# Patient Record
Sex: FEMALE | Race: WHITE | Employment: OTHER | URBAN - METROPOLITAN AREA
[De-identification: names, ages, dates, MRNs, and addresses within clinical notes are randomized per-mention and may not be internally consistent; named-entity substitution may affect disease eponyms.]

---

## 2017-02-01 ENCOUNTER — ANESTHESIA EVENT (OUTPATIENT)
Dept: ENDOSCOPY | Age: 67
End: 2017-02-01
Payer: MEDICARE

## 2017-02-01 ENCOUNTER — ANESTHESIA (OUTPATIENT)
Dept: ENDOSCOPY | Age: 67
End: 2017-02-01
Payer: MEDICARE

## 2017-02-01 ENCOUNTER — HOSPITAL ENCOUNTER (OUTPATIENT)
Age: 67
Setting detail: OUTPATIENT SURGERY
Discharge: HOME | End: 2017-02-01
Attending: INTERNAL MEDICINE | Admitting: INTERNAL MEDICINE
Payer: MEDICARE

## 2017-02-01 VITALS
TEMPERATURE: 97.6 F | HEART RATE: 51 BPM | OXYGEN SATURATION: 100 % | DIASTOLIC BLOOD PRESSURE: 76 MMHG | RESPIRATION RATE: 14 BRPM | SYSTOLIC BLOOD PRESSURE: 148 MMHG

## 2017-02-01 PROCEDURE — 77030027957 HC TBNG IRR ENDOGTR BUSS -B: Performed by: INTERNAL MEDICINE

## 2017-02-01 PROCEDURE — 74011250636 HC RX REV CODE- 250/636

## 2017-02-01 PROCEDURE — 76060000031 HC ANESTHESIA FIRST 0.5 HR: Performed by: INTERNAL MEDICINE

## 2017-02-01 PROCEDURE — 74011000250 HC RX REV CODE- 250

## 2017-02-01 PROCEDURE — 76040000019: Performed by: INTERNAL MEDICINE

## 2017-02-01 RX ORDER — ATROPINE SULFATE 0.1 MG/ML
0.5 INJECTION INTRAVENOUS
Status: DISCONTINUED | OUTPATIENT
Start: 2017-02-01 | End: 2017-02-01 | Stop reason: HOSPADM

## 2017-02-01 RX ORDER — LIDOCAINE HYDROCHLORIDE 20 MG/ML
INJECTION, SOLUTION EPIDURAL; INFILTRATION; INTRACAUDAL; PERINEURAL AS NEEDED
Status: DISCONTINUED | OUTPATIENT
Start: 2017-02-01 | End: 2017-02-01 | Stop reason: HOSPADM

## 2017-02-01 RX ORDER — ZOLEDRONIC ACID 5 MG/100ML
5 INJECTION, SOLUTION INTRAVENOUS ONCE
COMMUNITY

## 2017-02-01 RX ORDER — EPINEPHRINE 0.1 MG/ML
1 INJECTION INTRACARDIAC; INTRAVENOUS
Status: DISCONTINUED | OUTPATIENT
Start: 2017-02-01 | End: 2017-02-01 | Stop reason: HOSPADM

## 2017-02-01 RX ORDER — MIDAZOLAM HYDROCHLORIDE 1 MG/ML
.25-5 INJECTION, SOLUTION INTRAMUSCULAR; INTRAVENOUS
Status: DISCONTINUED | OUTPATIENT
Start: 2017-02-01 | End: 2017-02-01 | Stop reason: HOSPADM

## 2017-02-01 RX ORDER — SODIUM CHLORIDE 0.9 % (FLUSH) 0.9 %
5-10 SYRINGE (ML) INJECTION EVERY 8 HOURS
Status: DISCONTINUED | OUTPATIENT
Start: 2017-02-01 | End: 2017-02-01 | Stop reason: HOSPADM

## 2017-02-01 RX ORDER — VALACYCLOVIR HYDROCHLORIDE 500 MG/1
TABLET, FILM COATED ORAL 2 TIMES DAILY
COMMUNITY

## 2017-02-01 RX ORDER — NALOXONE HYDROCHLORIDE 0.4 MG/ML
0.4 INJECTION, SOLUTION INTRAMUSCULAR; INTRAVENOUS; SUBCUTANEOUS
Status: DISCONTINUED | OUTPATIENT
Start: 2017-02-01 | End: 2017-02-01 | Stop reason: HOSPADM

## 2017-02-01 RX ORDER — SODIUM CHLORIDE 9 MG/ML
50 INJECTION, SOLUTION INTRAVENOUS CONTINUOUS
Status: DISCONTINUED | OUTPATIENT
Start: 2017-02-01 | End: 2017-02-01 | Stop reason: HOSPADM

## 2017-02-01 RX ORDER — PROPOFOL 10 MG/ML
INJECTION, EMULSION INTRAVENOUS AS NEEDED
Status: DISCONTINUED | OUTPATIENT
Start: 2017-02-01 | End: 2017-02-01 | Stop reason: HOSPADM

## 2017-02-01 RX ORDER — FENTANYL CITRATE 50 UG/ML
100 INJECTION, SOLUTION INTRAMUSCULAR; INTRAVENOUS
Status: DISCONTINUED | OUTPATIENT
Start: 2017-02-01 | End: 2017-02-01 | Stop reason: HOSPADM

## 2017-02-01 RX ORDER — SODIUM CHLORIDE 0.9 % (FLUSH) 0.9 %
5-10 SYRINGE (ML) INJECTION AS NEEDED
Status: DISCONTINUED | OUTPATIENT
Start: 2017-02-01 | End: 2017-02-01 | Stop reason: HOSPADM

## 2017-02-01 RX ORDER — DEXTROMETHORPHAN/PSEUDOEPHED 2.5-7.5/.8
1.2 DROPS ORAL
Status: DISCONTINUED | OUTPATIENT
Start: 2017-02-01 | End: 2017-02-01 | Stop reason: HOSPADM

## 2017-02-01 RX ORDER — FLUMAZENIL 0.1 MG/ML
0.2 INJECTION INTRAVENOUS
Status: DISCONTINUED | OUTPATIENT
Start: 2017-02-01 | End: 2017-02-01 | Stop reason: HOSPADM

## 2017-02-01 RX ADMIN — PROPOFOL 20 MG: 10 INJECTION, EMULSION INTRAVENOUS at 09:25

## 2017-02-01 RX ADMIN — PROPOFOL 50 MG: 10 INJECTION, EMULSION INTRAVENOUS at 09:30

## 2017-02-01 RX ADMIN — PROPOFOL 50 MG: 10 INJECTION, EMULSION INTRAVENOUS at 09:41

## 2017-02-01 RX ADMIN — PROPOFOL 50 MG: 10 INJECTION, EMULSION INTRAVENOUS at 09:33

## 2017-02-01 RX ADMIN — PROPOFOL 100 MG: 10 INJECTION, EMULSION INTRAVENOUS at 09:26

## 2017-02-01 RX ADMIN — PROPOFOL 50 MG: 10 INJECTION, EMULSION INTRAVENOUS at 09:38

## 2017-02-01 RX ADMIN — LIDOCAINE HYDROCHLORIDE 20 MG: 20 INJECTION, SOLUTION EPIDURAL; INFILTRATION; INTRACAUDAL; PERINEURAL at 09:25

## 2017-02-01 NOTE — ANESTHESIA PREPROCEDURE EVALUATION
Anesthetic History   No history of anesthetic complications            Review of Systems / Medical History  Patient summary reviewed, nursing notes reviewed and pertinent labs reviewed    Pulmonary  Within defined limits                 Neuro/Psych   Within defined limits           Cardiovascular  Within defined limits                     GI/Hepatic/Renal  Within defined limits         Liver disease     Endo/Other  Within defined limits           Other Findings              Physical Exam    Airway  Mallampati: II  TM Distance: > 6 cm  Neck ROM: normal range of motion   Mouth opening: Normal     Cardiovascular  Regular rate and rhythm,  S1 and S2 normal,  no murmur, click, rub, or gallop             Dental  No notable dental hx       Pulmonary  Breath sounds clear to auscultation               Abdominal  GI exam deferred       Other Findings            Anesthetic Plan    ASA: 3  Anesthesia type: MAC          Induction: Intravenous  Anesthetic plan and risks discussed with: Patient

## 2017-02-01 NOTE — PROCEDURES
[address]  [address] [facility] [address] [facility]               : Juliet Valencia MD    Referring Provider: Dorota Kyle MD    Sedation:  MAC anesthesia Propofol      Prior to the procedure its objectives, risks, consequences and alternatives were discussed with the patient who then elected to proceed. The patient had the opportunity to ask questions and those questions were answered. A physical exam was performed. The heart, lungs, and mental status were examined prior to the procedure and found to be satisfactory for conscious sedation and for the procedure. Conscious sedation was initiated by the physician. Continuous pulse oximetry and blood pressure monitoring were used throughout the procedure. After appropriate pharyngeal anesthesia, the endoscope was passed into the esophagus without difficulty. The proximal esophagus is normal as is the distal esophagus. There are no distal varices, just scarring. The scope was passed into the stomach without difficulty. The fundus, body, antrum, pylorus, bulb and postbulbar area are unremarkable. On slow withdrawal of the scope, no abnormality was noted. Retroflexion revealed normal cardia and fundus. She tolerated the procedure without complications and will be discharged later today. Specimen None    Complications: None. EBL:  None.     Juliet Valencia MD  2/1/2017  9:52 AM

## 2017-02-01 NOTE — ROUTINE PROCESS
Franky Bruner  [birth year]  [id number]    Situation:  Verbal report received from: Max Chauhan RN  Procedure: Procedure(s):  ESOPHAGOGASTRODUODENOSCOPY (EGD)  COLONOSCOPY    Background:    Preoperative diagnosis: SCREENING RECALL, HX ESOPHAEAL VARICES  Postoperative diagnosis: 1. Normal EGD  2.  Normal Colonoscopy    :  Dr. Jennifer Bonner  Assistant(s): Endoscopy Technician-1: Noel Wallace  Endoscopy RN-1: Keven Mayfield RN    Specimens: * No specimens in log *  H. Pylori  no    Assessment:  Intra-procedure medications         Anesthesia gave intra-procedure sedation and medications, see anesthesia flow sheet yes    Intravenous fluids: NS@ KVO     Vital signs stable     Abdominal assessment: round and soft   Recommendation:  Discharge patient per MD order  Family or Friend   Permission to share finding with family or friend yes

## 2017-02-01 NOTE — ANESTHESIA POSTPROCEDURE EVALUATION
Post-Anesthesia Evaluation and Assessment    Patient: Lita Wray MRN: [id number]  SSN: [id number]    YOB: 1950  Age: 74 y.o. Sex: female       Cardiovascular Function/Vital Signs  Visit Vitals    /55    Pulse 68    Resp 15    SpO2 99%       Patient is status post MAC anesthesia for Procedure(s):  ESOPHAGOGASTRODUODENOSCOPY (EGD)  COLONOSCOPY. Nausea/Vomiting: None    Postoperative hydration reviewed and adequate. Pain:  Pain Scale 1: Numeric (0 - 10) (02/01/17 0913)  Pain Intensity 1: 0 (02/01/17 0913)   Managed    Neurological Status: At baseline    Mental Status and Level of Consciousness: Arousable    Pulmonary Status:   O2 Device: Nasal cannula (02/01/17 0944)   Adequate oxygenation and airway patent    Complications related to anesthesia: None    Post-anesthesia assessment completed.  No concerns    Signed By: Kalyani Pelaez MD     February 1, 2017

## 2017-02-01 NOTE — H&P
[address]  [address], [address] [facility] Regis is a  74 y.o.  female who presents with a history of esophageal varices secondary to autoimmune hepatitis. She is also having a screening colonoscopy. .        Past Medical History   Diagnosis Date    [facility] CA ([facility] [address]     Cancer ([facility] [address]      breast cancer    Coagulation defects 2001     TTP    Esophageal varices (HCC)     Liver disease      autoimmune liver disease    Portal hypertension (HCC)      Past Surgical History   Procedure Laterality Date    Hx orthopaedic       knee replacement    Pr breast surgery procedure unlisted       lumpectomy    Hx other surgical       partial thyroidectomy 1999    Hx gyn       tubaligation    Hx gi       banding varicies     Allergies   Allergen Reactions    Aspirin Anaphylaxis    Mushroom Hives         Visit Vitals    Pulse (!) 57    Resp 18    SpO2 97%           PHYSICAL EXAM:  General: WD, WN. Alert, cooperative, no acute distress    HEENT: NC, Atraumatic. PERRLA, EOMI. Anicteric sclerae. Mallampati score 2  Lungs:  CTA Bilaterally. No Wheezing/Rhonchi/Rales. Heart:  Regular  rhythm,  No murmur (), No Rubs, No Gallops  Abdomen: Soft, Non distended, Non tender.  +Bowel sounds, no HSM  Extremities: No c/c/e  Neurologic:  CN 2-12 gi, Alert and oriented X 3. No acute neurological distress   Psych:   Good insight. Not anxious nor agitated. Plan:   Endoscopic procedure with MAC.     Narayan Power MD  2/1/2017  9:24 AM

## 2017-02-01 NOTE — PROCEDURES
[address]  [facility] [address], [address] [facility]              :  Kerri Duran MD    Referring Provider: Cande Roy MD    Sedation:  MAC anesthesia Propofol        Prior to the procedure its objectives, risks, consequences and alternatives were discussed with the patient who then elected to proceed. The patient had the opportunity to ask questions and those questions were answered. A physical exam was performed. The heart, lungs, and mental status were examined prior to the procedure and found to be satisfactory for conscious sedation and for the procedure. Conscious sedation was initiated by the physician. Continuous pulse oximetry and blood pressure monitoring were used throughout the procedure. After appropriate analgesia and rectal exam, the colonoscope was passed into the anus and passed to the cecum without difficulty. The prep was good. On slow withdrawal of the scope, the cecum, ascending colon, hepatic flexure, transverse colon, splenic flexure, descending colon, sigmoid and rectum were normal. Retroflexion exam of the rectum was normal. She tolerated the procedure without complication and I recommend a repeat colonoscopy in 10 years. Specimen none    Complications: None. EBL:  None.     Kerri Duran MD  2/1/2017  9:53 AM

## 2017-02-01 NOTE — ANESTHESIA POSTPROCEDURE EVALUATION
Post-Anesthesia Evaluation and Assessment    Patient: Iman Mora MRN: [id number]  SSN: [id number]    YOB: 1950  Age: 74 y.o. Sex: female       Cardiovascular Function/Vital Signs  Visit Vitals    /76    Pulse (!) 51    Temp 36.4 °C (97.6 °F)    Resp 14    SpO2 100%       Patient is status post MAC anesthesia for Procedure(s):  ESOPHAGOGASTRODUODENOSCOPY (EGD)  COLONOSCOPY. Nausea/Vomiting: None    Postoperative hydration reviewed and adequate. Pain:  Pain Scale 1: Numeric (0 - 10) (02/01/17 1015)  Pain Intensity 1: 0 (02/01/17 1015)   Managed    Neurological Status: At baseline    Mental Status and Level of Consciousness: Arousable    Pulmonary Status:   O2 Device: Room air (02/01/17 1015)   Adequate oxygenation and airway patent    Complications related to anesthesia: None    Post-anesthesia assessment completed.  No concerns    Signed By: Hammad Reece MD     February 1, 2017

## 2017-02-01 NOTE — IP AVS SNAPSHOT
[address] [facility] [address] [facility] 
[phone] Patient: Carlos Ayala MRN: [id number] Rondel Knife You are allergic to the following Allergen Reactions Aspirin Anaphylaxis Mushroom Hives Recent Documentation OB Status Smoking Status Postmenopausal Never Smoker Emergency Contacts Name Discharge Info Relation Home Work Mobile Ramy Vela  Spouse [3] [phone] About your hospitalization You were admitted on:  February 1, 2017 You last received care in the:  [facility] ENDOSCOPY You were discharged on:  February 1, 2017 Unit phone number:  [phone] Why you were hospitalized Your primary diagnosis was:  Not on File Providers Seen During Your Hospitalizations Provider Role Specialty Primary office phone Malgorzata Montalvo MD Attending Provider Gastroenterology [phone] Your Primary Care Physician (PCP) Primary Care Physician Office Phone Office Fax Shorty Reederle [phone] Follow-up Information None Current Discharge Medication List  
  
CONTINUE these medications which have NOT CHANGED Dose & Instructions Dispensing Information Comments Morning Noon Evening Bedtime CALCIUM 600 + D(3) PO Your next dose is: Today, Tomorrow Other:  _________ Take  by mouth. Refills:  0  
     
   
   
   
  
 FISH OIL 1,000 mg Cap Generic drug:  omega-3 fatty acids-vitamin e Your next dose is: Today, Tomorrow Other:  _________ Dose:  1 Cap Take 1 Cap by mouth. Refills:  0 MULTIGEN tablet Generic drug:  Iron [id number]-C-Y85-[id number]-Suc-Stoma Your next dose is: Today, Tomorrow Other:  _________ Dose:  1 Tab Take 1 Tab by mouth daily. Refills:  0 multivitamin tablet Commonly known as:  ONE A DAY Your next dose is: Today, Tomorrow Other:  _________ Dose:  1 Tab Take 1 tablet by mouth daily. Refills:  0  
     
   
   
   
  
 nadolol 20 mg tablet Commonly known as:  CORGARD Your next dose is: Today, Tomorrow Other:  _________ Take  by mouth daily. Refills:  0  
     
   
   
   
  
 predniSONE 1 mg tablet Commonly known as:  Lindsay Roers Your next dose is: Today, Tomorrow Other:  _________ Dose:  5 mg Take 5 mg by mouth daily. Refills:  0 RECLAST 5 mg/100 mL Soln Generic drug:  zoledronic acid Your next dose is: Today, Tomorrow Other:  _________ Dose:  5 mg  
5 mg by IntraVENous route once. Refills:  0  
     
   
   
   
  
 valACYclovir 500 mg tablet Commonly known as:  VALTREX Your next dose is: Today, Tomorrow Other:  _________ Take  by mouth two (2) times a day. Refills:  0 ZyrTEC 10 mg tablet Generic drug:  cetirizine Your next dose is: Today, Tomorrow Other:  _________ Dose:  5 mg Take 5 mg by mouth daily. Refills:  0 Discharge Instructions [address] [place] [address] [id number] 1950 COLON / EGD DISCHARGE INSTRUCTIONS Discomfort: 
Sore throat- warm salt water gargle Redness at IV site- apply warm compress to area; if redness or soreness persist- contact your physician There may be a slight amount of blood passed from the rectum Gaseous discomfort- walking, belching will help relieve any discomfort You may not operate a vehicle for 12 hours You may not engage in an occupation involving machinery or appliances for rest of today You may not drink alcoholic beverages for at least 12 hours Avoid making any critical decisions for at least 24 hour DIET: 
 High fiber diet.  however -  remember your colon is empty and a heavy meal will produce gas. Avoid these foods:  vegetables, fried / greasy foods, carbonated drinks for today ACTIVITY: 
You may  resume your normal daily activities it is recommended that you spend the remainder of the day resting -  avoid any strenuous activity. CALL M.D. ANY SIGN OF: Increasing pain, nausea, vomiting Abdominal distension (swelling) New increased bleeding (oral or rectal) Fever (chills) Bloody discharge from nose or mouth Shortness of breath or chest pain call 911 then call Dr Judith Martinez Follow-up Instructions: 
 Call Dr. Mary Beth Smith for any questions or problems. Telephone # [phone] Should have a repeat colonoscopy in 10 years Impression:  1. Normal EGD 2. Normal Colonoscopy Geraldineorlando Benton [id number] 1950 DISCHARGE SUMMARY from Nurse The following personal items collected during your admission are returned to you:  
Dental Appliance: Dental Appliances: None Vision: Visual Aid: Glasses Hearing Aid:   
Jewelry:   
Clothing:   
Other Valuables:   
Valuables sent to safe:   
 
PATIENT INSTRUCTIONS: 
 
 
 
 
 
Discharge Orders None Introducing [facility]! Marlene Guerra introduces Frazr patient portal. Now you can access parts of your medical record, email your doctor's office, and request medication refills online. 1. In your internet browser, go to [email or web address] 2. Click on the First Time User? Click Here link in the Sign In box. You will see the New Member Sign Up page. 3. Enter your Frazr Access Code exactly as it appears below. You will not need to use this code after youve completed the sign-up process. If you do not sign up before the expiration date, you must request a new code. · Frazr Access Code: [email or web address] Expires: 5/2/2017  7:55 AM 
 
4. Enter the last four digits of your Social Security Number (xxxx) and Date of Birth (mm/dd/yyyy) as indicated and click Submit. You will be taken to the next sign-up page. 5. Create a Reveal ID. This will be your Reveal login ID and cannot be changed, so think of one that is secure and easy to remember. 6. Create a Reveal password. You can change your password at any time. 7. Enter your Password Reset Question and Answer. This can be used at a later time if you forget your password. 8. Enter your e-mail address. You will receive e-mail notification when new information is available in [address] 9. Click Sign Up. You can now view and download portions of your medical record. 10. Click the Download Summary menu link to download a portable copy of your medical information. If you have questions, please visit the Frequently Asked Questions section of the Reveal website. Remember, Reveal is NOT to be used for urgent needs. For medical emergencies, dial 911. Now available from your iPhone and Android! General Information Please provide this summary of care documentation to your next provider. Patient Signature:  ____________________________________________________________ Date:  ____________________________________________________________  
  
[facility] Provider Signature:  ____________________________________________________________ Date:  ____________________________________________________________

## 2017-02-01 NOTE — DISCHARGE INSTRUCTIONS
[address]  [address]  [id number]  1950    COLON / EGD DISCHARGE INSTRUCTIONS  Discomfort:  Sore throat- warm salt water gargle  Redness at IV site- apply warm compress to area; if redness or soreness persist- contact your physician  There may be a slight amount of blood passed from the rectum  Gaseous discomfort- walking, belching will help relieve any discomfort  You may not operate a vehicle for 12 hours  You may not engage in an occupation involving machinery or appliances for rest of today  You may not drink alcoholic beverages for at least 12 hours  Avoid making any critical decisions for at least 24 hour  DIET:   High fiber diet. - however -  remember your colon is empty and a heavy meal will produce gas. Avoid these foods:  vegetables, fried / greasy foods, carbonated drinks for today     ACTIVITY:  You may  resume your normal daily activities it is recommended that you spend the remainder of the day resting -  avoid any strenuous activity. CALL M.D. ANY SIGN OF:   Increasing pain, nausea, vomiting  Abdominal distension (swelling)  New increased bleeding (oral or rectal)  Fever (chills)  Bloody discharge from nose or mouth  Shortness of breath or chest pain call 911 then call Dr Oly Carrillo    Follow-up Instructions:   Call Dr. Greyson Hurtado for any questions or problems. Telephone # [phone]             Should have a repeat colonoscopy in 10 years      Impression:  1. Normal EGD  2.  Normal Colonoscopy        [facility]  [id number]  1950        DISCHARGE SUMMARY from Nurse    The following personal items collected during your admission are returned to you:   Dental Appliance: Dental Appliances: None  Vision: Visual Aid: Glasses  Hearing Aid:    Jewelry:    Clothing:    Other Valuables:    Valuables sent to safe:      PATIENT INSTRUCTIONS:

## 2018-10-25 ENCOUNTER — HOSPITAL ENCOUNTER (OUTPATIENT)
Age: 68
Setting detail: OUTPATIENT SURGERY
Discharge: HOME | End: 2018-10-25
Attending: INTERNAL MEDICINE | Admitting: INTERNAL MEDICINE
Payer: MEDICARE

## 2018-10-25 ENCOUNTER — ANESTHESIA (OUTPATIENT)
Dept: ENDOSCOPY | Age: 68
End: 2018-10-25
Payer: MEDICARE

## 2018-10-25 ENCOUNTER — ANESTHESIA EVENT (OUTPATIENT)
Dept: ENDOSCOPY | Age: 68
End: 2018-10-25
Payer: MEDICARE

## 2018-10-25 VITALS
OXYGEN SATURATION: 93 % | DIASTOLIC BLOOD PRESSURE: 72 MMHG | WEIGHT: 157 LBS | BODY MASS INDEX: 26.16 KG/M2 | HEIGHT: 65 IN | HEART RATE: 56 BPM | RESPIRATION RATE: 18 BRPM | SYSTOLIC BLOOD PRESSURE: 133 MMHG | TEMPERATURE: 97.8 F

## 2018-10-25 PROCEDURE — 74011250636 HC RX REV CODE- 250/636: Performed by: INTERNAL MEDICINE

## 2018-10-25 PROCEDURE — 76040000019: Performed by: INTERNAL MEDICINE

## 2018-10-25 PROCEDURE — 76060000031 HC ANESTHESIA FIRST 0.5 HR: Performed by: INTERNAL MEDICINE

## 2018-10-25 PROCEDURE — 74011250636 HC RX REV CODE- 250/636

## 2018-10-25 RX ORDER — ATROPINE SULFATE 0.1 MG/ML
0.5 INJECTION INTRAVENOUS
Status: DISCONTINUED | OUTPATIENT
Start: 2018-10-25 | End: 2018-10-25 | Stop reason: HOSPADM

## 2018-10-25 RX ORDER — MIDAZOLAM HYDROCHLORIDE 1 MG/ML
.25-5 INJECTION, SOLUTION INTRAMUSCULAR; INTRAVENOUS
Status: DISCONTINUED | OUTPATIENT
Start: 2018-10-25 | End: 2018-10-25 | Stop reason: HOSPADM

## 2018-10-25 RX ORDER — FENTANYL CITRATE 50 UG/ML
100 INJECTION, SOLUTION INTRAMUSCULAR; INTRAVENOUS
Status: DISCONTINUED | OUTPATIENT
Start: 2018-10-25 | End: 2018-10-25 | Stop reason: HOSPADM

## 2018-10-25 RX ORDER — SODIUM CHLORIDE 0.9 % (FLUSH) 0.9 %
5-10 SYRINGE (ML) INJECTION AS NEEDED
Status: DISCONTINUED | OUTPATIENT
Start: 2018-10-25 | End: 2018-10-25 | Stop reason: HOSPADM

## 2018-10-25 RX ORDER — EPINEPHRINE 0.1 MG/ML
1 INJECTION INTRACARDIAC; INTRAVENOUS
Status: DISCONTINUED | OUTPATIENT
Start: 2018-10-25 | End: 2018-10-25 | Stop reason: HOSPADM

## 2018-10-25 RX ORDER — NALOXONE HYDROCHLORIDE 0.4 MG/ML
0.4 INJECTION, SOLUTION INTRAMUSCULAR; INTRAVENOUS; SUBCUTANEOUS
Status: DISCONTINUED | OUTPATIENT
Start: 2018-10-25 | End: 2018-10-25 | Stop reason: HOSPADM

## 2018-10-25 RX ORDER — FLUMAZENIL 0.1 MG/ML
0.2 INJECTION INTRAVENOUS
Status: DISCONTINUED | OUTPATIENT
Start: 2018-10-25 | End: 2018-10-25 | Stop reason: HOSPADM

## 2018-10-25 RX ORDER — PROPOFOL 10 MG/ML
INJECTION, EMULSION INTRAVENOUS AS NEEDED
Status: DISCONTINUED | OUTPATIENT
Start: 2018-10-25 | End: 2018-10-25 | Stop reason: HOSPADM

## 2018-10-25 RX ORDER — DEXTROMETHORPHAN/PSEUDOEPHED 2.5-7.5/.8
1.2 DROPS ORAL
Status: DISCONTINUED | OUTPATIENT
Start: 2018-10-25 | End: 2018-10-25 | Stop reason: HOSPADM

## 2018-10-25 RX ORDER — SODIUM CHLORIDE 9 MG/ML
50 INJECTION, SOLUTION INTRAVENOUS CONTINUOUS
Status: DISCONTINUED | OUTPATIENT
Start: 2018-10-25 | End: 2018-10-25 | Stop reason: HOSPADM

## 2018-10-25 RX ORDER — SODIUM CHLORIDE 0.9 % (FLUSH) 0.9 %
5-10 SYRINGE (ML) INJECTION EVERY 8 HOURS
Status: DISCONTINUED | OUTPATIENT
Start: 2018-10-25 | End: 2018-10-25 | Stop reason: HOSPADM

## 2018-10-25 RX ORDER — LIDOCAINE HYDROCHLORIDE 20 MG/ML
INJECTION, SOLUTION INFILTRATION; PERINEURAL AS NEEDED
Status: DISCONTINUED | OUTPATIENT
Start: 2018-10-25 | End: 2018-10-25 | Stop reason: HOSPADM

## 2018-10-25 RX ADMIN — PROPOFOL 80 MG: 10 INJECTION, EMULSION INTRAVENOUS at 14:44

## 2018-10-25 RX ADMIN — LIDOCAINE HYDROCHLORIDE 40 MG: 20 INJECTION, SOLUTION INFILTRATION; PERINEURAL at 14:44

## 2018-10-25 RX ADMIN — PROPOFOL 40 MG: 10 INJECTION, EMULSION INTRAVENOUS at 14:45

## 2018-10-25 RX ADMIN — SODIUM CHLORIDE: 900 INJECTION, SOLUTION INTRAVENOUS at 14:37

## 2018-10-25 NOTE — ANESTHESIA PREPROCEDURE EVALUATION
Anesthetic History No history of anesthetic complications Review of Systems / Medical History Patient summary reviewed, nursing notes reviewed and pertinent labs reviewed Pulmonary Within defined limits Neuro/Psych Within defined limits Cardiovascular Within defined limits GI/Hepatic/Renal 
Within defined limits Liver disease Comments: Portal hypertension Autoimmune hepatitis Endo/Other Within defined limits Other Findings Physical Exam 
 
Airway Mallampati: II 
TM Distance: > 6 cm Neck ROM: normal range of motion Mouth opening: Normal 
 
 Cardiovascular Regular rate and rhythm,  S1 and S2 normal,  no murmur, click, rub, or gallop Dental 
No notable dental hx Pulmonary Breath sounds clear to auscultation Abdominal 
GI exam deferred Other Findings Anesthetic Plan ASA: 3 Anesthesia type: MAC Induction: Intravenous Anesthetic plan and risks discussed with: Patient

## 2018-10-25 NOTE — ANESTHESIA POSTPROCEDURE EVALUATION
Post-Anesthesia Evaluation and Assessment Patient: Romario  MRN: [id number]  SSN: [id number] YOB: 1950  Age: 77 y.o. Sex: female I have evaluated the patient and they are stable and ready for discharge from the PACU. Cardiovascular Function/Vital Signs Visit Vitals BP 98/54 Pulse (!) 58 Temp 36.6 °C (97.8 °F) Resp 15 Ht 5' 5\" (1.651 m) Wt 71.2 kg (157 lb) SpO2 98% BMI 26.13 kg/m² Patient is status post MAC anesthesia for Procedure(s): ESOPHAGOGASTRODUODENOSCOPY (EGD). Nausea/Vomiting: None Postoperative hydration reviewed and adequate. Pain: 
Pain Scale 1: Numeric (0 - 10) (10/25/18 1459) Pain Intensity 1: 0 (10/25/18 1459) Managed Neurological Status: At baseline Mental Status, Level of Consciousness: Alert and  oriented to person, place, and time Pulmonary Status:  
O2 Device: Room air (10/25/18 1459) Adequate oxygenation and airway patent Complications related to anesthesia: None Post-anesthesia assessment completed. No concerns Signed By: Bryn Proctor MD   
 October 25, 2018 Procedure(s): ESOPHAGOGASTRODUODENOSCOPY (EGD). <BSHSIANPOST> Visit Vitals BP 98/54 Pulse (!) 58 Temp 36.6 °C (97.8 °F) Resp 15 Ht 5' 5\" (1.651 m) Wt 71.2 kg (157 lb) SpO2 98% BMI 26.13 kg/m²

## 2018-10-25 NOTE — PROCEDURES
[address]  [place], [address]               : Trent Martinez MD    Referring Provider: Iona Ferrari MD    Sedation:  MAC anesthesia Propofol      Prior to the procedure its objectives, risks, consequences and alternatives were discussed with the patient who then elected to proceed. The patient had the opportunity to ask questions and those questions were answered. A physical exam was performed. The heart, lungs, and mental status were examined prior to the procedure and found to be satisfactory for conscious sedation and for the procedure. Conscious sedation was initiated by the physician. Continuous pulse oximetry and blood pressure monitoring were used throughout the procedure. After appropriate pharyngeal anesthesia, the endoscope was passed into the esophagus without difficulty. The proximal esophagus has some small varices, but there are distal varices at this tme, therefore no banding necessary. The scope was passed into the stomach without difficulty. The fundus, body, antrum, pylorus, bulb and postbulbar area are unremarkable. On slow withdrawal of the scope, no abnormality was noted. Retroflexion revealed normal cardia and fundus. She tolerated the procedure without complications and will be discharged later today. Specimen None    Complications: None. EBL:  None.     Trent Martinez MD  10/25/2018  2:51 PM

## 2018-10-25 NOTE — DISCHARGE INSTRUCTIONS
[address]  976 [facility]           April Bautista  [id number]  1950    EGD DISCHARGE INSTRUCTIONS    Discomfort:  Sore throat- throat lozenges or warm salt water gargle  redness at IV site- apply warm compress to area; if redness or soreness persist- contact your physician  Gaseous discomfort- walking, belching will help relieve any discomfort  You may not operate a vehicle for 12 hours  You may not engage in an occupation involving machinery or appliances for rest of today  You may not drink alcoholic beverages for at least 12 hours  Avoid making any critical decisions for at least 24 hour  DIET  You may have anything by mouth- do not eat or drink for two hours. You may eat and drink after you leave. You may resume your regular diet - however -  remember your colon is empty and a heavy meal will produce gas. Avoid these foods:  vegetables, fried / greasy foods, carbonated drinks        ACTIVITY  You may resume your normal daily activities   Spend the remainder of the day resting -  avoid any strenuous activity. CALL M.D. ANY SIGN OF   Increasing pain, nausea, vomiting  Abdominal distension (swelling)  New increased bleeding (oral or rectal)  Fever (chills)  Pain in chest area  Bloody discharge from nose or mouth  Shortness of breath    Follow-up Instructions:   Call Ginger Sahu for any questions or problems. Telephone # [phone]   Continue same medications. Impression:  1. Esophageal Varices, proximal only    Jae Barrett MD  10/25/2018  2:54 PM  [employer] Activation    Thank you for requesting access to [employer]. Please follow the instructions below to securely access and download your online medical record. [employer] allows you to send messages to your doctor, view your test results, renew your prescriptions, schedule appointments, and more. How Do I Sign Up? 1. In your internet browser, go to [email or web address]  2.  Click on the First Time User? Click Here link in the Sign In box. You will be redirect to the New Member Sign Up page. 3. Enter your [facility] Access Code exactly as it appears below. You will not need to use this code after youve completed the sign-up process. If you do not sign up before the expiration date, you must request a new code. [facility] Access Code: [email or web address]  Expires: 2019  3:01 PM (This is the date your [facility] access code will )    4. Enter the last four digits of your Social Security Number (xxxx) and Date of Birth (mm/dd/yyyy) as indicated and click Submit. You will be taken to the next sign-up page. 5. Create a [facility] ID. This will be your [facility] login ID and cannot be changed, so think of one that is secure and easy to remember. 6. Create a [facility] password. You can change your password at any time. 7. Enter your Password Reset Question and Answer. This can be used at a later time if you forget your password. 8. Enter your e-mail address. You will receive e-mail notification when new information is available in [address] 9. Click Sign Up. You can now view and download portions of your medical record. 10. Click the Download Summary menu link to download a portable copy of your medical information. Additional Information    If you have questions, please visit the Frequently Asked Questions section of the [facility] website at [email or web address] Remember, [facility] is NOT to be used for urgent needs. For medical emergencies, dial 911. Esophageal Varices: Care Instructions  Your Care Instructions    Esophageal varices (say \"ee-sof-uh-MANDA-ul TOJJ-ha-voau\") are veins in your esophagus that are bigger than normal. Your esophagus is a tube. It carries food from your throat to your stomach. These veins get big because of extra pressure. This pressure makes the walls of the veins weak. Then they can rupture and cause very serious bleeding.   This problem is usually found in people who have serious liver disease. Treatments include medicines and procedures to help lower the pressure in the veins. Talk with your doctor about the best treatment for you. If you have bleeding from this problem, there is a risk that it will happen again. In this case, it's important to go back and follow up with your doctor. Follow-up care is a key part of your treatment and safety. Be sure to make and go to all appointments, and call your doctor if you are having problems. It's also a good idea to know your test results and keep a list of the medicines you take. How can you care for yourself at home? · Be safe with medicines. Take your medicines exactly as prescribed. Call your doctor if you think you are having a problem with your medicine. You will get more details on the specific medicines your doctor prescribes. · Talk to your doctor before you take any other medicines. These include over-the-counter medicines, vitamins, and herbal products. · Avoid aspirin, ibuprofen (Advil, Motrin), and naproxen (Aleve). These can cause sores in your stomach or esophagus. · Do not drink alcohol. It increases your risk of bleeding. It can also make liver damage worse. Tell your doctor if you need help to quit. Counseling, support groups, and sometimes medicines can help you stay sober. When should you call for help? Call 911 anytime you think you may need emergency care. For example, call if:    · You have trouble breathing.     · You vomit blood or what looks like coffee grounds.    Call your doctor now or seek immediate medical care if:    · You feel very sleepy or confused.     · You have new or worse belly pain.     · You have a fever.     · There is a new or increasing yellow tint to your skin or the whites of your eyes.     · You have any abnormal bleeding, such as:  ? Nosebleeds.   ? Vaginal bleeding that is different (heavier, more frequent, at a different time of the month) than what you are used to.  ? Bloody or black stools, or rectal bleeding. ? Bloody or pink urine.    Watch closely for changes in your health, and be sure to contact your doctor if:    · You have any problems.     · Your belly is getting bigger.     · You are gaining weight. Where can you learn more? Go to http://marivel-cherelle.info/. Enter M639 in the search box to learn more about \"Esophageal Varices: Care Instructions. \"  Current as of: March 28, 2018  Content Version: 11.8  © 8461-1332 [employer]. Care instructions adapted under license by [employer] (which disclaims liability or warranty for this information). If you have questions about a medical condition or this instruction, always ask your healthcare professional. [address] any warranty or liability for your use of this information.

## 2018-10-25 NOTE — H&P
[address] 
611 [facility], 9 [facility] Johanne Erickson is a  77 y.o.  female who presents with history of auto immune hepatitis. EGD being done to assess status of varices. .   
 
 
Past Medical History:  
Diagnosis Date  Breast CA ([facility] [address]  Cancer ([facility] [address] breast cancer  Coagulation defects 2001 TTP  Esophageal varices ([facility] [address]  Liver disease   
 autoimmune liver disease  Portal hypertension ([facility] [address] Past Surgical History:  
Procedure Laterality Date  BREAST SURGERY PROCEDURE UNLISTED    
 lumpectomy  HX GI    
 banding varicies  HX GYN    
 tubaligation  HX ORTHOPAEDIC    
 knee replacement  HX OTHER SURGICAL    
 partial thyroidectomy 1999 Allergies Allergen Reactions  Aspirin Anaphylaxis  Mushroom Hives Current Facility-Administered Medications Medication Dose Route Frequency Provider Last Rate Last Dose  
 0.9% sodium chloride infusion  50 mL/hr IntraVENous CONTINUOUS Patricia Luna MD      
 sodium chloride (NS) flush 5-10 mL  5-10 mL IntraVENous Q8H Patricia Luna MD      
 sodium chloride (NS) flush 5-10 mL  5-10 mL IntraVENous PRN Patricia Luna MD      
 midazolam (VERSED) injection 0.25-5 mg  0.25-5 mg IntraVENous Multiple Patricia Luna MD      
 fentaNYL citrate (PF) injection 100 mcg  100 mcg IntraVENous Frieda Luna MD      
 naloxone [facility]) injection 0.4 mg  0.4 mg IntraVENous Frieda Luna MD      
 flumazenil (ROMAZICON) 0.1 mg/mL injection 0.2 mg  0.2 mg IntraVENous Frieda Luna MD      
 [facility]) 78KJ/9.7QX oral drops 80 mg  1.2 mL Oral Frieda Luna MD      
 atropine injection 0.5 mg  0.5 mg IntraVENous ONCE PRN Patricia Luna MD      
 EPINEPHrine (ADRENALIN) 0.1 mg/mL syringe 1 mg  1 mg Endoscopically ONCE PRN Patricia Luna MD      
 
 
Visit Vitals /69 Pulse (!) 53 Temp 98.2 °F (36.8 °C) Resp 18 Ht 5' 5\" (1.651 m) Wt 71.2 kg (157 lb) SpO2 100% BMI 26.13 kg/m² PHYSICAL EXAM: 
General: WD, WN. Alert, cooperative, no acute distress   
HEENT: NC, Atraumatic. PERRLA, EOMI. Anicteric sclerae. Mallampati score 2 Lungs:  CTA Bilaterally. No Wheezing/Rhonchi/Rales. Heart:  Regular  rhythm,  No murmur (), No Rubs, No Gallops Abdomen: Soft, Non distended, Non tender.  +Bowel sounds, no HSM Extremities: No c/c/e Neurologic:  CN 2-12 gi, Alert and oriented X 3. No acute neurological distress Psych:   Good insight. Not anxious nor agitated. Plan:  
Endoscopic procedure with MAC.  
 
Nolberto Rich MD 
10/25/2018  2:45 PM

## 2018-10-25 NOTE — ROUTINE PROCESS
[facility] 1950 
[id number] Situation: 
Verbal report received from: Cindy Lisa Procedure: Procedure(s): ESOPHAGOGASTRODUODENOSCOPY (EGD) Background: 
 
Preoperative diagnosis: ESOPHAGEAL BLEEDING Postoperative diagnosis: 1. Esophageal Varices :  Dr. Trice Sales Assistant(s): Endoscopy Technician-1: Jerica Amin Endoscopy RN-1: Rosary Kayser, RN Specimens: * No specimens in log * H. Pylori  no Assessment: 
Intra-procedure medications Anesthesia gave intra-procedure sedation and medications, see anesthesia flow sheet yes Intravenous fluids: NS@ [facility] Vital signs stable Abdominal assessment: round and soft Recommendation: 
Discharge patient per MD order. Family or Friend Permission to share finding with family or friend yes

## 2018-10-25 NOTE — PROGRESS NOTES

## 2019-10-24 ENCOUNTER — ANESTHESIA (OUTPATIENT)
Dept: ENDOSCOPY | Age: 69
End: 2019-10-24
Payer: MEDICARE

## 2019-10-24 ENCOUNTER — ANESTHESIA EVENT (OUTPATIENT)
Dept: ENDOSCOPY | Age: 69
End: 2019-10-24
Payer: MEDICARE

## 2019-10-24 ENCOUNTER — HOSPITAL ENCOUNTER (OUTPATIENT)
Age: 69
Setting detail: OUTPATIENT SURGERY
Discharge: HOME | End: 2019-10-24
Attending: INTERNAL MEDICINE | Admitting: INTERNAL MEDICINE
Payer: MEDICARE

## 2019-10-24 VITALS
DIASTOLIC BLOOD PRESSURE: 71 MMHG | TEMPERATURE: 97.6 F | BODY MASS INDEX: 27.12 KG/M2 | RESPIRATION RATE: 18 BRPM | SYSTOLIC BLOOD PRESSURE: 113 MMHG | HEART RATE: 46 BPM | OXYGEN SATURATION: 100 % | WEIGHT: 163 LBS

## 2019-10-24 PROCEDURE — 76060000031 HC ANESTHESIA FIRST 0.5 HR: Performed by: INTERNAL MEDICINE

## 2019-10-24 PROCEDURE — 76040000019: Performed by: INTERNAL MEDICINE

## 2019-10-24 PROCEDURE — 74011000250 HC RX REV CODE- 250: Performed by: NURSE ANESTHETIST, CERTIFIED REGISTERED

## 2019-10-24 PROCEDURE — 74011250636 HC RX REV CODE- 250/636: Performed by: NURSE ANESTHETIST, CERTIFIED REGISTERED

## 2019-10-24 RX ORDER — PROPOFOL 10 MG/ML
INJECTION, EMULSION INTRAVENOUS AS NEEDED
Status: DISCONTINUED | OUTPATIENT
Start: 2019-10-24 | End: 2019-10-24 | Stop reason: HOSPADM

## 2019-10-24 RX ORDER — NALOXONE HYDROCHLORIDE 0.4 MG/ML
0.4 INJECTION, SOLUTION INTRAMUSCULAR; INTRAVENOUS; SUBCUTANEOUS
Status: DISCONTINUED | OUTPATIENT
Start: 2019-10-24 | End: 2019-10-24 | Stop reason: HOSPADM

## 2019-10-24 RX ORDER — SODIUM CHLORIDE 0.9 % (FLUSH) 0.9 %
5-40 SYRINGE (ML) INJECTION AS NEEDED
Status: DISCONTINUED | OUTPATIENT
Start: 2019-10-24 | End: 2019-10-24 | Stop reason: HOSPADM

## 2019-10-24 RX ORDER — ATROPINE SULFATE 0.1 MG/ML
0.5 INJECTION INTRAVENOUS
Status: DISCONTINUED | OUTPATIENT
Start: 2019-10-24 | End: 2019-10-24 | Stop reason: HOSPADM

## 2019-10-24 RX ORDER — DEXTROMETHORPHAN/PSEUDOEPHED 2.5-7.5/.8
1.2 DROPS ORAL
Status: DISCONTINUED | OUTPATIENT
Start: 2019-10-24 | End: 2019-10-24 | Stop reason: HOSPADM

## 2019-10-24 RX ORDER — LIDOCAINE HYDROCHLORIDE 20 MG/ML
INJECTION, SOLUTION EPIDURAL; INFILTRATION; INTRACAUDAL; PERINEURAL AS NEEDED
Status: DISCONTINUED | OUTPATIENT
Start: 2019-10-24 | End: 2019-10-24 | Stop reason: HOSPADM

## 2019-10-24 RX ORDER — EPINEPHRINE 0.1 MG/ML
1 INJECTION INTRACARDIAC; INTRAVENOUS
Status: DISCONTINUED | OUTPATIENT
Start: 2019-10-24 | End: 2019-10-24 | Stop reason: HOSPADM

## 2019-10-24 RX ORDER — SODIUM CHLORIDE 9 MG/ML
50 INJECTION, SOLUTION INTRAVENOUS CONTINUOUS
Status: DISCONTINUED | OUTPATIENT
Start: 2019-10-24 | End: 2019-10-24 | Stop reason: HOSPADM

## 2019-10-24 RX ORDER — FLUMAZENIL 0.1 MG/ML
0.2 INJECTION INTRAVENOUS
Status: DISCONTINUED | OUTPATIENT
Start: 2019-10-24 | End: 2019-10-24 | Stop reason: HOSPADM

## 2019-10-24 RX ORDER — SODIUM CHLORIDE 0.9 % (FLUSH) 0.9 %
5-40 SYRINGE (ML) INJECTION EVERY 8 HOURS
Status: DISCONTINUED | OUTPATIENT
Start: 2019-10-24 | End: 2019-10-24 | Stop reason: HOSPADM

## 2019-10-24 RX ADMIN — LIDOCAINE HYDROCHLORIDE 100 MG: 20 INJECTION, SOLUTION EPIDURAL; INFILTRATION; INTRACAUDAL; PERINEURAL at 13:11

## 2019-10-24 RX ADMIN — PROPOFOL 150 MG: 10 INJECTION, EMULSION INTRAVENOUS at 13:11

## 2019-10-24 NOTE — ROUTINE PROCESS
Sharonhomer Ermelinda 1950 
[id number] Situation: 
Verbal report received from: Prisca Vincent Procedure: Procedure(s): ESOPHAGOGASTRODUODENOSCOPY (EGD) Background: 
 
Preoperative diagnosis: HX OF VARICES Postoperative diagnosis: 1. - Esophageal Varices :  Dr. Annalise Chaidez Assistant(s): Endoscopy Technician-1: Marisela Estrella Endoscopy RN-1: Josh Le RN Specimens: * No specimens in log * H. Pylori  no Assessment: 
Intra-procedure medications Anesthesia gave intra-procedure sedation and medications, see anesthesia flow sheet yes Intravenous fluids: NS@ [facility] Vital signs stable Abdominal assessment: round and soft Recommendation: 
Discharge patient per MD order. Return to floor Family or Friend Permission to share finding with family or friend yes

## 2019-10-24 NOTE — ANESTHESIA POSTPROCEDURE EVALUATION
Post-Anesthesia Evaluation and Assessment    Patient: Wesley Reyes MRN: [id number]  SSN: [id number]    YOB: 1950  Age: 68 y.o. Sex: female      I have evaluated the patient and they are stable and ready for discharge from the PACU. Cardiovascular Function/Vital Signs  Visit Vitals  BP 93/61   Pulse (!) 58   Temp 36.6 °C (97.9 °F)   Resp 16   Wt 73.9 kg (163 lb)   SpO2 94%   Breastfeeding? No   BMI 27.12 kg/m²       Patient is status post MAC anesthesia for Procedure(s):  ESOPHAGOGASTRODUODENOSCOPY (EGD). Nausea/Vomiting: None    Postoperative hydration reviewed and adequate. Pain:  Pain Scale 1: Numeric (0 - 10) (10/24/19 1327)  Pain Intensity 1: 0 (10/24/19 1327)   Managed    Neurological Status: At baseline    Mental Status, Level of Consciousness: Alert and  oriented to person, place, and time    Pulmonary Status:   O2 Device: Room air (10/24/19 1327)   Adequate oxygenation and airway patent    Complications related to anesthesia: None    Post-anesthesia assessment completed. No concerns    Signed By: Lana Jacinto MD     October 24, 2019              Procedure(s):  ESOPHAGOGASTRODUODENOSCOPY (EGD). MAC    <BSHSIANPOST>    Vitals Value Taken Time   BP 93/61 10/24/2019  1:29 PM   Temp     Pulse 61 10/24/2019  1:29 PM   Resp 16 10/24/2019  1:29 PM   SpO2 92 % 10/24/2019  1:29 PM   Vitals shown include unvalidated device data.

## 2019-10-24 NOTE — H&P
[address] 62  174 [facility], [address] [place] [address]                 Judge Vance is a  68 y.o.  female who presents with history of auto immune liver disease, with varices for endoscopy for possible banding .         Past Medical History:   Diagnosis Date    Breast [facility] ([facility] [address]     Cancer ([facility] [address]     breast cancer    Coagulation defects 2001    TTP    Esophageal varices (H[facility])     Liver disease     autoimmune liver disease    Portal hypertension ([facility] [address]      Past Surgical History:   Procedure Laterality Date    BREAST SURGERY PROCEDURE UNLISTED      lumpectomy    COLONOSCOPY N/A 2/1/2017    COLONOSCOPY performed by Amanda Martini MD at [address] [place] [address] ENDOSCOPY    HX GI      banding varicies    HX GYN      tubaligation    HX ORTHOPAEDIC      knee replacement    HX OTHER SURGICAL      partial thyroidectomy 1999     Allergies   Allergen Reactions    Aspirin Anaphylaxis    Mushroom Hives     Current Facility-Administered Medications   Medication Dose Route Frequency Provider Last Rate Last Dose    0.9% sodium chloride infusion  50 mL/hr IntraVENous CONTINUOUS Angeles Crespo MD        sodium chloride (NS) flush 5-40 mL  5-40 mL IntraVENous Q8H Angeles Crespo MD        sodium chloride (NS) flush 5-40 mL  5-40 mL IntraVENous PRN Angeles Crespo MD        naloxone [facility]) injection 0.4 mg  0.4 mg IntraVENous Multiple Angeles Crespo MD        flumazenil (ROMAZICON) 0.1 mg/mL injection 0.2 mg  0.2 mg IntraVENous Frieda Crespo MD        simethicone (MYLICON) 31QA/7.2GT oral drops 80 mg  1.2 mL Oral Frieda Crespo MD        atropine injection 0.5 mg  0.5 mg IntraVENous ONCE PRN Angeles Crespo MD        EPINEPHrine (ADRENALIN) 0.1 mg/mL syringe 1 mg  1 mg Endoscopically ONCE PRN Angeles Crespo MD         Facility-Administered Medications Ordered in Other Encounters   Medication Dose Route Frequency Provider Last Rate Last Dose    lidocaine (PF) (XYLOCAINE) 20 mg/mL (2 %) injection   IntraVENous PRN Luz Primmer, CRNA   100 mg at 10/24/19 1311    propofol (DIPRIVAN) 10 mg/mL injection   IntraVENous PRN Luz Primmer, CRNA   150 mg at 10/24/19 1311       Visit Vitals  /68   Pulse (!) 48   Temp 97.9 °F (36.6 °C)   Resp 20   Wt 73.9 kg (163 lb)   SpO2 99%   Breastfeeding? No   BMI 27.12 kg/m²           PHYSICAL EXAM:  General: WD, WN. Alert, cooperative, no acute distress    HEENT: NC, Atraumatic. PERRLA, EOMI. Anicteric sclerae. Mallampati score 2  Lungs:  CTA Bilaterally. No Wheezing/Rhonchi/Rales. Heart:  Regular  rhythm,  No murmur (), No Rubs, No Gallops  Abdomen: Soft, Non distended, Non tender.  +Bowel sounds, no HSM  Extremities: No c/c/e  Neurologic:  CN 2-12 gi, Alert and oriented X 3. No acute neurological distress   Psych:   Good insight. Not anxious nor agitated. Plan:   Endoscopic procedure with MAC.     Kerri Duran MD  10/24/2019  1:12 PM

## 2019-10-24 NOTE — PROCEDURES
[address] 62  [address]               : Viki Vanegas MD    Surgical Assistant: None    Implants: None    Referring Provider: Rachael Marquez MD    Sedation:  MAC anesthesia Propofol      Prior to the procedure its objectives, risks, consequences and alternatives were discussed with the patient who then elected to proceed. The patient had the opportunity to ask questions and those questions were answered. A physical exam was performed. The heart, lungs, and mental status were examined prior to the procedure and found to be satisfactory for conscious sedation and for the procedure. Conscious sedation was initiated by the physician. Continuous pulse oximetry and blood pressure monitoring were used throughout the procedure. After appropriate pharyngeal anesthesia, the endoscope was passed into the esophagus without difficulty. There are proximal varices, but distal varices are scared down from banding. The scope was passed into the stomach without difficulty. The fundus, body, antrum, pylorus, bulb and postbulbar area are unremarkable. On slow withdrawal of the scope, no abnormality was noted. Retroflexion revealed normal cardia and fundus. She tolerated the procedure without complications and will be discharged later today. Specimen None    Complications: None. EBL:  None.     Viki Vanegas MD  10/24/2019  1:20 PM

## 2019-10-24 NOTE — DISCHARGE INSTRUCTIONS
[address]  976 [facility]           Linda Woodruff  [id number]  1950    EGD DISCHARGE INSTRUCTIONS    Discomfort:  Sore throat- throat lozenges or warm salt water gargle  redness at IV site- apply warm compress to area; if redness or soreness persist- contact your physician  Gaseous discomfort- walking, belching will help relieve any discomfort  You may not operate a vehicle for 12 hours  You may not engage in an occupation involving machinery or appliances for rest of today  You may not drink alcoholic beverages for at least 12 hours  Avoid making any critical decisions for at least 24 hour  DIET  You may have anything by mouth- do not eat or drink for two hours. You may eat and drink after you leave. You may resume your regular diet - however -  remember your colon is empty and a heavy meal will produce gas. Avoid these foods:  vegetables, fried / greasy foods, carbonated drinks        ACTIVITY  You may resume your normal daily activities   Spend the remainder of the day resting -  avoid any strenuous activity. CALL M.D. ANY SIGN OF   Increasing pain, nausea, vomiting  Abdominal distension (swelling)  New increased bleeding (oral or rectal)  Fever (chills)  Pain in chest area  Bloody discharge from nose or mouth  Shortness of breath    Follow-up Instructions:   Call Daniel Tomlin for any questions or problems. Telephone # [phone]   Continue same medications.     Impression:  1. - Esophageal Varices    Bashir Lewis MD  10/24/2019  1:24 PM

## 2019-10-24 NOTE — PROGRESS NOTES

## 2020-09-28 ENCOUNTER — HOSPITAL ENCOUNTER (EMERGENCY)
Age: 70
Discharge: HOME | End: 2020-09-28
Attending: EMERGENCY MEDICINE
Payer: MEDICARE

## 2020-09-28 VITALS
DIASTOLIC BLOOD PRESSURE: 57 MMHG | RESPIRATION RATE: 17 BRPM | HEART RATE: 52 BPM | BODY MASS INDEX: 27.16 KG/M2 | OXYGEN SATURATION: 97 % | WEIGHT: 163 LBS | HEIGHT: 65 IN | TEMPERATURE: 97.7 F | SYSTOLIC BLOOD PRESSURE: 139 MMHG

## 2020-09-28 DIAGNOSIS — S01.511A LIP LACERATION, INITIAL ENCOUNTER: Primary | ICD-10-CM

## 2020-09-28 PROCEDURE — 74011000250 HC RX REV CODE- 250: Performed by: EMERGENCY MEDICINE

## 2020-09-28 PROCEDURE — 74011250636 HC RX REV CODE- 250/636: Performed by: EMERGENCY MEDICINE

## 2020-09-28 PROCEDURE — 99283 EMERGENCY DEPT VISIT LOW MDM: CPT

## 2020-09-28 PROCEDURE — 75810000293 HC SIMP/SUPERF WND  RPR

## 2020-09-28 PROCEDURE — 90471 IMMUNIZATION ADMIN: CPT

## 2020-09-28 PROCEDURE — 90715 TDAP VACCINE 7 YRS/> IM: CPT | Performed by: EMERGENCY MEDICINE

## 2020-09-28 RX ORDER — LIDOCAINE HYDROCHLORIDE AND EPINEPHRINE 10; 10 MG/ML; UG/ML
INJECTION, SOLUTION INFILTRATION; PERINEURAL
Status: DISPENSED
Start: 2020-09-28 | End: 2020-09-29

## 2020-09-28 RX ORDER — LIDOCAINE HYDROCHLORIDE AND EPINEPHRINE 10; 10 MG/ML; UG/ML
1.5 INJECTION, SOLUTION INFILTRATION; PERINEURAL ONCE
Status: COMPLETED | OUTPATIENT
Start: 2020-09-28 | End: 2020-09-28

## 2020-09-28 RX ADMIN — TETANUS TOXOID, REDUCED DIPHTHERIA TOXOID AND ACELLULAR PERTUSSIS VACCINE, ADSORBED 0.5 ML: 5; 2.5; 8; 8; 2.5 SUSPENSION INTRAMUSCULAR at 22:42

## 2020-09-28 RX ADMIN — LIDOCAINE HYDROCHLORIDE,EPINEPHRINE BITARTRATE 15 MG: 10; .01 INJECTION, SOLUTION INFILTRATION; PERINEURAL at 22:41

## 2020-09-29 NOTE — ED TRIAGE NOTES
Patient arrives with c/o upper lip laceration after falling in the driveway around 6:43MC. Patient states she was evaluated at [facility] and was told to come to ED for further evaluation.

## 2020-09-29 NOTE — ED PROVIDER NOTES
68-year-old female presents from home after she tripped and fell striking her right upper lip on the driveway. She denies any loss of consciousness or other injuries. [facility] and told to come to the emergency department for further evaluation and management. The history is provided by the patient. Laceration    The incident occurred less than 1 hour ago. The laceration is located on the face. The laceration is 1 cm in size. The injury mechanism is a blunt object. Foreign body present: no. The patient is experiencing no pain. Pertinent negatives include no weakness. It is unknown when the patient last had a tetanus shot. 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    Esophageal varices (HCC)     Liver disease     autoimmune liver disease    Portal hypertension (HCC)        Past Surgical History:   Procedure Laterality Date    BREAST SURGERY PROCEDURE UNLISTED      lumpectomy    COLONOSCOPY N/A 2/1/2017    COLONOSCOPY performed by Kelly Tejeda MD at [facility] ENDOSCOPY    HX GI      banding varicies    HX GYN      tubaligation    HX ORTHOPAEDIC      knee replacement    HX OTHER SURGICAL      partial thyroidectomy 1999         Family History:   Problem Relation Age of Onset    Cancer Mother        Social History     Socioeconomic History    Marital status:      Spouse name: Not on file    Number of children: Not on file    Years of education: Not on file    Highest education level: Not on file   Occupational History    Not on file   Social Needs    Financial resource strain: Not on file    Food insecurity     Worry: Not on file     Inability: Not on file    Transportation needs     Medical: Not on file     Non-medical: Not on file   Tobacco Use    Smoking status: Never Smoker    Smokeless tobacco: Never Used   Substance and Sexual Activity    Alcohol use:  No  Drug use: Never    Sexual activity: Not on file   Lifestyle    Physical activity     Days per week: Not on file     Minutes per session: Not on file    Stress: Not on file   Relationships    Social connections     Talks on phone: Not on file     Gets together: Not on file     Attends Faith service: Not on file     Active member of club or organization: Not on file     Attends meetings of clubs or organizations: Not on file     Relationship status: Not on file    Intimate partner violence     Fear of current or ex partner: Not on file     Emotionally abused: Not on file     Physically abused: Not on file     Forced sexual activity: Not on file   Other Topics Concern    Not on file   Social History Narrative    Not on file         ALLERGIES: Aspirin and Mushroom    Review of Systems   Constitutional: Negative for fatigue and fever. HENT: Negative for sneezing and sore throat. Respiratory: Negative for cough and shortness of breath. Cardiovascular: Negative for chest pain and leg swelling. Gastrointestinal: Negative for abdominal pain, diarrhea, nausea and vomiting. Genitourinary: Negative for difficulty urinating and dysuria. Musculoskeletal: Negative for arthralgias and myalgias. Skin: Positive for wound. Negative for color change and rash. Neurological: Negative for weakness and headaches. Psychiatric/Behavioral: Negative for agitation and behavioral problems. Vitals:    09/28/20 2116   BP: (!) 139/57   Pulse: (!) 52   Resp: 17   Temp: 97.7 °F (36.5 °C)   SpO2: 97%   Weight: 73.9 kg (163 lb)   Height: 5' 5\" (1.651 m)            Physical Exam  Vitals signs and nursing note reviewed. Constitutional:       General: She is not in acute distress. Appearance: Normal appearance. HENT:      Head: Normocephalic. Nose: Nose normal.      Mouth/Throat:      Mouth: Mucous membranes are moist.      Pharynx: Oropharynx is clear.      Eyes:      Extraocular Movements: Extraocular movements intact. Pupils: Pupils are equal, round, and reactive to light. Neck:      Musculoskeletal: Normal range of motion and neck supple. No muscular tenderness. Cardiovascular:      Rate and Rhythm: Normal rate and regular rhythm. Pulses: Normal pulses. Heart sounds: Normal heart sounds. Pulmonary:      Effort: Pulmonary effort is normal.      Breath sounds: Normal breath sounds. Abdominal:      Palpations: Abdomen is soft. Tenderness: There is no abdominal tenderness. Musculoskeletal: Normal range of motion. Right lower leg: No edema. Left lower leg: No edema. Skin:     General: Skin is warm and dry. Capillary Refill: Capillary refill takes less than 2 seconds. Neurological:      General: No focal deficit present. Mental Status: She is alert and oriented to person, place, and time. Psychiatric:         Mood and Affect: Mood normal.         Behavior: Behavior normal.          MDM  Number of Diagnoses or Management Options  Diagnosis management comments: 68-year-old female presents as above after slip and fall with right upper lip laceration. This was repaired in the emergency department without complication. No other injuries were apparent on exam or work-up. Will discharge instructions to care for her sutures and return if needed. Wound Closure by Adhesive    Date/Time: 9/28/2020 11:00 PM  Performed by: Susan De La Cruz MD  Authorized by: Susan De La Cruz MD     Consent:     Consent obtained:  Verbal    Consent given by:  Patient    Risks discussed:  Infection, poor cosmetic result and poor wound healing    Alternatives discussed:  No treatment  Anesthesia (see MAR for exact dosages):      Anesthesia method:  Local infiltration    Local anesthetic:  Lidocaine 1% WITH epi  Laceration details:     Location:  Lip    Lip location:  Upper exterior lip    Length (cm):  1  Repair type:     Repair type:  Simple  Pre-procedure details: Preparation:  Patient was prepped and draped in usual sterile fashion  Exploration:     Hemostasis achieved with:  Direct pressure    Wound exploration: wound explored through full range of motion      Wound extent: no areolar tissue violation noted, no fascia violation noted, no foreign bodies/material noted, no muscle damage noted, no nerve damage noted, no tendon damage noted, no underlying fracture noted and no vascular damage noted      Contaminated: no    Treatment:     Area cleansed with:  Hibiclens    Amount of cleaning:  Standard  Mucous membrane repair:     Suture size:  5-0    Suture material:  Chromic gut    Suture technique:  Simple interrupted    Number of sutures:  2  Approximation:     Approximation:  Close    Vermilion border: well-aligned    Post-procedure details:     Dressing:  Open (no dressing)  Comments:      Wound repaired as above with good alignment of wet dry border. There was no violation of vermilion border as the wound extends up to, but did not cross the border.

## 2020-09-29 NOTE — DISCHARGE INSTRUCTIONS
Please keep your sutured wound clean and dry. You may shower after 24 hours. Be sure to dry the wound thoroughly when it is wet. Please avoid submerging the wound such as in bathtubs, hot tubs, swimming pools, the beach, until the sutures have been removed. After the sutures have been removed you may use sunscreen for the next 3 to 6 months to minimize scarring. Please return to the emergency department immediately for signs of infection such as fever, increasing redness, increasing pain, purulent discharge, or other concerns. Please avoid chewing on your sutures. The sutures which have been placed are dissolvable and should dissolve and fall out in the next 4 to 7 days.   Should they persist beyond 1 week please follow-up with your primary care doctor or return to the emergency room for wound check and potential suture removal.

## 2020-12-23 ENCOUNTER — TRANSCRIBE ORDER (OUTPATIENT)
Dept: SCHEDULING | Age: 70
End: 2020-12-23

## 2020-12-23 DIAGNOSIS — T84.84XA PAINFUL TOTAL KNEE REPLACEMENT, LEFT (HCC): Primary | ICD-10-CM

## 2020-12-23 DIAGNOSIS — Z96.652 PAINFUL TOTAL KNEE REPLACEMENT, LEFT (HCC): Primary | ICD-10-CM

## 2021-01-05 ENCOUNTER — HOSPITAL ENCOUNTER (OUTPATIENT)
Dept: NUCLEAR MEDICINE | Age: 71
Discharge: HOME | End: 2021-01-05
Attending: ORTHOPAEDIC SURGERY
Payer: MEDICARE

## 2021-01-05 DIAGNOSIS — T84.84XA PAINFUL TOTAL KNEE REPLACEMENT, LEFT (HCC): ICD-10-CM

## 2021-01-05 DIAGNOSIS — Z96.652 PAINFUL TOTAL KNEE REPLACEMENT, LEFT (HCC): ICD-10-CM

## 2021-01-05 PROCEDURE — 78300 BONE IMAGING LIMITED AREA: CPT

## 2022-03-08 ENCOUNTER — OFFICE VISIT (OUTPATIENT)
Dept: ORTHOPEDIC SURGERY | Age: 72
End: 2022-03-08
Payer: MEDICARE

## 2022-03-08 DIAGNOSIS — Z96.652 STATUS POST TOTAL LEFT KNEE REPLACEMENT: Primary | ICD-10-CM

## 2022-03-08 DIAGNOSIS — T84.498A: ICD-10-CM

## 2022-03-08 PROCEDURE — 1090F PRES/ABSN URINE INCON ASSESS: CPT | Performed by: PHYSICIAN ASSISTANT

## 2022-03-08 PROCEDURE — 1101F PT FALLS ASSESS-DOCD LE1/YR: CPT | Performed by: PHYSICIAN ASSISTANT

## 2022-03-08 PROCEDURE — G8432 DEP SCR NOT DOC, RNG: HCPCS | Performed by: PHYSICIAN ASSISTANT

## 2022-03-08 PROCEDURE — G8419 CALC BMI OUT NRM PARAM NOF/U: HCPCS | Performed by: PHYSICIAN ASSISTANT

## 2022-03-08 PROCEDURE — 3017F COLORECTAL CA SCREEN DOC REV: CPT | Performed by: PHYSICIAN ASSISTANT

## 2022-03-08 PROCEDURE — G8400 PT W/DXA NO RESULTS DOC: HCPCS | Performed by: PHYSICIAN ASSISTANT

## 2022-03-08 PROCEDURE — 99212 OFFICE O/P EST SF 10 MIN: CPT | Performed by: PHYSICIAN ASSISTANT

## 2022-03-08 PROCEDURE — G8536 NO DOC ELDER MAL SCRN: HCPCS | Performed by: PHYSICIAN ASSISTANT

## 2022-03-08 PROCEDURE — G8427 DOCREV CUR MEDS BY ELIG CLIN: HCPCS | Performed by: PHYSICIAN ASSISTANT

## 2022-03-09 VITALS — HEIGHT: 65 IN | WEIGHT: 153 LBS | BODY MASS INDEX: 25.49 KG/M2

## 2022-03-09 NOTE — PROGRESS NOTES
Darshan Rodriguez (: 1950) is a 66 y.o. female, established patient, here for evaluation of the following chief complaint(s):  Knee Pain       SUBJECTIVE/OBJECTIVE:  Darshan Rodriguez (: 1950) is a 66 y.o. female. Patient returns for annual follow-up of left total knee replacement. Patient had left total knee replacement performed in years past.  Patient has a bone scan which indicates there is some medial tibial plateau loosening. Patient states that she is not experiencing any knee pain whatsoever. Patient states she is able to do any activities which she desires and that her knee is not a limiting factor. Patient does not take any pain medication on a routine basis for knee pain. She denies any swelling or pain of the right knee. Overall, patient states she is very pleased with the results of her surgery and the progress which she has made to date. Allergies   Allergen Reactions    Aspirin Anaphylaxis    Mushroom Hives       Current Outpatient Medications   Medication Sig    valACYclovir (VALTREX) 500 mg tablet Take  by mouth two (2) times a day.  omega-3 fatty acids-vitamin e (FISH OIL) 1,000 mg cap Take 1 Cap by mouth.  zoledronic acid (RECLAST) 5 mg/100 mL soln 5 mg by IntraVENous route once.  multivitamin (ONE A DAY) tablet Take 1 tablet by mouth daily.  nadolol (CORGARD) 20 mg tablet Take  by mouth daily.  predniSONE (DELTASONE) 1 mg tablet Take 5 mg by mouth daily.  Iron CpaZac-A-Q85-Ca-Suc-Stoma (MULTIGEN) tablet Take 1 Tab by mouth daily.  cetirizine (ZYRTEC) 10 mg tablet Take 5 mg by mouth daily.  CALCIUM CARBONATE/VITAMIN D3 (CALCIUM 600 + D,3, PO) Take  by mouth. No current facility-administered medications for this visit.        Social History     Socioeconomic History    Marital status:      Spouse name: Not on file    Number of children: Not on file    Years of education: Not on file    Highest education level: Not on file Occupational History    Not on file   Tobacco Use    Smoking status: Never Smoker    Smokeless tobacco: Never Used   Substance and Sexual Activity    Alcohol use: No    Drug use: Never    Sexual activity: Not on file   Other Topics Concern    Not on file   Social History Narrative    Not on file     Social Determinants of Health     Financial Resource Strain:     Difficulty of Paying Living Expenses: Not on file   Food Insecurity:     Worried About Running Out of Food in the Last Year: Not on file    Mau of Food in the Last Year: Not on file   Transportation Needs:     Lack of Transportation (Medical): Not on file    Lack of Transportation (Non-Medical):  Not on file   Physical Activity:     Days of Exercise per Week: Not on file    Minutes of Exercise per Session: Not on file   Stress:     Feeling of Stress : Not on file   Social Connections:     Frequency of Communication with Friends and Family: Not on file    Frequency of Social Gatherings with Friends and Family: Not on file    Attends Orthodoxy Services: Not on file    Active Member of [address] [place] [address] or Organizations: Not on file    Attends Club or Organization Meetings: Not on file    Marital Status: Not on file   Intimate Partner Violence:     Fear of Current or Ex-Partner: Not on file    Emotionally Abused: Not on file    Physically Abused: Not on file    Sexually Abused: Not on file   Housing Stability:     Unable to Pay for Housing in the Last Year: Not on file    Number of [place] in the Last Year: Not on file    Unstable Housing in the Last Year: Not on file       Past Surgical History:   Procedure Laterality Date    COLONOSCOPY N/A 2/1/2017    COLONOSCOPY performed by Jaja Christianson MD at [address] HX GI      banding varicies    HX GYN      tubaligation    HX ORTHOPAEDIC      knee replacement    HX OTHER SURGICAL      partial thyroidectomy 1999    [place] BREAST SURGERY PROCEDURE UNLISTED      lumpectomy Family History   Problem Relation Age of Onset    Cancer Mother         OB History    No obstetric history on file. REVIEW OF SYSTEMS:    Patient denies any recent fever, chills, nausea, vomiting, chest pain, abdominal pain, blurred vision, dizziness or shortness of breath. Vitals:  Ht 5' 5\" (1.651 m)   Wt 153 lb (69.4 kg)   BMI 25.46 kg/m²    Body mass index is 25.46 kg/m². PHYSICAL EXAM:    The patient is alert and oriented x 3 and in no acute distress. The patient ambulates with normal gait without gait aids. Range of motion of the operative knee demonstrates full extension with flexion to 120 degrees. There is no excessive warmth, erythema or tenderness about the knee. There is 2+ varus/valgus instability that 0, 30, 60 and 90 degrees. There is 1+ anterior posterior instability and 0, 60 and 90 degrees. There is no gross instability. There is no calf tenderness. Distal pulses, motor and sensation is intact. IMAGING:    Results from Appointment encounter on 03/08/22    XR KNEE LT 3 V    Narrative  AP standing, lateral and sunrise digital view radiographs of the left knee obtained in the office today and reviewed with the patient demonstrate anatomic alignment of components. There is a radiolucent line at the implant bone interface of the medial tibial plateau as well as the posterior flange of the femoral component. No evidence of gross loosening or implant failure. No acute bony abnormality. Orders Placed This Encounter    XR KNEE LT 3 V     Standing Status:   Future     Number of Occurrences:   1     Standing Expiration Date:   3/9/2023     Order Specific Question:   Reason for Exam     Answer:   room 2C          ASSESSMENT/PLAN:      1. Status post total left knee replacement  -     XR KNEE LT 3 V; Future  2.  Loosening of musculoskeletal implant [facility])        Below is the assessment and plan developed based on review of pertinent history, physical exam, labs, studies, and medications. Discussed radiographic findings and answered all patient questions to her satisfaction. Patient was encouraged to remain active and to be participate in low impact activities as tolerated. Will plan on follow up in 1 years time for annual follow up or on an as-needed basis. Patient is doing well at this time is not exhibiting any symptoms of component loosening. Patient was asked to contact the office with any questions or concerns. The patient understands and agrees to the treatment plan as outlined above. Return in about 1 year (around 3/8/2023). Dr. Flower Lopes was available for immediate consultation as needed. An electronic signature was used to authenticate this note.   -- Audrey Lagos PA-C

## 2022-11-24 ENCOUNTER — HOSPITAL ENCOUNTER (EMERGENCY)
Age: 72
Discharge: HOME | End: 2022-11-24
Attending: EMERGENCY MEDICINE
Payer: MEDICARE

## 2022-11-24 VITALS
DIASTOLIC BLOOD PRESSURE: 85 MMHG | BODY MASS INDEX: 23.66 KG/M2 | RESPIRATION RATE: 18 BRPM | SYSTOLIC BLOOD PRESSURE: 108 MMHG | OXYGEN SATURATION: 96 % | HEIGHT: 65 IN | HEART RATE: 77 BPM | TEMPERATURE: 98.2 F | WEIGHT: 142 LBS

## 2022-11-24 DIAGNOSIS — S61.012A LACERATION OF LEFT THUMB WITHOUT FOREIGN BODY WITHOUT DAMAGE TO NAIL, INITIAL ENCOUNTER: Primary | ICD-10-CM

## 2022-11-24 PROCEDURE — 99282 EMERGENCY DEPT VISIT SF MDM: CPT

## 2022-11-24 PROCEDURE — 74011000250 HC RX REV CODE- 250: Performed by: EMERGENCY MEDICINE

## 2022-11-24 PROCEDURE — 75810000293 HC SIMP/SUPERF WND  RPR

## 2022-11-24 RX ORDER — LIDOCAINE HYDROCHLORIDE 10 MG/ML
5 INJECTION, SOLUTION EPIDURAL; INFILTRATION; INTRACAUDAL; PERINEURAL ONCE
Status: COMPLETED | OUTPATIENT
Start: 2022-11-24 | End: 2022-11-24

## 2022-11-24 RX ORDER — BUMETANIDE 1 MG/1
1 TABLET ORAL DAILY
COMMUNITY
Start: 2022-11-10

## 2022-11-24 RX ORDER — AMOXICILLIN 500 MG/1
500 CAPSULE ORAL 2 TIMES DAILY
COMMUNITY
Start: 2022-09-22

## 2022-11-24 RX ADMIN — LIDOCAINE HYDROCHLORIDE 5 ML: 10 INJECTION, SOLUTION EPIDURAL; INFILTRATION; INTRACAUDAL; PERINEURAL at 19:43

## 2022-11-25 NOTE — ED TRIAGE NOTES
The pt was cutting potatoes around 1130 hrs. The pt cut into her left thumb and was still bleeding after changing dressing tonight.  Brigitte HAIRSTON

## 2022-11-25 NOTE — ED NOTES
The patient was discharged home by Gilmar Peterson MD in stable condition. The patient is alert and oriented, in no respiratory distress and discharge vital signs obtained. The patient's diagnosis, condition and treatment were explained. The patient expressed understanding. No prescriptions given. No work/school note given. A discharge plan has been developed. A  was not involved in the process. Aftercare instructions were given. Pt ambulatory out of the ED. Pt discharged from the ED with family.

## 2022-11-25 NOTE — DISCHARGE INSTRUCTIONS
Return to the emergency department with any new or worsening symptoms. Please keep your thumb is clean and dry as possible. Follow-up with your doctor to have your sutures removed in 1 week.

## 2022-11-25 NOTE — ED PROVIDER NOTES
HPI   23-year-old female with a past medical history of autoimmune liver disease and TTP who presents to the emergency department due to a left thumb laceration. Patient was making some potatoes today. She cut her thumb with a knife and cutting the potatoes. She wrapped it up in gauze and when she took the gauze off this evening her thumb was still bleeding which is what prompted her to come to the emergency department. The knife was intact. Last tetanus shot was 3 years ago. She endorses minimal pain. No blood thinners.   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    Esophageal varices (HCC)     Liver disease     autoimmune liver disease    Portal hypertension ([facility] [address]        Past Surgical History:   Procedure Laterality Date    COLONOSCOPY N/A 2/1/2017    COLONOSCOPY performed by Joyce Mazariegos MD at [facility] ENDOSCOPY    HX GI      banding varicies    HX GYN      tubaligation    HX ORTHOPAEDIC      knee replacement    HX OTHER SURGICAL      partial thyroidectomy 1999    [place] BREAST SURGERY PROCEDURE UNLISTED      lumpectomy         Family History:   Problem Relation Age of Onset    Cancer Mother        Social History     Socioeconomic History    Marital status:      Spouse name: Not on file    Number of children: Not on file    Years of education: Not on file    Highest education level: Not on file   Occupational History    Not on file   Tobacco Use    Smoking status: Never    Smokeless tobacco: Never   Substance and Sexual Activity    Alcohol use: No    Drug use: Never    Sexual activity: Not on file   Other Topics Concern    Not on file   Social History Narrative    Not on file     Social Determinants of Health     Financial Resource Strain: Not on file   Food Insecurity: Not on file   Transportation Needs: Not on file   Physical Activity: Not on file   Stress: Not on file   Social Connections: Not on file   Intimate Partner Violence: Not on file   Housing Stability: Not on file         ALLERGIES: Aspirin and Mushroom    Review of Systems  All systems reviewed are negative unless otherwise documented in the HPI  Vitals:    11/24/22 1928   BP: 108/85   Pulse: 77   Resp: 18   Temp: 98.2 °F (36.8 °C)   SpO2: 96%   Weight: 64.4 kg (142 lb)   Height: 5' 5\" (1.651 m)            Physical Exam  Constitutional:       Comments: Seen comfortably no acute distress   Eyes:      General: No scleral icterus. Extraocular Movements: Extraocular movements intact. Neck:      Comments: Trachea midline  Cardiovascular:      Comments: Regular rate and rhythm without murmurs. Normal capillary refill in the distal aspect of the left thumb  Pulmonary:      Effort: Pulmonary effort is normal. No respiratory distress. Musculoskeletal:         General: No deformity. Normal range of motion. Cervical back: Normal range of motion. Skin:     General: Skin is warm and dry. Comments: 1.5 cm laceration on the ulnar aspect of the left thumb. There is a slow ooze of blood. No nailbed involvement. No foreign bodies. Neurological:      Comments: Awake and alert. GCS 15        MDM  23-year-old woman who presents with the above chief complaint. Vitals are stable. She has about a 1.5 cm laceration on the ulnar aspect of the left thumb without nailbed involvement. Laceration repaired. Hemostasis achieved. Patient discharged in stable condition. Wound Repair    Date/Time: 11/24/2022 8:50 PM  Performed by: attendingLocation details: left thumb  Wound length:2.5 cm or less  Anesthesia: local infiltration    Anesthesia:  Local Anesthetic: lidocaine 1% without epinephrine  Anesthetic total: 3 mL  Foreign bodies: no foreign bodies  Irrigation solution: saline  Irrigation method: jet lavage  Debridement: moderate  Skin closure: 5-0 nylon  Number of sutures: 5  Technique: simple  Approximation: close  Dressing: Band-Aid.   Patient tolerance: patient tolerated the procedure well with no immediate complications  My total time at bedside, performing this procedure was 1-15 minutes.

## 2023-01-31 ENCOUNTER — DOCUMENTATION ONLY (OUTPATIENT)
Dept: HEMATOLOGY | Age: 73
End: 2023-01-31

## 2023-01-31 ENCOUNTER — OFFICE VISIT (OUTPATIENT)
Dept: HEMATOLOGY | Age: 73
End: 2023-01-31
Payer: MEDICARE

## 2023-01-31 VITALS
BODY MASS INDEX: 24.61 KG/M2 | WEIGHT: 147.7 LBS | SYSTOLIC BLOOD PRESSURE: 103 MMHG | TEMPERATURE: 97.7 F | HEART RATE: 62 BPM | RESPIRATION RATE: 18 BRPM | DIASTOLIC BLOOD PRESSURE: 63 MMHG | HEIGHT: 65 IN | OXYGEN SATURATION: 93 %

## 2023-01-31 DIAGNOSIS — K74.60 CIRRHOSIS OF LIVER WITHOUT ASCITES, UNSPECIFIED HEPATIC CIRRHOSIS TYPE (HCC): Primary | ICD-10-CM

## 2023-01-31 DIAGNOSIS — K75.4 AUTOIMMUNE HEPATITIS (HCC): ICD-10-CM

## 2023-01-31 DIAGNOSIS — Z11.59 ENCOUNTER FOR SCREENING FOR OTHER VIRAL DISEASES: ICD-10-CM

## 2023-01-31 PROBLEM — B00.9 HERPES: Status: ACTIVE | Noted: 2023-01-31

## 2023-01-31 PROBLEM — K76.6 PORTAL HYPERTENSION (HCC): Status: ACTIVE | Noted: 2023-01-31

## 2023-01-31 PROBLEM — D59.10 AIHA (AUTOIMMUNE HEMOLYTIC ANEMIA) (HCC): Status: ACTIVE | Noted: 2023-01-31

## 2023-01-31 PROBLEM — Z87.19 HISTORY OF ESOPHAGEAL VARICES WITH BLEEDING: Status: ACTIVE | Noted: 2023-01-31

## 2023-01-31 PROBLEM — D69.3 CHRONIC ITP (IDIOPATHIC THROMBOCYTOPENIA) (HCC): Status: ACTIVE | Noted: 2023-01-31

## 2023-01-31 PROCEDURE — 1123F ACP DISCUSS/DSCN MKR DOCD: CPT | Performed by: NURSE PRACTITIONER

## 2023-01-31 PROCEDURE — G8536 NO DOC ELDER MAL SCRN: HCPCS | Performed by: NURSE PRACTITIONER

## 2023-01-31 PROCEDURE — 99204 OFFICE O/P NEW MOD 45 MIN: CPT | Performed by: NURSE PRACTITIONER

## 2023-01-31 PROCEDURE — G8427 DOCREV CUR MEDS BY ELIG CLIN: HCPCS | Performed by: NURSE PRACTITIONER

## 2023-01-31 PROCEDURE — 1101F PT FALLS ASSESS-DOCD LE1/YR: CPT | Performed by: NURSE PRACTITIONER

## 2023-01-31 PROCEDURE — G8420 CALC BMI NORM PARAMETERS: HCPCS | Performed by: NURSE PRACTITIONER

## 2023-01-31 PROCEDURE — G8510 SCR DEP NEG, NO PLAN REQD: HCPCS | Performed by: NURSE PRACTITIONER

## 2023-01-31 PROCEDURE — G8400 PT W/DXA NO RESULTS DOC: HCPCS | Performed by: NURSE PRACTITIONER

## 2023-01-31 PROCEDURE — 3017F COLORECTAL CA SCREEN DOC REV: CPT | Performed by: NURSE PRACTITIONER

## 2023-01-31 PROCEDURE — G0463 HOSPITAL OUTPT CLINIC VISIT: HCPCS | Performed by: INTERNAL MEDICINE

## 2023-01-31 PROCEDURE — 1090F PRES/ABSN URINE INCON ASSESS: CPT | Performed by: NURSE PRACTITIONER

## 2023-01-31 RX ORDER — CETIRIZINE HYDROCHLORIDE 10 MG/1
5 TABLET ORAL
COMMUNITY

## 2023-01-31 RX ORDER — LANOLIN ALCOHOL/MO/W.PET/CERES
CREAM (GRAM) TOPICAL
COMMUNITY

## 2023-01-31 RX ORDER — GLUCOSAMINE SULFATE 1500 MG
1000 POWDER IN PACKET (EA) ORAL DAILY
COMMUNITY

## 2023-01-31 NOTE — PROGRESS NOTES
[address] [facility], MD, FACP, [facility], [place]      EDI Pierce, [facility]-BC   Diana Vila, [facility]   Laura Bonner, FNCATALION-SONNY Lewis FNP-C   Lazarus Corado, AGPCNP-BC      [address]   at [address], [facility] Melanie Sanchez  22.   [phone]   FAX: [address]   at [facility]   [address], [address]   [place], [address] [place] [address] Street - [address] [phone]   FAX: [phone]           Patient Care Team:  Edel Jackson MD as PCP - General (Family Medicine)      Problem List  Date Reviewed: 3/8/2022            Codes Class Noted    Autoimmune hepatitis [facility]) ICD-10-CM: K75.4  ICD-9-CM: 571.42  2/1/2023        Chronic ITP (idiopathic thrombocytopenia) (HCC) ICD-10-CM: D69.3  ICD-9-CM: 287.31  1/31/2023        Cirrhosis of liver without ascites ([facility] ICD-10-CM: K74.60  ICD-9-CM: 571.5  1/31/2023        Herpes ICD-10-CM: B00.9  ICD-9-CM: 054.9  1/31/2023        Portal hypertension ([facility] ICD-10-CM: K76.6  ICD-9-CM: 572.3  1/31/2023        History of esophageal varices with bleeding ICD-10-CM: Z87.19  ICD-9-CM: V12.79  1/31/2023         Lost liver doctor  Cardiology has mitral valve leak and want to do a TEEcho  Dr. Thais Avila an EGD before, last EGD was 10/2019 Dr. Deedee Weber, no varices last banding in 2014. Last U/S 5/2020 Cirrhosis and no liver mass. Saw Dr. Ramos De Los Santos in early 80's, transferred to Dr. Betsy Andrews who retired in 2020, has not been seen since 5/2020. PCP started her on fluid pills because of abdominal swelling and SOB, needs U/S  Abdominal swelling  +1 pitting edema  increase to step 1 diuretics        AIH. She has been in biochemical remission for 20+ years. The trivial dose of prednisone is for her ITP. 2. Cirrhosis. Child A and stable for 20+ years.   1989 diagnosed    3. [facility]. Surveillance. She is well-past due for US; will schedule. 4. Varices. She has not required band ligation for 7 years (2014). She is on nadolol. I think she can defer repeat exam for 2 years. The clinicians listed above have   asked me to see Marjorie Munoz in consultation regarding   autoimmune hepatitis. autoimmune hepatitis overlap syndrome. All medical records sent by the referring physicians were reviewed   including imaging studies   and pathology. No medical records were available for review when the patient was here for the appointment. The patient is a 74 y.o.  female   who was found to have abnormalities in   liver transaminases   and   alkaline phosphatase   in   ***/***. Serologic evaluation for markers of chronic liver disease   has either not been performed or the results are not available. was negative. was negative for   was positive for   HCV,   HBV,   GABY,   ASMA,   AMA,   ANCA,   a low   ceruloplasmin,   alpha-1-antitrypsin,   an elevation in   ferritin,    FE saturation. The most recent imaging of the liver was   Ultrasound   CT   MRI   performed in ***/***. Results   suggest   are not available at this time. The patient believes this demonstrated   that the liver is normal.    chronic liver disease. fatty liver disease. cirrhosis. No liver mass lesions noted. A liver biopsy   has not been performed. was performed in ***/***. The results are not available. The patient is not aware of the results. The patient reports this demonstrated   This demonstrated   mild steatosis without HARRELL  HARRELL with   active hepatitis with   minimal inflammation and   bile duct injury consistent with PBC with   portal changes consistent with PSC with   excessive iron deposition   no fibrosis. portal fibrosis. pericellular fibrosis. bridging fibrosis. cirrhosis.         The patient   has not been treated with any medications to date. has been treated with   ursodeoxycholic acid. prednisone. Imuran. Cellcept. tacrolimus. cyclosporin. The treatment   was successful with normalization of serum liver enzymes. has lowered but not normalized the serum liver enzymes. was not successful. The liver enzymes have remained elevated. In the office today the patient has the following symptoms:  The patient feels well and has no complaints. fatigue,   fevers,   chills,   shortness of breath,   chest pain,   pain in the right side over the liver,   diffuse abdominal pain,   nausea,   vomiting,   constipation,   diarrhea,   dry eyes,   dry mouth,   arthralgias,   myalgias,   yellowing of the eyes or skin,   itching,   dark urine,   problems concentrating,   swelling of the abdomen,   swelling of the lower extremities,   hematemesis,   hematochezia. The patient is not experiencing the following symptoms which are commonly seen with this liver disorder:   fatigue,   fevers,   chills,   shortness of breath,   chest pain,   pain in the right side over the liver,   diffuse abdominal pain,   nausea,   vomiting,   constipation,   diarrhea,   dry eyes,   dry mouth,   arthralgias,   myalgias,   yellowing of the eyes or skin,   itching,   dark urine,   problems concentrating,   swelling of the abdomen,   swelling of the lower extremities,   hematemesis,   hematochezia. The patient   completes all daily activities without any functional limitations. has   Mild   Moderate   Severe   limitations in functional activities which can be attributed to   the liver disease   and   to other medical problems that are not related to the liver disease. ASSESSMENT AND PLAN:  Autoimmune Hepatitis   The diagnosis was based upon  laboratory studies  serology  imaging  liver biopsy  other coexistant autoimmune disorders    The histologic severity has not been defined.       A liver biopsy performed in ***/*** shows mild   moderate   severe   inflammation   with interface hepatitis   with piecemeal necrosis   and   no fibrosis  stage 1 portal fibrosis. stage 2 septal fibrosis. Stage 3 bridging fibrosis. Cirrhosis. Fibroscan performed in ***/*** demonstrated a liver stiffness of *** consistent with   US shear wave Elastography performed in ***/*** demonstrated a liver stiffness of *** suggesting   stage *** fibrosis  Cirrhosis. The patient   is not currently receiving treatment for the liver disease. is receiving treatment with   urosdeoxycholic acid  prednisone  azathioprine  Cellcept  cyclosporine  tacrolimus    Liver transaminases are   normal.    elevated. AST is   normal.    elevated. ALT is   normal.    elevated. ALP is   normal.    elevated. Liver function is   normal.    depressed. Total bilirubin is   normal.    elevated. Serum albumin is   normal.    depressed. INR is   normal.    prolonged. The platelet count is   normal.    depressed. Based upon laboratory studies   Fibroscan,   Elastography,   and imaging    the patient   does not appear to have   appears to have   may have   significant liver injury. advanced liver disease. cirrhosis. The patient   is responding to   and is tolerating the treatment without significant side effects. the treatment but having side effects which include ***  is not responding to the treatment. There is currently no indication to initiate immune suppressive treatment for AIH at this time. Will monitor the liver enzymes every 6 months and Fibroscan annually. If the liver enzymes increase and remain intermittently or chronically elevated or if the liver stiffness increases stepwise then immune suppression should be initiated. The patient will not require a repeat biopsy prior to initiating immune suppression unless this has been move than 3 years or if the clinical and/or laboratory situation has changed.     Will perform laboratory testing to monitor liver function and degree of liver injury. This will include  BMP,   hepatic panel,   CBC with platelet count,   INR. Will measure blood level of immune suppression medications. Will perform   serologic   and   virologic   studies   to assess for other causes of chronic liver disease. Will perform imaging of the liver with   ultrasound. triple phase CT scan. dynamic MRI. The need to perform a liver biopsy to confirm the diagnosis, assess severity, and determine treatment options were discussed. The risks of performing the liver biopsy including pain, puncture of the lung, gallbladder, intestine or kidney and bleeding were discussed. The patient has decided to have a liver biopsy. This will be scheduled. Will defer liver biopsy for now. There is no reason to perform liver biopsy at this time. Liver chemistries will be monitored. The patient had a liver biopsy performed in ***. We will request that the liver biopsy slides sent to me for my personal review. Will perform imaging of the liver with   ultrasound. triple phase CT scan. dynamic MRI. MRI and MRCP because of persistent elevation in ALP and possible bile duct disease. The need to perform a liver biopsy to help determine the cause and severity of the liver test abnormalities was discussed. The risks of performing the liver biopsy including pain, puncture of the lung, gallbladder, intestine or kidney and bleeding were discussed. The patient has decided to have a liver biopsy. This will be scheduled. Will defer liver biopsy for now. I have recommended that we proceed with liver biopsy but the patient has decided to defer this for now. There is no reason to perform liver biopsy at this time. Liver chemistries will be monitored.       If the liver enzymes remain persistently elevated over the next 1-2 years a liver biopsy should be performed to ensure there is no ongoing chronic liver disease. The patient had a liver biopsy performed in ***. Will request that the liver biopsy slides sent be to me for my personal review. Screening for Hepatocellular Carcinoma  HCC screening   is not necessary if the patient has no evidence of cirrhosis. has not been not been performed   since ***/***.  was performed in ***/*** and   does not suggest [address] demonstrates   an elevation in AFP. a lesion on ultrasound. AFP was ordered today and ultrasound will be scheduled. Will repeat ultrasound in 6 months. Will perform   triple phase CT scan      dynamic MRI   to further characterize the lesion and help determine if this is HCC. Treatment of other medical problems in patients with chronic liver disease  There are no contraindications for the patient to take most medications that are necessary for treatment of other medical issues. The patient has cirrhrosis and should avoid taking NSAIDs which are associated with a higher rate of developing MARICRUZ. The patient had HE and should avoid taking Benzodiazapines which could exacerbate HE. The patient can take   any medications utilized for treatment of DM  statins to treat hypercholesterolemia    The patient has alcohol induced liver disease but has been abstinent from alcohol for greater than 6 months. Normal doses of acetaminophen, as recommended on the label of the bottle, are not hepatotoxic except in the setting of daily alcohol use, even in patients with cirrhosis and can be utilized for pain. The patient consumes alcohol on a daily basis or has recently stopped consuming alcohol. Regular alcohol use increases the risk of toxicity from acetaminophen. This analgesic should be avoided until the patient has been abstinent from alcohol for 6 months.         Counseling for alcohol in patients with chronic liver disease  The patient was counseled regarding alcohol consumption and the effect of alcohol on chronic liver disease. The patient has cirrhosis and was advised to be abstinent from all alcohol including non-alcoholic beer which does contain some alcohol. The patient does not consume any significant amount of alcohol. The patient has not consumed alcohol since ***. The patient has continued to consume alcohol   daily. on rare social occasions. The patient was reminded that alcohol can cause fatty liver. Patients who have undergone obesity surgery are at much greater risk to develop alcohol induced liver injury. The patient does not have a chronic liver disease and does not have to be abstinent from alcohol. The patient consumes too much alcohol and is at risk to develop alcohol induced liver liver injury. It was recommended that all alcohol consumption be stopped and the patient be abstinent from alcohol for at least *** months. If the patient cannot stop consuming alcohol then there is an aclohol use disorder and the patient should consider entering alcohol counseling and/or attending AA. The patient has an alcohol abuse disorder and it was suggested that they enter alcohol counseling and/or attend AA. If the patient cannot stop drinking alcohol they cannot be considered a candidate for liver transplant. The patient will need to attend alcohol counceling prior to being accepted as a liver transplant candidate. Vaccinations   Vaccination for viral hepatitis A and B is recommended since the patient has no serologic evidence of previous exposure or vaccination with immunity. Vaccination for viral hepatitis A and B is not needed. The patient has serologic evidence of prior exposure or vaccination with immunity. Vaccination for viral hepatitis A and B has been initiated. Vaccination for viral hepatitis A and B has been completed. Serologic studies will be performed to assess response to vaccine.   The need for vaccination against viral hepatitis A and B will be assessed with serologic and instituted as appropriate. Since the patient does not have a chronic liver disease which can lead to liver injury screening for HAV and HBV is not needed. The patient has   not   received   2 doses   and the booster dose   of COVID-19 vaccine. The patient should receive a booster dose of COVID-19 vaccine in ***/***. The patient had COVID-19 infection and recovered. The patient does not want to take COVID-19 vaccine. The patient was encouraged to take the COVID-19 vaccine. Routine vaccinations against other bacterial and viral agents can be performed as indicated. Annual flu vaccination should be administered if indicated. ALLERGIES  Allergies   Allergen Reactions    Aspirin Anaphylaxis    Mushroom Hives       MEDICATIONS  Current Outpatient Medications   Medication Sig    ferrous sulfate (Iron) 325 mg (65 mg iron) tablet Take  by mouth. Taking one tab twice a week    cetirizine (Aller-Maynor) 10 mg tablet Take 5 mg by mouth. Taking half of one tab daily    cholecalciferol (VITAMIN D3) 25 mcg (1,000 unit) cap Take 1,000 Units by mouth daily. bumetanide (BUMEX) 1 mg tablet Take 1 mg by mouth daily. valACYclovir (VALTREX) 500 mg tablet Take 500 mg by mouth every other day. multivitamin (ONE A DAY) tablet Take 1 tablet by mouth daily. nadolol (CORGARD) 20 mg tablet Take  by mouth daily. predniSONE (DELTASONE) 1 mg tablet Take 5 mg by mouth daily. zoledronic acid (RECLAST) 5 mg/100 mL pgbk 5 mg by IntraVENous route once. PRN     No current facility-administered medications for this visit. SYSTEM REVIEW NOT RELATED TO LIVER DISEASE OR REVIEWED ABOVE:  Constitution systems: Negative for fever, chills, weight gain, weight loss. Eyes: Negative for visual changes. ENT: Negative for sore throat, painful swallowing. Respiratory: Negative for cough, hemoptysis, SOB. Cardiology: Negative for chest pain, palpitations.   GI:  Negative for constipation or diarrhea. : Negative for urinary frequency, dysuria, hematuria, nocturia. Skin: Negative for rash. Hematology: Negative for easy bruising, blood clots. Musculo-skelatal: Negative for back pain, muscle pain, weakness. Neurologic: Negative for headaches, dizziness, vertigo, memory problems not related to HE. Psychology: Negative for anxiety, depression. FAMILY HISTORY:  The father   of COPD. The mother  of cancer of the spine. There is no family history of liver disease. There is no family history of immune disorders. [facility] mother    SOCIAL HISTORY:  The patient is . The patient has 1 child. and 3 grandchildren. The patient has never used tobacco products. The patient has never consumed significant amounts of alcohol. The patient retired in  she was a  for 40 years. PHYSICAL EXAMINATION:  Visit Vitals  /63 (BP 1 Location: Left upper arm, BP Patient Position: Sitting, BP Cuff Size: Adult)   Pulse 62   Temp 97.7 °F (36.5 °C) (Temporal)   Resp 18   Ht 5' 5\" (1.651 m)   Wt 147 lb 11.2 oz (67 kg)   SpO2 93%   BMI 24.58 kg/m²     General: No acute distress. Eyes: Sclera anicteric. ENT: No oral lesions. Thyroid normal.  Nodes: No adenopathy. Skin: No spider angiomata. No jaundice. No palmar erythema. Respiratory: Lungs clear to auscultation. Cardiovascular: Regular heart rate. No murmurs. No JVD. Abdomen: Soft non-tender. Liver size normal to percussion/palpation. Spleen not palpable. No obvious ascites. Extremities: No edema. No muscle wasting. No gross arthritic changes. Neurologic: Alert and oriented. Cranial nerves grossly intact. No asterixis. LABORATORY STUDIES:    2022 From   AST/ALT/ALP/T Bili/ALB:  50/34/137/1.1/3.7  WBC/HB/PLT/INR:  5.7/14.4/78//na  NA/BUN/CREAT:145/9/0.63      SEROLOGIES:  Not available or performed. Testing will be performed as indicated.     LIVER HISTOLOGY:  Not available or performed    ENDOSCOPIC PROCEDURES:  Not available or performed    RADIOLOGY:  5/2020. Ultrasound of liver. Echogenic consistent with cirrhosis. No liver mass lesions. No dilated bile ducts. OTHER TESTING:  Not available or performed    FOLLOW-UP:  All of the issues listed above in the Assessment and Plan were discussed with the patient. All questions were answered. The patient expressed a clear understanding of the above. [address] [id number] in ***   weeks   months   for Fibroscan   for elastography   2 weeks after liver biopsy. which should be 1-2 weeks after the next imaging study. and to initiate HCV treatment. to assess for the effects of diet changes and weight loss. to assess the effects and tolerability of ***.  for screening and enrollment into a clinical trial for treatment of ***. The patient was given a follow-up appointment for *** months in case she decides not to enter or is excluded from entering the clinical trial.  for routine monitoring. to review all data and determine the treatment plan.       CHOLO Grossman-C  Liver [facility], [address] [facility] Melanie Feldman  22.  [phone]  [address]

## 2023-01-31 NOTE — PROGRESS NOTES
Identified pt with two pt identifiers(name and ). Reviewed record in preparation for visit and have obtained necessary documentation. Chief Complaint   Patient presents with    New Patient     Establish care    Elevated Liver Enzymes      Vitals:    23 1124   BP: 103/63   Pulse: 62   Resp: 18   Temp: 97.7 °F (36.5 °C)   TempSrc: Temporal   SpO2: 93%   Weight: 147 lb 11.2 oz (67 kg)   Height: 5' 5\" (1.651 m)   PainSc:   2   PainLoc: Abdomen       Health Maintenance Review: Patient reminded of \"due or due soon\" health maintenance. I have asked the patient to contact his/her primary care provider (PCP) for follow-up on his/her health maintenance. Coordination of Care Questionnaire:  :   1) Have you been to an emergency room, urgent care, or hospitalized since your last visit? If yes, where when, and reason for visit? no       2. Have seen or consulted any other health care provider since your last visit? If yes, where when, and reason for visit? NO      Patient is accompanied by self I have received verbal consent from Elizabeth Wagoner to discuss any/all medical information while they are present in the room.

## 2023-02-01 PROBLEM — K75.4 AUTOIMMUNE HEPATITIS (HCC): Status: ACTIVE | Noted: 2023-02-01

## 2023-02-01 LAB
AFP L3 MFR SERPL: NORMAL % (ref 0–9.9)
AFP SERPL-MCNC: 2.7 NG/ML (ref 0–9.2)
ALBUMIN SERPL-MCNC: 3.7 G/DL (ref 3.7–4.7)
ALP SERPL-CCNC: 125 IU/L (ref 44–121)
ALT SERPL-CCNC: 34 IU/L (ref 0–32)
AST SERPL-CCNC: 61 IU/L (ref 0–40)
BASOPHILS # BLD AUTO: 0 X10E3/UL (ref 0–0.2)
BASOPHILS NFR BLD AUTO: 0 %
BILIRUB DIRECT SERPL-MCNC: 0.5 MG/DL (ref 0–0.4)
BILIRUB SERPL-MCNC: 1.5 MG/DL (ref 0–1.2)
BUN SERPL-MCNC: 10 MG/DL (ref 8–27)
BUN/CREAT SERPL: 17 (ref 12–28)
CALCIUM SERPL-MCNC: 9.3 MG/DL (ref 8.7–10.3)
CHLORIDE SERPL-SCNC: 100 MMOL/L (ref 96–106)
CO2 SERPL-SCNC: 27 MMOL/L (ref 20–29)
CREAT SERPL-MCNC: 0.58 MG/DL (ref 0.57–1)
EGFRCR SERPLBLD CKD-EPI 2021: 95 ML/MIN/1.73
EOSINOPHIL # BLD AUTO: 0 X10E3/UL (ref 0–0.4)
EOSINOPHIL NFR BLD AUTO: 0 %
ERYTHROCYTE [DISTWIDTH] IN BLOOD BY AUTOMATED COUNT: 13.4 % (ref 11.7–15.4)
GLUCOSE SERPL-MCNC: 79 MG/DL (ref 70–99)
HCT VFR BLD AUTO: 40.7 % (ref 34–46.6)
HGB BLD-MCNC: 14.5 G/DL (ref 11.1–15.9)
IMM GRANULOCYTES # BLD AUTO: 0 X10E3/UL (ref 0–0.1)
IMM GRANULOCYTES NFR BLD AUTO: 0 %
INR PPP: 1.1 (ref 0.9–1.2)
LYMPHOCYTES # BLD AUTO: 1.2 X10E3/UL (ref 0.7–3.1)
LYMPHOCYTES NFR BLD AUTO: 20 %
MCH RBC QN AUTO: 32.7 PG (ref 26.6–33)
MCHC RBC AUTO-ENTMCNC: 35.6 G/DL (ref 31.5–35.7)
MCV RBC AUTO: 92 FL (ref 79–97)
MONOCYTES # BLD AUTO: 0.5 X10E3/UL (ref 0.1–0.9)
MONOCYTES NFR BLD AUTO: 8 %
MORPHOLOGY BLD-IMP: NORMAL
NEUTROPHILS # BLD AUTO: 4.1 X10E3/UL (ref 1.4–7)
NEUTROPHILS NFR BLD AUTO: 72 %
PLATELET # BLD AUTO: NORMAL X10E3/UL
POTASSIUM SERPL-SCNC: 3.2 MMOL/L (ref 3.5–5.2)
PROT SERPL-MCNC: 7.5 G/DL (ref 6–8.5)
PROTHROMBIN TIME: 11.9 SEC (ref 9.1–12)
RBC # BLD AUTO: 4.43 X10E6/UL (ref 3.77–5.28)
SODIUM SERPL-SCNC: 141 MMOL/L (ref 134–144)
WBC # BLD AUTO: 5.8 X10E3/UL (ref 3.4–10.8)

## 2023-02-02 ENCOUNTER — ANESTHESIA (OUTPATIENT)
Dept: ENDOSCOPY | Age: 73
End: 2023-02-02
Payer: MEDICARE

## 2023-02-02 ENCOUNTER — HOSPITAL ENCOUNTER (OUTPATIENT)
Age: 73
Setting detail: OUTPATIENT SURGERY
Discharge: HOME | End: 2023-02-02
Attending: INTERNAL MEDICINE | Admitting: INTERNAL MEDICINE
Payer: MEDICARE

## 2023-02-02 ENCOUNTER — ANESTHESIA EVENT (OUTPATIENT)
Dept: ENDOSCOPY | Age: 73
End: 2023-02-02
Payer: MEDICARE

## 2023-02-02 VITALS
HEART RATE: 61 BPM | DIASTOLIC BLOOD PRESSURE: 98 MMHG | TEMPERATURE: 98.9 F | RESPIRATION RATE: 17 BRPM | OXYGEN SATURATION: 96 % | SYSTOLIC BLOOD PRESSURE: 130 MMHG

## 2023-02-02 DIAGNOSIS — I85.10 SECONDARY ESOPHAGEAL VARICES WITHOUT BLEEDING (HCC): Primary | ICD-10-CM

## 2023-02-02 PROCEDURE — 76040000019: Performed by: INTERNAL MEDICINE

## 2023-02-02 PROCEDURE — 2709999900 HC NON-CHARGEABLE SUPPLY: Performed by: INTERNAL MEDICINE

## 2023-02-02 PROCEDURE — 74011250636 HC RX REV CODE- 250/636: Performed by: NURSE ANESTHETIST, CERTIFIED REGISTERED

## 2023-02-02 PROCEDURE — 74011000250 HC RX REV CODE- 250: Performed by: NURSE ANESTHETIST, CERTIFIED REGISTERED

## 2023-02-02 PROCEDURE — 76060000031 HC ANESTHESIA FIRST 0.5 HR: Performed by: INTERNAL MEDICINE

## 2023-02-02 PROCEDURE — 43235 EGD DIAGNOSTIC BRUSH WASH: CPT | Performed by: INTERNAL MEDICINE

## 2023-02-02 RX ORDER — LIDOCAINE HYDROCHLORIDE 20 MG/ML
INJECTION, SOLUTION EPIDURAL; INFILTRATION; INTRACAUDAL; PERINEURAL AS NEEDED
Status: DISCONTINUED | OUTPATIENT
Start: 2023-02-02 | End: 2023-02-02 | Stop reason: HOSPADM

## 2023-02-02 RX ORDER — ATROPINE SULFATE 0.1 MG/ML
0.5 INJECTION INTRAVENOUS
Status: DISCONTINUED | OUTPATIENT
Start: 2023-02-02 | End: 2023-02-02 | Stop reason: HOSPADM

## 2023-02-02 RX ORDER — NALOXONE HYDROCHLORIDE 0.4 MG/ML
0.4 INJECTION, SOLUTION INTRAMUSCULAR; INTRAVENOUS; SUBCUTANEOUS
Status: DISCONTINUED | OUTPATIENT
Start: 2023-02-02 | End: 2023-02-02 | Stop reason: HOSPADM

## 2023-02-02 RX ORDER — DEXTROMETHORPHAN/PSEUDOEPHED 2.5-7.5/.8
1.2 DROPS ORAL
Status: DISCONTINUED | OUTPATIENT
Start: 2023-02-02 | End: 2023-02-02 | Stop reason: HOSPADM

## 2023-02-02 RX ORDER — FENTANYL CITRATE 50 UG/ML
50-200 INJECTION, SOLUTION INTRAMUSCULAR; INTRAVENOUS
Status: DISCONTINUED | OUTPATIENT
Start: 2023-02-02 | End: 2023-02-02 | Stop reason: HOSPADM

## 2023-02-02 RX ORDER — FLUMAZENIL 0.1 MG/ML
0.2 INJECTION INTRAVENOUS
Status: DISCONTINUED | OUTPATIENT
Start: 2023-02-02 | End: 2023-02-02 | Stop reason: HOSPADM

## 2023-02-02 RX ORDER — MIDAZOLAM HYDROCHLORIDE 1 MG/ML
5-10 INJECTION, SOLUTION INTRAMUSCULAR; INTRAVENOUS
Status: DISCONTINUED | OUTPATIENT
Start: 2023-02-02 | End: 2023-02-02 | Stop reason: HOSPADM

## 2023-02-02 RX ORDER — SODIUM CHLORIDE 9 MG/ML
50 INJECTION, SOLUTION INTRAVENOUS CONTINUOUS
Status: DISCONTINUED | OUTPATIENT
Start: 2023-02-02 | End: 2023-02-02 | Stop reason: HOSPADM

## 2023-02-02 RX ORDER — EPINEPHRINE 0.1 MG/ML
1 INJECTION INTRACARDIAC; INTRAVENOUS
Status: DISCONTINUED | OUTPATIENT
Start: 2023-02-02 | End: 2023-02-02 | Stop reason: HOSPADM

## 2023-02-02 RX ORDER — PROPOFOL 10 MG/ML
INJECTION, EMULSION INTRAVENOUS AS NEEDED
Status: DISCONTINUED | OUTPATIENT
Start: 2023-02-02 | End: 2023-02-02 | Stop reason: HOSPADM

## 2023-02-02 RX ORDER — SODIUM CHLORIDE 0.9 % (FLUSH) 0.9 %
5-40 SYRINGE (ML) INJECTION EVERY 8 HOURS
Status: DISCONTINUED | OUTPATIENT
Start: 2023-02-02 | End: 2023-02-02 | Stop reason: HOSPADM

## 2023-02-02 RX ORDER — SODIUM CHLORIDE 9 MG/ML
INJECTION, SOLUTION INTRAVENOUS
Status: DISCONTINUED | OUTPATIENT
Start: 2023-02-02 | End: 2023-02-02 | Stop reason: HOSPADM

## 2023-02-02 RX ORDER — SODIUM CHLORIDE 0.9 % (FLUSH) 0.9 %
5-40 SYRINGE (ML) INJECTION AS NEEDED
Status: DISCONTINUED | OUTPATIENT
Start: 2023-02-02 | End: 2023-02-02 | Stop reason: HOSPADM

## 2023-02-02 RX ADMIN — PROPOFOL 30 MG: 10 INJECTION, EMULSION INTRAVENOUS at 15:58

## 2023-02-02 RX ADMIN — LIDOCAINE HYDROCHLORIDE 40 MG: 20 INJECTION, SOLUTION EPIDURAL; INFILTRATION; INTRACAUDAL; PERINEURAL at 15:52

## 2023-02-02 RX ADMIN — SODIUM CHLORIDE: 900 INJECTION, SOLUTION INTRAVENOUS at 15:47

## 2023-02-02 RX ADMIN — PROPOFOL 30 MG: 10 INJECTION, EMULSION INTRAVENOUS at 15:53

## 2023-02-02 RX ADMIN — PROPOFOL 70 MG: 10 INJECTION, EMULSION INTRAVENOUS at 15:52

## 2023-02-02 NOTE — H&P
[address] [facility], MD, FACP, [place], [place]      EDI Silva, [facility]-BC   Steve Miranda, [facility]   Nga Fink, FNP-SONNY López FNP-C   Lisa Cordon, [facility]      [address]   at [address], [address] [place] Melanie Feldman  22.   [phone]   FAX: [address]   at [address], [address] [place] [address], [address] [place] [address] - [address]   [phone]   FAX: [phone]         PRE-PROCEDURE NOTE - EGD    H and P from last office visit reviewed. Allergies reviewed. Out-patient medicaton list reviewed. Patient Active Problem List   Diagnosis Code    Chronic ITP (idiopathic thrombocytopenia) (HCC) D69.3    Cirrhosis of liver without ascites (HCC) K74.60    Herpes B00.9    Portal hypertension (HCC) K76.6    History of esophageal varices with bleeding Z87.19    Autoimmune hepatitis (HCC) K75.4       Allergies   Allergen Reactions    Aspirin Anaphylaxis    Mushroom Hives       No current facility-administered medications on file prior to encounter. Current Outpatient Medications on File Prior to Encounter   Medication Sig Dispense Refill    ferrous sulfate 325 mg (65 mg iron) tablet Take  by mouth. Taking one tab twice a week      cetirizine (ZYRTEC) 10 mg tablet Take 5 mg by mouth. Taking half of one tab daily      cholecalciferol (VITAMIN D3) 25 mcg (1,000 unit) cap Take 1,000 Units by mouth daily. valACYclovir (VALTREX) 500 mg tablet Take 500 mg by mouth every other day. multivitamin (ONE A DAY) tablet Take 1 tablet by mouth daily. nadolol (CORGARD) 20 mg tablet Take  by mouth daily. predniSONE (DELTASONE) 1 mg tablet Take 5 mg by mouth daily. bumetanide (BUMEX) 1 mg tablet Take 1 mg by mouth daily.       zoledronic acid (RECLAST) 5 mg/100 mL pgbk 5 mg by IntraVENous route once. PRN (Patient not taking: Reported on 2/2/2023)         For EGD to assess for esophageal and gastric varices. Plan to perform banding if indicated based upon variceal size and appearance. The risks of the procedure were discussed with the patient. These included reaction to anesthesia, pain, perforation and bleeding. All questions were answered. The patient wishes to proceed with the procedure. PHYSICAL EXAMINATION:  Visit Vitals  /60 Comment: left arm   Pulse 60   Temp 98 °F (36.7 °C)   Resp 20   SpO2 97%       General: No acute distress. Eyes: Sclera anicteric. ENT: No oral lesions. Thyroid normal.  Nodes: No adenopathy. Skin: No spider angiomata. No jaundice. No palmar erythema. Respiratory: Lungs clear to auscultation. Cardiovascular: Regular heart rate. No murmurs. No JVD. Abdomen: Soft non-tender, liver size normal to percussion/palpation. Spleen not palpable. No obvious ascites. Extremities: No edema. No muscle wasting. No gross arthritic changes. Neurologic: Alert and oriented. Cranial nerves grossly intact. No asterixis. MOST RECENT LABORATORY STUDIES:  Lab Results   Component Value Date/Time    WBC 5.8 01/31/2023 01:35 PM    HGB 14.5 01/31/2023 01:35 PM    HCT 40.7 01/31/2023 01:35 PM    PLATELET CANCELED 70/58/8251 01:35 PM    MCV 92 01/31/2023 01:35 PM     Lab Results   Component Value Date/Time    INR 1.1 01/31/2023 01:35 PM    Prothrombin time 11.9 01/31/2023 01:35 PM       ASSESSMENT AND PLAN:  EGD to assess for esophageal and/or gastric varices.   Sedation per anesthesiology      MD Desi Barrett [address] [place], [place]   Melanie Jasmine  22. [phone]  FAX:  [address]

## 2023-02-02 NOTE — ANESTHESIA POSTPROCEDURE EVALUATION
Procedure(s):  ESOPHAGOGASTRODUODENOSCOPY (EGD). MAC    Anesthesia Post Evaluation        Patient participation: complete - patient participated  Level of consciousness: awake  Pain management: adequate  Airway patency: patent  Anesthetic complications: no  Cardiovascular status: hemodynamically stable  Respiratory status: acceptable  Hydration status: acceptable  Comments: The patient is ready for PACU discharge. Trinity Correa DO                   Post anesthesia nausea and vomiting:  controlled      INITIAL Post-op Vital signs:   Vitals Value Taken Time   /70 02/02/23 1606   Temp     Pulse 64 02/02/23 1609   Resp 18 02/02/23 1609   SpO2 96 % 02/02/23 1609   Vitals shown include unvalidated device data.

## 2023-02-02 NOTE — PROCEDURES
[address] [facility], MD, FACP, Effie Angel Song [place]f, PA-C    Angelique Eaton, AGPCNP-BC   Marni Vidal, [facility]-   Alia Ortiz, FNP-C  Asmita Gomez, FNP-C   Sandra Vega, AGPCNP-BC      [address]   at [address], [address], [place]  [place].   [phone]   FAX: [phone]   at [address], [address] [place] [address] - [address]   [phone]   FAX: [phone]         UPPER ENDOSCOPY PROCEDURE NOTE    Janie Fay  1950    INDICATION: Cirrhosis. Screening for esophageal varices with variceal ligation if  indicated. : Marge Gatica MD    SURGICAL ASSISTANT:  None    PROSTHETIC DEVISES, TISSUE GRAFTS, ORGAN TRANSPLANTS:  Not applicable     ANESTHESIA/SEDATION: MAC Sedation per anesthesiology      PROCEDURE DESCRIPTION:  [employer] consent was obtained from the patient for the procedure. All risks and benefits of the procedure explained. The procedure was performed in the endoscopy suite. The patient was laying on a stretcher and moved to the left lateral decubitus position prior to administration of sedation. Sedation was administered by anesthesiology. See their note for details. The endoscope was inserted into the mouth and advanced under direct vision to the second portion of the duodenum. Careful inspection of upper gastrointestinal tract was made as the endoscope was inserted and withdrawn. Retroflexion of the endoscope to view of the cardia of the stomach was performed. The findings and interventions are described below. FINDINGS:  Esophagus:    Small esophageal varices. No banding performed. Stomach:   Normal  No gastric varices identified.     Duodenum:   Normal bulb and second portion    SPECIMENS COLLECTED: None    INTERVENTIONS:  None    COMPLICATIONS: None. The patient tolerated the procedure well. EBL: Negligible. RECOMMENDATIONS:  Observe until discharge parameters are achieved. May resume previous diet. Repeat endoscopy to reassess varices and need for banding in 6 months. Follow-up Liver [place] [facility] office as scheduled.       Sintia Phipps MD  [address]  [address]  [place] J[place]  22. [phone]  FAX:  [address] [place] [address]

## 2023-02-02 NOTE — PROGRESS NOTES

## 2023-02-02 NOTE — DISCHARGE INSTRUCTIONS
[address] [facility], MD, FACP, [place] Rac[place]learlene Robertson, [place]      EDI Ortiz S Angelito, [facility] Center[facility]-BC   Nivia Johnson, [facility]-   Arnie Simpson, LILIBETH Waters Se FNROMINA Dunaway, PCNP-BC      [address]   at [address], [address], [place]  [address].   [phone]   FAX: [phone]   at [address], [address] [place] [address], [address] [place] [address] Street [address]   [phone]   FAX: [phone]         ENDOSCOPY DISCHARGE INSTRUCTIONS      Crystal Mckenzie  1950  Date: 2/2/2023    DISCOMFORT:  Use lozenges or warm salt water gargle for sore thoat  Apply warm compress to IV site if red. If redness or soreness persists call the office. You may experience gas and bloating. Walking and belching will help relieve this. You may experience chest discomfort or pressure especially if banding of esophageal varices was performed. DIET:  You may resume your normal diet. ACTIVITY:  Spend the remainder of the day resting. Avoid any strenuous activity. You may not operate a vehicle for 12 hours. You may not engage in an occupation involving machinery or appliances for rest of today. Avoid making any critical or financial decisions for at least 24 hour. Call the [address] [place] [address] [facility] if you have any of the following:  Increasing chest or abdominal pain, nausea, vomiting, vomiting blood, abdominal distension or swelling, fever or chills, bloody discharge from nose or mouth or shortness of breath. Follow-up Instructions:  Call Dr. Puja Oneill for any questions or problems at the phone number listed above. If a biopsy was performed, you will be contacted by the office staff or Dr Puja Oneill within 1 week.    If you have not heard from us by then you may call the office at the phone number listed above to inquire about the results. ENDOSCOPY FINDINGS:  Your endoscopy demonstrated small esophageal varices. Will repeat EGD to look for development of varices in 6 months  Keep follow-up appointment with Campbell Hogue on 4/27/2023. DISCHARGE SUMMARY from the Nurse:   The following personal items collected during your admission are returned to you:   Dental Appliance: Dental Appliances: None  Vision: Visual Aid: None  Hearing Aid:    Jewelry:    Clothing:    Other Valuables:    Valuables sent to safe:

## 2023-02-06 ENCOUNTER — HOSPITAL ENCOUNTER (OUTPATIENT)
Dept: ULTRASOUND IMAGING | Age: 73
Discharge: HOME | End: 2023-02-06
Attending: NURSE PRACTITIONER
Payer: MEDICARE

## 2023-02-06 DIAGNOSIS — K74.60 CIRRHOSIS OF LIVER WITHOUT ASCITES, UNSPECIFIED HEPATIC CIRRHOSIS TYPE (HCC): ICD-10-CM

## 2023-02-06 PROCEDURE — 76700 US EXAM ABDOM COMPLETE: CPT

## 2023-04-27 ENCOUNTER — OFFICE VISIT (OUTPATIENT)
Dept: HEMATOLOGY | Age: 73
End: 2023-04-27
Payer: MEDICARE

## 2023-04-27 VITALS
BODY MASS INDEX: 23.99 KG/M2 | SYSTOLIC BLOOD PRESSURE: 106 MMHG | WEIGHT: 144 LBS | HEART RATE: 72 BPM | DIASTOLIC BLOOD PRESSURE: 58 MMHG | TEMPERATURE: 97 F | OXYGEN SATURATION: 94 % | HEIGHT: 65 IN

## 2023-04-27 DIAGNOSIS — K74.60 CIRRHOSIS OF LIVER WITH ASCITES, UNSPECIFIED HEPATIC CIRRHOSIS TYPE (HCC): Primary | ICD-10-CM

## 2023-04-27 DIAGNOSIS — R18.8 CIRRHOSIS OF LIVER WITH ASCITES, UNSPECIFIED HEPATIC CIRRHOSIS TYPE (HCC): Primary | ICD-10-CM

## 2023-04-27 PROCEDURE — G8432 DEP SCR NOT DOC, RNG: HCPCS | Performed by: NURSE PRACTITIONER

## 2023-04-27 PROCEDURE — 3017F COLORECTAL CA SCREEN DOC REV: CPT | Performed by: NURSE PRACTITIONER

## 2023-04-27 PROCEDURE — 1123F ACP DISCUSS/DSCN MKR DOCD: CPT | Performed by: NURSE PRACTITIONER

## 2023-04-27 PROCEDURE — 99214 OFFICE O/P EST MOD 30 MIN: CPT | Performed by: NURSE PRACTITIONER

## 2023-04-27 PROCEDURE — G8536 NO DOC ELDER MAL SCRN: HCPCS | Performed by: NURSE PRACTITIONER

## 2023-04-27 PROCEDURE — 1090F PRES/ABSN URINE INCON ASSESS: CPT | Performed by: NURSE PRACTITIONER

## 2023-04-27 PROCEDURE — G8400 PT W/DXA NO RESULTS DOC: HCPCS | Performed by: NURSE PRACTITIONER

## 2023-04-27 PROCEDURE — G0463 HOSPITAL OUTPT CLINIC VISIT: HCPCS | Performed by: NURSE PRACTITIONER

## 2023-04-27 PROCEDURE — G8420 CALC BMI NORM PARAMETERS: HCPCS | Performed by: NURSE PRACTITIONER

## 2023-04-27 PROCEDURE — 1101F PT FALLS ASSESS-DOCD LE1/YR: CPT | Performed by: NURSE PRACTITIONER

## 2023-04-27 PROCEDURE — 91200 LIVER ELASTOGRAPHY: CPT | Performed by: NURSE PRACTITIONER

## 2023-04-27 PROCEDURE — G8427 DOCREV CUR MEDS BY ELIG CLIN: HCPCS | Performed by: NURSE PRACTITIONER

## 2023-04-27 RX ORDER — FUROSEMIDE 40 MG/1
40 TABLET ORAL DAILY
Qty: 90 TABLET | Refills: 3 | Status: SHIPPED | OUTPATIENT
Start: 2023-04-27

## 2023-04-27 RX ORDER — SPIRONOLACTONE 100 MG/1
100 TABLET, FILM COATED ORAL DAILY
Qty: 90 TABLET | Refills: 3 | Status: SHIPPED | OUTPATIENT
Start: 2023-04-27

## 2023-04-27 NOTE — PROGRESS NOTES
[facility], MD, FACP, Effie Robertson, [place]      EDI Ly, [facility]-BC   Ravi Gutierrez, [facility]-AG   Cody Valenzuela FNP-C  Jonn Medeiros FNP-C   Zoltan Chase, [facility]-BC      [address]   at [address], [facility] Melanie Sanchez  22.   [phone]   FAX: [phone]  Liver [facility]   at [address], [address] [place] [address], [address] [place] [address] Street [address]   [phone]   FAX: [phone]       Patient Care Team:  Philip Leigh MD as PCP - General (Family Medicine)  Aleksandra Dominguez MD (Cardiovascular Disease Physician)      Problem List  Date Reviewed: 2/21/2023            Codes Class Noted    Autoimmune hepatitis [facility]) ICD-10-CM: K75.4  ICD-9-CM: 571.42  2/1/2023        Chronic ITP (idiopathic thrombocytopenia) ([facility] ICD-10-CM: D69.3  ICD-9-CM: 287.31  1/31/2023        Cirrhosis of liver without ascites ([facility] ICD-10-CM: K74.60  ICD-9-CM: 571.5  1/31/2023        Herpes ICD-10-CM: B00.9  ICD-9-CM: 054.9  1/31/2023        Portal hypertension ([facility].) ICD-10-CM: K76.6  ICD-9-CM: 572.3  1/31/2023        History of esophageal varices with bleeding ICD-10-CM: Z87.19  ICD-9-CM: V12.79  1/31/2023             Alda Gitelman is being seen at [address] for 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 The active problem list, all pertinent past medical history, medications, liver histology, endoscopic studies, radiologic findings   and laboratory findings related to the liver disorder were reviewed and discussed with the patient. The patient is a 74 y.o.  female who was found to have abnormalities in liver transaminases and alkaline phosphatase in 1989.      She was seen by Dr Noam Cotto at [facility] in the early 90's, transferred to Dr. Karishma Venegas who retired in 2020, has not been seen by Hepatology since 5/2020. AIGUCCI has been in biochemical remission for 20+ years. She has cirrhosis, Child A and stable for 20+ years. The low dose of prednisone is for ITP. Serologic evaluation for markers of chronic liver disease are not available. The most recent imaging of the liver was Ultrasound performed in 5/2022. Results suggest cirrhosis. No liver mass lesions noted. A liver biopsy was performed many years ago. The results are not available. In the office today the patient has the following symptoms:  fatigue,   swelling of the abdomen,   swelling of the lower extremities,     The patient is not experiencing the following symptoms which are commonly seen with this liver disorder:   pain in the right side over the liver,   problems concentrating,   hematemesis,   hematochezia. The patient completes all daily activities without any functional limitations. ASSESSMENT AND PLAN:  Cirrhosis  The diagnosis of cirrhosis is based upon imaging, laboratory studies, complications of cirrhosis. Cirrhosis is secondary to Autoimmune hepatitis. The patient has normal liver function. The patient has never developed any complications of cirrhosis to date. The CTP is 6. Child class A. The MELD score is 9. Autoimmune Hepatitis   The diagnosis was based upon laboratory studies, serology, imaging, liver biopsy  All of these were documented at [facility] many years ago and are no longer available. The patient is not currently being treated with prednisone 1 mg every day. Have performed  laboratory testing to monitor liver function and degree of liver injury. This will include BMP, hepatic panel, CBC with platelet count, INR. Liver transaminases are elevated. ALP is elevated. Total bilirubin is elevated. Serum albumin is normal.  INR is normal.  The platelet count is depressed.        The liver enzymes are only mildly elevated and there are no plans at this time to recycle steroids or increase immune suppression given the risk of infection at her age. FibroScan performed at [address] today had an [place] was 43.8. IQR/med 10%. . The results suggested a fibrosis level of F4. The CAP score suggests there is hepatic steatosis. Since the fibroscan showed some fat in the liver, this may be the etiology for the elevation in liver enzymes. Will perform serologic and virologic studies to assess for other causes of chronic liver disease. Will perform imaging of the liver with ultrasound. Ascites   Ascites developed for the first time in 2/2023. PCP started her on bumex 1 mg every day because of abdominal swelling and SOB  Ascites has not resolved with current dose of diuretics. Will change to lasix 40 mg and add spironolactone 100 mg every day. The patient was counseled regarding the need to maintain sodium restriction and the types of foods containing high amounts of sodium to be avoided. It is unusual to develop ascites with a normal albumin. Will get ECHO to check for RV dysfunction and TR. SHANNA done in 2/14/2023 and Cardiology says Mitral valve was leaking. Done at [facility], by Dr. Wylie Lesch. Will request results. Lower extremity edema  Edema has resolved with current dose of diuretics. Will change to lasix 40 mg and add spironolactone 100 mg Qday. Screening for Esophageal varices   The patient has had esophageal varices without prior bleeding. The most recent EGD was performed by [facility] in 2/2023. Small esophageal varices were present, no banding. Will schedule for EGD to assess for varices and need for additional banding in 6 months per [facility]. The patient is on a beta-blocker to reduce the risk of variceal hemorrhage. Hepatic encephalopathy   Overt HE has not developed to date.     There is no reason for treatment with lactulose of xifaxan    Thrombocytopenia This is secondary to cirrhosis and possibly ITP. There is no evidence of overt bleeding. She is currently on prednisone 5mg every day  The platelet count is under 50K. The use of a platelet growth factor to raise the platelet count and avoid platelet transfusions would be indicated if the patient requires a medical or surgical procedure. Screening for Hepatocellular Carcinoma  [address] screening was performed in 1/2023 and does not suggest [address] AFP was ordered today and ultrasound will be scheduled. AFP was normal.  The repeat US demonstrated no liver mass lesion. The next [address] will be performed in 8/2023    Treatment of other medical problems in patients with chronic liver disease  There are no contraindications for the patient to take most medications that are necessary for treatment of other medical issues. The patient has cirrhrosis and should avoid taking NSAIDs which are associated with a higher rate of developing MARICRUZ. The patient can take any medications utilized for treatment of DM, statins to treat hypercholesterolemia    Normal doses of acetaminophen, as recommended on the label of the bottle, are not hepatotoxic except in the setting of daily alcohol use, even in patients with cirrhosis and can be utilized for pain. Counseling for alcohol in patients with chronic liver disease  The patient was counseled regarding alcohol consumption and the effect of alcohol on chronic liver disease. The patient does not consume any significant amount of alcohol. Osteoporosis  The risk of osteoporosis is increased in patients with cirrhosis. The patient has been treated with reclast.      Treatment of other medical problems in patients with chronic liver disease  There are no contraindications for the patient to take most medications that are necessary for treatment of other medical issues.     The patient has cirrhrosis and should avoid taking NSAIDs which are associated with a higher rate of developing MARICRUZ. The patient can take any medications utilized for treatment of DM, statins to treat hypercholesterolemia  Normal doses of acetaminophen, as recommended on the label of the bottle, are not hepatotoxic except in the setting of daily alcohol use, even in patients with cirrhosis and can be utilized for pain. Counseling for alcohol in patients with chronic liver disease  The patient does not consume any significant amount of alcohol. Vaccinations   Vaccination for viral hepatitis B is not needed. The patient has serologic evidence of prior exposure or vaccination with immunity. The patient has received 2 doses of COVID-19 vaccine. Routine vaccinations against other bacterial and viral agents can be performed as indicated. Annual flu vaccination should be administered if indicated. ALLERGIES  Allergies   Allergen Reactions    Aspirin Anaphylaxis    Mushroom Hives       MEDICATIONS  Current Outpatient Medications   Medication Sig    spironolactone (ALDACTONE) 100 mg tablet Take 1 Tablet by mouth daily. Indications: accumulation of fluid caused by cirrhosis of the liver    furosemide (LASIX) 40 mg tablet Take 1 Tablet by mouth daily. Indications: accumulation of fluid caused by cirrhosis of the liver    ferrous sulfate 325 mg (65 mg iron) tablet Take  by mouth. Taking one tab twice a week    cetirizine (ZYRTEC) 10 mg tablet Take 5 mg by mouth. Taking half of one tab daily    cholecalciferol (VITAMIN D3) 25 mcg (1,000 unit) cap Take 1,000 Units by mouth daily. valACYclovir (VALTREX) 500 mg tablet Take 500 mg by mouth every other day. multivitamin (ONE A DAY) tablet Take 1 tablet by mouth daily. nadolol (CORGARD) 20 mg tablet Take  by mouth daily. predniSONE (DELTASONE) 1 mg tablet Take 5 mg by mouth daily. No current facility-administered medications for this visit.        SYSTEM REVIEW NOT RELATED TO LIVER DISEASE OR REVIEWED ABOVE:  Constitution systems: Negative for fever, chills, weight gain, weight loss. Eyes: Negative for visual changes. ENT: Negative for sore throat, painful swallowing. Respiratory: Negative for cough, hemoptysis, SOB. Cardiology: Negative for chest pain, palpitations. GI:  Negative for constipation or diarrhea. : Negative for urinary frequency, dysuria, hematuria, nocturia. Skin: Negative for rash. Hematology: Negative for easy bruising, blood clots. Musculo-skelatal: Negative for back pain, muscle pain, weakness. Neurologic: Negative for headaches, dizziness, vertigo, memory problems not related to HE. Psychology: Negative for anxiety, depression. FAMILY HISTORY:  The father   of COPD. The mother  of cancer of the spine. There is no family history of liver disease. There is no family history of immune disorders. [facility] mother    SOCIAL HISTORY:  The patient is . The patient has 1 child. and 3 grandchildren. The patient has never used tobacco products. The patient has never consumed significant amounts of alcohol. The patient retired in  she was a  for 40 years. PHYSICAL EXAMINATION:  Visit Vitals  BP (!) 106/58 (BP 1 Location: Left upper arm, BP Patient Position: Sitting, BP Cuff Size: Adult)   Pulse 72   Temp 97 °F (36.1 °C) (Temporal)   Ht 5' 5\" (1.651 m)   Wt 144 lb (65.3 kg)   SpO2 94%   BMI 23.96 kg/m²       General: No acute distress. Eyes: Sclera anicteric. ENT: No oral lesions. Thyroid normal.  Nodes: No adenopathy. Skin: No spider angiomata. No jaundice. No palmar erythema. Respiratory: Lungs clear to auscultation. Cardiovascular: Regular heart rate. Murmur noted. No JVD. Abdomen: Distended. Liver size normal to percussion/palpation. Spleen not palpable. May have ascites. Extremities: No edema. No muscle wasting. No gross arthritic changes. Neurologic: Alert and oriented. Cranial nerves grossly intact.   No asterixis. LABORATORY STUDIES:  From 12/2022 :  AST/ALT/ALP/T Bili/ALB:  50/34/137/1.1/3.7  WBC/HB/PLT/INR:  5.7/14.4/78//na  NA/BUN/CREAT:145/9/0.63    Liver [place] of [address] Units 4/27/2023 1/31/2023   WBC 3.6 - 11.0 K/uL 5.0 5.8   ANC 1.8 - 8.0 K/UL 3.5 4.1   HGB 11.5 - 16.0 g/dL 13.8 14.5    - 400 K/uL 43 (LL) CANCELED   INR 0.9 - 1.1   1.2 (H) 1.1   AST 15 - 37 U/L 60 (H) 61 (H)   ALT 12 - 78 U/L 42 34 (H)   Alk Phos 45 - 117 U/L 159 (H) 125 (H)   Bili, Total 0.2 - 1.0 MG/DL 1.1 (H) 1.5 (H)   Bili, Direct 0.0 - 0.2 MG/DL 0.4 (H) 0.50 (H)   Albumin 3.5 - 5.0 g/dL 3.1 (L) 3.7   BUN 6 - 20 MG/DL 11 10   Creat 0.55 - 1.02 MG/DL 0.63 0.58   Na 136 - 145 mmol/L 139 141   K 3.5 - 5.1 mmol/L 3.4 (L) 3.2 (L)   Cl 97 - 108 mmol/L 104 100   CO2 21 - 32 mmol/L 31 27   Glucose 65 - 100 mg/dL 70 79     Cancer Screening Latest Ref Rng & Units 1/31/2023   AFP, Serum 0.0 - 9.2 ng/mL 2.7   AFP-L3% 0.0 - 9.9 % Comment       SEROLOGIES:  3/2006: Hep B Surface Ab positive    LIVER HISTOLOGY:  4/2023. FibroScan performed at [address]. EkPa was 43.8. IQR/med 10%. . The results suggested a fibrosis level of F4. The CAP score suggests there is hepatic steatosis. ENDOSCOPIC PROCEDURES:  Not available or performed    RADIOLOGY:  5/2020. Ultrasound of liver. Echogenic consistent with cirrhosis. No liver mass lesions. No dilated bile ducts. 2/2023. Ultrasound of liver. Echogenic consistent with cirrhosis. No liver mass lesions. No dilated bile ducts. Mild ascites. OTHER TESTING:  Not available or performed    FOLLOW-UP:  All of the issues listed above in the Assessment and Plan were discussed with the patient. All questions were answered. The patient expressed a clear understanding of the above. [address] [address] in 4 weeks for Fibroscan to review data and develop treatment plan.     Wiliam Mcginnis, LESLYP-C  Liver [place] of [address], [address] [facility], Mel[place]  22.  [phone]  [address] [facility]

## 2023-04-27 NOTE — PROGRESS NOTES
Identified pt with two pt identifiers(name and ). Reviewed record in preparation for visit and have obtained necessary documentation. Chief Complaint   Patient presents with    Follow-up      Vitals:    23 1348   BP: (!) 106/58   Pulse: 72   Temp: 97 °F (36.1 °C)   TempSrc: Temporal   SpO2: 94%   Weight: 144 lb (65.3 kg)   Height: 5' 5\" (1.651 m)   PainSc:   0 - No pain       Health Maintenance Review: Patient reminded of \"due or due soon\" health maintenance. I have asked the patient to contact his/her primary care provider (PCP) for follow-up on his/her health maintenance. Coordination of Care Questionnaire:  :   1) Have you been to an emergency room, urgent care, or hospitalized since your last visit? If yes, where when, and reason for visit? no       2. Have seen or consulted any other health care provider since your last visit? If yes, where when, and reason for visit? NO      Patient is accompanied by self I have received verbal consent from Pete Lopez to discuss any/all medical information while they are present in the room.

## 2023-04-28 ENCOUNTER — TELEPHONE (OUTPATIENT)
Dept: HEMATOLOGY | Age: 73
End: 2023-04-28

## 2023-04-28 ENCOUNTER — DOCUMENTATION ONLY (OUTPATIENT)
Dept: HEMATOLOGY | Age: 73
End: 2023-04-28

## 2023-04-28 LAB
ALBUMIN SERPL-MCNC: 3.1 G/DL (ref 3.5–5)
ALBUMIN/GLOB SERPL: 0.7 (ref 1.1–2.2)
ALP SERPL-CCNC: 159 U/L (ref 45–117)
ALT SERPL-CCNC: 42 U/L (ref 12–78)
ANION GAP SERPL CALC-SCNC: 4 MMOL/L (ref 5–15)
AST SERPL-CCNC: 60 U/L (ref 15–37)
BASOPHILS # BLD: 0 K/UL (ref 0–0.1)
BASOPHILS NFR BLD: 0 % (ref 0–1)
BILIRUB DIRECT SERPL-MCNC: 0.4 MG/DL (ref 0–0.2)
BILIRUB SERPL-MCNC: 1.1 MG/DL (ref 0.2–1)
BUN SERPL-MCNC: 11 MG/DL (ref 6–20)
BUN/CREAT SERPL: 17 (ref 12–20)
CALCIUM SERPL-MCNC: 9.1 MG/DL (ref 8.5–10.1)
CHLORIDE SERPL-SCNC: 104 MMOL/L (ref 97–108)
CO2 SERPL-SCNC: 31 MMOL/L (ref 21–32)
CREAT SERPL-MCNC: 0.63 MG/DL (ref 0.55–1.02)
DIFFERENTIAL METHOD BLD: ABNORMAL
EOSINOPHIL # BLD: 0 K/UL (ref 0–0.4)
EOSINOPHIL NFR BLD: 0 % (ref 0–7)
ERYTHROCYTE [DISTWIDTH] IN BLOOD BY AUTOMATED COUNT: 15 % (ref 11.5–14.5)
GLOBULIN SER CALC-MCNC: 4.4 G/DL (ref 2–4)
GLUCOSE SERPL-MCNC: 70 MG/DL (ref 65–100)
HCT VFR BLD AUTO: 43.6 % (ref 35–47)
HGB BLD-MCNC: 13.8 G/DL (ref 11.5–16)
IMM GRANULOCYTES # BLD AUTO: 0 K/UL
IMM GRANULOCYTES NFR BLD AUTO: 0 %
INR PPP: 1.2 (ref 0.9–1.1)
LYMPHOCYTES # BLD: 1.2 K/UL (ref 0.8–3.5)
LYMPHOCYTES NFR BLD: 23 % (ref 12–49)
MCH RBC QN AUTO: 31.4 PG (ref 26–34)
MCHC RBC AUTO-ENTMCNC: 31.7 G/DL (ref 30–36.5)
MCV RBC AUTO: 99.1 FL (ref 80–99)
MONOCYTES # BLD: 0.3 K/UL (ref 0–1)
MONOCYTES NFR BLD: 6 % (ref 5–13)
NEUTS SEG # BLD: 3.5 K/UL (ref 1.8–8)
NEUTS SEG NFR BLD: 71 % (ref 32–75)
NRBC # BLD: 0.02 K/UL (ref 0–0.01)
NRBC BLD-RTO: 0.4 PER 100 WBC
PATH REV BLD -IMP: ABNORMAL
PLATELET # BLD AUTO: 43 K/UL (ref 150–400)
PLATELET COMMENTS,PCOM: ABNORMAL
POTASSIUM SERPL-SCNC: 3.4 MMOL/L (ref 3.5–5.1)
PROT SERPL-MCNC: 7.5 G/DL (ref 6.4–8.2)
PROTHROMBIN TIME: 12.4 SEC (ref 9–11.1)
RBC # BLD AUTO: 4.4 M/UL (ref 3.8–5.2)
RBC MORPH BLD: ABNORMAL
SODIUM SERPL-SCNC: 139 MMOL/L (ref 136–145)
WBC # BLD AUTO: 5 K/UL (ref 3.6–11)
WBC MORPH BLD: ABNORMAL

## 2023-04-28 NOTE — PROGRESS NOTES
Echo report from [facility] received and placed in provider's box for review.

## 2023-05-01 ENCOUNTER — HOSPITAL ENCOUNTER (INPATIENT)
Facility: HOSPITAL | Age: 73
LOS: 6 days | Discharge: HOME | DRG: 187 | End: 2023-05-07
Attending: INTERNAL MEDICINE | Admitting: INTERNAL MEDICINE
Payer: MEDICARE

## 2023-05-01 ENCOUNTER — APPOINTMENT (OUTPATIENT)
Dept: GENERAL RADIOLOGY | Age: 73
End: 2023-05-01
Attending: EMERGENCY MEDICINE
Payer: MEDICARE

## 2023-05-01 ENCOUNTER — HOSPITAL ENCOUNTER (INPATIENT)
Age: 73
LOS: 4 days | Discharge: HOME | End: 2023-05-06
Attending: STUDENT IN AN ORGANIZED HEALTH CARE EDUCATION/TRAINING PROGRAM | Admitting: INTERNAL MEDICINE
Payer: MEDICARE

## 2023-05-01 ENCOUNTER — APPOINTMENT (OUTPATIENT)
Dept: CT IMAGING | Age: 73
End: 2023-05-01
Attending: STUDENT IN AN ORGANIZED HEALTH CARE EDUCATION/TRAINING PROGRAM
Payer: MEDICARE

## 2023-05-01 DIAGNOSIS — R79.89 ELEVATED BRAIN NATRIURETIC PEPTIDE (BNP) LEVEL: ICD-10-CM

## 2023-05-01 DIAGNOSIS — D69.6 THROMBOCYTOPENIA (HCC): ICD-10-CM

## 2023-05-01 DIAGNOSIS — K74.60 CIRRHOSIS OF LIVER WITHOUT ASCITES, UNSPECIFIED HEPATIC CIRRHOSIS TYPE (HCC): ICD-10-CM

## 2023-05-01 DIAGNOSIS — R06.00 DYSPNEA, UNSPECIFIED TYPE: ICD-10-CM

## 2023-05-01 DIAGNOSIS — J90 PLEURAL EFFUSION: Primary | ICD-10-CM

## 2023-05-01 LAB
ANION GAP SERPL CALC-SCNC: 2 MMOL/L (ref 5–15)
ARTERIAL PATENCY WRIST A: YES
BASE EXCESS BLDA CALC-SCNC: 2.7 MMOL/L
BASOPHILS # BLD: 0 K/UL (ref 0–0.1)
BASOPHILS NFR BLD: 0 % (ref 0–1)
BDY SITE: ABNORMAL
BNP SERPL-MCNC: 196 PG/ML
BUN SERPL-MCNC: 14 MG/DL (ref 6–20)
BUN/CREAT SERPL: 16 (ref 12–20)
CALCIUM SERPL-MCNC: 9.1 MG/DL (ref 8.5–10.1)
CHLORIDE SERPL-SCNC: 108 MMOL/L (ref 97–108)
CO2 SERPL-SCNC: 30 MMOL/L (ref 21–32)
COMMENT, HOLDF: NORMAL
CREAT SERPL-MCNC: 0.87 MG/DL (ref 0.55–1.02)
DIFFERENTIAL METHOD BLD: ABNORMAL
EOSINOPHIL # BLD: 0 K/UL (ref 0–0.4)
EOSINOPHIL NFR BLD: 0 % (ref 0–7)
ERYTHROCYTE [DISTWIDTH] IN BLOOD BY AUTOMATED COUNT: 14.9 % (ref 11.5–14.5)
GLUCOSE SERPL-MCNC: 126 MG/DL (ref 65–100)
HCO3 BLDA-SCNC: 25 MMOL/L (ref 22–26)
HCT VFR BLD AUTO: 42.5 % (ref 35–47)
HGB BLD-MCNC: 14.3 G/DL (ref 11.5–16)
IMM GRANULOCYTES # BLD AUTO: 0 K/UL (ref 0–0.04)
IMM GRANULOCYTES NFR BLD AUTO: 0 % (ref 0–0.5)
INR PPP: 1.2 (ref 0.9–1.1)
LACTATE BLD-SCNC: 2.52 MMOL/L (ref 0.4–2)
LYMPHOCYTES # BLD: 1 K/UL (ref 0.8–3.5)
LYMPHOCYTES NFR BLD: 15 % (ref 12–49)
MCH RBC QN AUTO: 32.1 PG (ref 26–34)
MCHC RBC AUTO-ENTMCNC: 33.6 G/DL (ref 30–36.5)
MCV RBC AUTO: 95.5 FL (ref 80–99)
MONOCYTES # BLD: 0.5 K/UL (ref 0–1)
MONOCYTES NFR BLD: 7 % (ref 5–13)
NEUTS SEG # BLD: 5.4 K/UL (ref 1.8–8)
NEUTS SEG NFR BLD: 78 % (ref 32–75)
NRBC # BLD: 0 K/UL (ref 0–0.01)
NRBC BLD-RTO: 0 PER 100 WBC
PCO2 BLDA: 34 MMHG (ref 35–45)
PH BLDA: 7.5 (ref 7.35–7.45)
PLATELET # BLD AUTO: 80 K/UL (ref 150–400)
PLATELET COMMENTS,PCOM: ABNORMAL
PMV BLD AUTO: 10.4 FL (ref 8.9–12.9)
PO2 BLDA: 56 MMHG (ref 80–100)
POTASSIUM SERPL-SCNC: 3.2 MMOL/L (ref 3.5–5.1)
PROTHROMBIN TIME: 12.4 SEC (ref 9–11.1)
RBC # BLD AUTO: 4.45 M/UL (ref 3.8–5.2)
RBC MORPH BLD: ABNORMAL
SAMPLES BEING HELD,HOLD: NORMAL
SAO2 % BLD: 92 % (ref 92–97)
SAO2% DEVICE SAO2% SENSOR NAME: ABNORMAL
SODIUM SERPL-SCNC: 140 MMOL/L (ref 136–145)
SPECIMEN SITE: ABNORMAL
TROPONIN I SERPL HS-MCNC: 28 NG/L (ref 0–51)
WBC # BLD AUTO: 6.9 K/UL (ref 3.6–11)
WBC MORPH BLD: ABNORMAL

## 2023-05-01 PROCEDURE — 96375 TX/PRO/DX INJ NEW DRUG ADDON: CPT

## 2023-05-01 PROCEDURE — 83605 ASSAY OF LACTIC ACID: CPT

## 2023-05-01 PROCEDURE — 74177 CT ABD & PELVIS W/CONTRAST: CPT

## 2023-05-01 PROCEDURE — 74011000636 HC RX REV CODE- 636: Performed by: RADIOLOGY

## 2023-05-01 PROCEDURE — 99285 EMERGENCY DEPT VISIT HI MDM: CPT

## 2023-05-01 PROCEDURE — 82803 BLOOD GASES ANY COMBINATION: CPT

## 2023-05-01 PROCEDURE — 36415 COLL VENOUS BLD VENIPUNCTURE: CPT

## 2023-05-01 PROCEDURE — 71045 X-RAY EXAM CHEST 1 VIEW: CPT

## 2023-05-01 PROCEDURE — 74011250636 HC RX REV CODE- 250/636

## 2023-05-01 PROCEDURE — 71275 CT ANGIOGRAPHY CHEST: CPT

## 2023-05-01 PROCEDURE — 85025 COMPLETE CBC W/AUTO DIFF WBC: CPT

## 2023-05-01 PROCEDURE — 87040 BLOOD CULTURE FOR BACTERIA: CPT

## 2023-05-01 PROCEDURE — 83880 ASSAY OF NATRIURETIC PEPTIDE: CPT

## 2023-05-01 PROCEDURE — 85610 PROTHROMBIN TIME: CPT

## 2023-05-01 PROCEDURE — 77010033678 HC OXYGEN DAILY

## 2023-05-01 PROCEDURE — 84484 ASSAY OF TROPONIN QUANT: CPT

## 2023-05-01 PROCEDURE — 96374 THER/PROPH/DIAG INJ IV PUSH: CPT

## 2023-05-01 PROCEDURE — 80048 BASIC METABOLIC PNL TOTAL CA: CPT

## 2023-05-01 PROCEDURE — 36600 WITHDRAWAL OF ARTERIAL BLOOD: CPT

## 2023-05-01 PROCEDURE — 9990 CHARGE CONVERSION

## 2023-05-01 PROCEDURE — 93005 ELECTROCARDIOGRAM TRACING: CPT

## 2023-05-01 RX ORDER — ONDANSETRON 2 MG/ML
4 INJECTION INTRAMUSCULAR; INTRAVENOUS ONCE
Status: COMPLETED | OUTPATIENT
Start: 2023-05-01 | End: 2023-05-01

## 2023-05-01 RX ORDER — ONDANSETRON 2 MG/ML
INJECTION INTRAMUSCULAR; INTRAVENOUS
Status: COMPLETED
Start: 2023-05-01 | End: 2023-05-01

## 2023-05-01 RX ORDER — ASPIRIN 325 MG
TABLET ORAL
Status: DISPENSED
Start: 2023-05-01 | End: 2023-05-02

## 2023-05-01 RX ADMIN — IOPAMIDOL 100 ML: 755 INJECTION, SOLUTION INTRAVENOUS at 23:29

## 2023-05-01 RX ADMIN — ONDANSETRON 4 MG: 2 INJECTION INTRAMUSCULAR; INTRAVENOUS at 23:08

## 2023-05-02 ENCOUNTER — APPOINTMENT (OUTPATIENT)
Dept: ULTRASOUND IMAGING | Age: 73
End: 2023-05-02
Attending: INTERNAL MEDICINE
Payer: MEDICARE

## 2023-05-02 ENCOUNTER — APPOINTMENT (OUTPATIENT)
Dept: NON INVASIVE DIAGNOSTICS | Age: 73
End: 2023-05-02
Attending: INTERNAL MEDICINE
Payer: MEDICARE

## 2023-05-02 ENCOUNTER — APPOINTMENT (OUTPATIENT)
Dept: GENERAL RADIOLOGY | Age: 73
End: 2023-05-02
Attending: PHYSICIAN ASSISTANT
Payer: MEDICARE

## 2023-05-02 PROBLEM — J90 PLEURAL EFFUSION ON RIGHT: Status: ACTIVE | Noted: 2023-05-02

## 2023-05-02 LAB
ALBUMIN SERPL-MCNC: 2.8 G/DL (ref 3.5–5)
ALBUMIN/GLOB SERPL: 0.6 (ref 1.1–2.2)
ALP SERPL-CCNC: 140 U/L (ref 45–117)
ALT SERPL-CCNC: 44 U/L (ref 12–78)
APPEARANCE FLD: ABNORMAL
AST SERPL-CCNC: 57 U/L (ref 15–37)
BILIRUB DIRECT SERPL-MCNC: 0.9 MG/DL (ref 0–0.2)
BILIRUB SERPL-MCNC: 2 MG/DL (ref 0.2–1)
BODY FLD TYPE: NORMAL
COLOR FLD: YELLOW
COMMENT, HOLDF: NORMAL
GLOBULIN SER CALC-MCNC: 4.6 G/DL (ref 2–4)
LACTATE BLD-SCNC: 1.32 MMOL/L (ref 0.4–2)
LACTATE BLD-SCNC: 2.2 MMOL/L (ref 0.4–2)
LDH FLD L TO P-CCNC: 90 U/L
LYMPHOCYTES NFR FLD: 35 %
MONOS+MACROS NFR FLD: 18 %
NEUTROPHILS NFR FLD: 47 %
NUC CELL # FLD: 366 /CU MM
PH FLD: 6.8
PROT FLD-MCNC: 2.2 G/DL
PROT SERPL-MCNC: 7.4 G/DL (ref 6.4–8.2)
RBC # FLD: >100 /CU MM
SAMPLES BEING HELD,HOLD: NORMAL
SPECIMEN SOURCE FLD: ABNORMAL
SPECIMEN SOURCE FLD: NORMAL
SPECIMEN SOURCE FLD: NORMAL

## 2023-05-02 PROCEDURE — 94761 N-INVAS EAR/PLS OXIMETRY MLT: CPT

## 2023-05-02 PROCEDURE — C1729 CATH, DRAINAGE: HCPCS

## 2023-05-02 PROCEDURE — 88112 CYTOPATH CELL ENHANCE TECH: CPT

## 2023-05-02 PROCEDURE — 74011250637 HC RX REV CODE- 250/637: Performed by: INTERNAL MEDICINE

## 2023-05-02 PROCEDURE — 87070 CULTURE OTHR SPECIMN AEROBIC: CPT

## 2023-05-02 PROCEDURE — 83986 ASSAY PH BODY FLUID NOS: CPT

## 2023-05-02 PROCEDURE — 71045 X-RAY EXAM CHEST 1 VIEW: CPT

## 2023-05-02 PROCEDURE — P9045 ALBUMIN (HUMAN), 5%, 250 ML: HCPCS | Performed by: INTERNAL MEDICINE

## 2023-05-02 PROCEDURE — 32555 ASPIRATE PLEURA W/ IMAGING: CPT

## 2023-05-02 PROCEDURE — 89050 BODY FLUID CELL COUNT: CPT

## 2023-05-02 PROCEDURE — 83615 LACTATE (LD) (LDH) ENZYME: CPT

## 2023-05-02 PROCEDURE — 74011000250 HC RX REV CODE- 250: Performed by: INTERNAL MEDICINE

## 2023-05-02 PROCEDURE — 93306 TTE W/DOPPLER COMPLETE: CPT

## 2023-05-02 PROCEDURE — 83605 ASSAY OF LACTIC ACID: CPT

## 2023-05-02 PROCEDURE — 74011000250 HC RX REV CODE- 250: Performed by: PHYSICIAN ASSISTANT

## 2023-05-02 PROCEDURE — 36415 COLL VENOUS BLD VENIPUNCTURE: CPT

## 2023-05-02 PROCEDURE — 74011250636 HC RX REV CODE- 250/636: Performed by: STUDENT IN AN ORGANIZED HEALTH CARE EDUCATION/TRAINING PROGRAM

## 2023-05-02 PROCEDURE — 9990 CHARGE CONVERSION

## 2023-05-02 PROCEDURE — 74011000250 HC RX REV CODE- 250: Performed by: STUDENT IN AN ORGANIZED HEALTH CARE EDUCATION/TRAINING PROGRAM

## 2023-05-02 PROCEDURE — 0W993ZZ DRAINAGE OF RIGHT PLEURAL CAVITY, PERCUTANEOUS APPROACH: ICD-10-PCS | Performed by: RADIOLOGY

## 2023-05-02 PROCEDURE — P9045 ALBUMIN (HUMAN), 5%, 250 ML: HCPCS

## 2023-05-02 PROCEDURE — 80076 HEPATIC FUNCTION PANEL: CPT

## 2023-05-02 PROCEDURE — 84157 ASSAY OF PROTEIN OTHER: CPT

## 2023-05-02 PROCEDURE — 65270000029 HC RM PRIVATE

## 2023-05-02 PROCEDURE — 74011250636 HC RX REV CODE- 250/636: Performed by: INTERNAL MEDICINE

## 2023-05-02 PROCEDURE — 88305 TISSUE EXAM BY PATHOLOGIST: CPT

## 2023-05-02 PROCEDURE — 87205 SMEAR GRAM STAIN: CPT

## 2023-05-02 PROCEDURE — 74011636637 HC RX REV CODE- 636/637: Performed by: INTERNAL MEDICINE

## 2023-05-02 RX ORDER — PREDNISONE 5 MG/1
5 TABLET ORAL DAILY
Status: DISCONTINUED | OUTPATIENT
Start: 2023-05-02 | End: 2023-05-06 | Stop reason: HOSPADM

## 2023-05-02 RX ORDER — SODIUM CHLORIDE 0.9 % (FLUSH) 0.9 %
5-40 SYRINGE (ML) INJECTION EVERY 8 HOURS
Status: DISCONTINUED | OUTPATIENT
Start: 2023-05-02 | End: 2023-05-06 | Stop reason: HOSPADM

## 2023-05-02 RX ORDER — GUAIFENESIN/DEXTROMETHORPHAN 100-10MG/5
10 SYRUP ORAL
Status: DISCONTINUED | OUTPATIENT
Start: 2023-05-02 | End: 2023-05-06 | Stop reason: HOSPADM

## 2023-05-02 RX ORDER — VALACYCLOVIR HYDROCHLORIDE 500 MG/1
500 TABLET, FILM COATED ORAL EVERY OTHER DAY
Status: DISCONTINUED | OUTPATIENT
Start: 2023-05-03 | End: 2023-05-06 | Stop reason: HOSPADM

## 2023-05-02 RX ORDER — SPIRONOLACTONE 100 MG/1
100 TABLET, FILM COATED ORAL DAILY
Status: DISCONTINUED | OUTPATIENT
Start: 2023-05-02 | End: 2023-05-06 | Stop reason: HOSPADM

## 2023-05-02 RX ORDER — BENZONATATE 100 MG/1
100 CAPSULE ORAL
Status: DISCONTINUED | OUTPATIENT
Start: 2023-05-02 | End: 2023-05-06 | Stop reason: HOSPADM

## 2023-05-02 RX ORDER — LIDOCAINE HYDROCHLORIDE 10 MG/ML
4 INJECTION, SOLUTION EPIDURAL; INFILTRATION; INTRACAUDAL; PERINEURAL
Status: COMPLETED | OUTPATIENT
Start: 2023-05-02 | End: 2023-05-02

## 2023-05-02 RX ORDER — ONDANSETRON 4 MG/1
4 TABLET, ORALLY DISINTEGRATING ORAL
Status: DISCONTINUED | OUTPATIENT
Start: 2023-05-02 | End: 2023-05-06 | Stop reason: HOSPADM

## 2023-05-02 RX ORDER — ACETAMINOPHEN 325 MG/1
650 TABLET ORAL
Status: DISCONTINUED | OUTPATIENT
Start: 2023-05-02 | End: 2023-05-06 | Stop reason: HOSPADM

## 2023-05-02 RX ORDER — MIDODRINE HYDROCHLORIDE 5 MG/1
5 TABLET ORAL
Status: DISCONTINUED | OUTPATIENT
Start: 2023-05-02 | End: 2023-05-06 | Stop reason: HOSPADM

## 2023-05-02 RX ORDER — SODIUM CHLORIDE 0.9 % (FLUSH) 0.9 %
5-40 SYRINGE (ML) INJECTION AS NEEDED
Status: DISCONTINUED | OUTPATIENT
Start: 2023-05-02 | End: 2023-05-06 | Stop reason: HOSPADM

## 2023-05-02 RX ORDER — OXYCODONE HYDROCHLORIDE 5 MG/1
5 TABLET ORAL
Status: DISCONTINUED | OUTPATIENT
Start: 2023-05-02 | End: 2023-05-06 | Stop reason: HOSPADM

## 2023-05-02 RX ORDER — FUROSEMIDE 40 MG/1
40 TABLET ORAL DAILY
Status: DISCONTINUED | OUTPATIENT
Start: 2023-05-02 | End: 2023-05-06 | Stop reason: HOSPADM

## 2023-05-02 RX ORDER — ALBUMIN HUMAN 50 G/1000ML
25 SOLUTION INTRAVENOUS EVERY 6 HOURS
Status: COMPLETED | OUTPATIENT
Start: 2023-05-02 | End: 2023-05-02

## 2023-05-02 RX ORDER — NADOLOL 40 MG/1
20 TABLET ORAL DAILY
Status: DISCONTINUED | OUTPATIENT
Start: 2023-05-02 | End: 2023-05-06 | Stop reason: HOSPADM

## 2023-05-02 RX ORDER — POLYETHYLENE GLYCOL 3350 17 G/17G
17 POWDER, FOR SOLUTION ORAL DAILY PRN
Status: DISCONTINUED | OUTPATIENT
Start: 2023-05-02 | End: 2023-05-06 | Stop reason: HOSPADM

## 2023-05-02 RX ORDER — ONDANSETRON 2 MG/ML
4 INJECTION INTRAMUSCULAR; INTRAVENOUS
Status: DISCONTINUED | OUTPATIENT
Start: 2023-05-02 | End: 2023-05-06 | Stop reason: HOSPADM

## 2023-05-02 RX ORDER — ACETAMINOPHEN 650 MG/1
650 SUPPOSITORY RECTAL
Status: DISCONTINUED | OUTPATIENT
Start: 2023-05-02 | End: 2023-05-06 | Stop reason: HOSPADM

## 2023-05-02 RX ORDER — VALACYCLOVIR HYDROCHLORIDE 500 MG/1
500 TABLET, FILM COATED ORAL EVERY OTHER DAY
Status: DISCONTINUED | OUTPATIENT
Start: 2023-05-02 | End: 2023-05-02 | Stop reason: SDUPTHER

## 2023-05-02 RX ORDER — IPRATROPIUM BROMIDE AND ALBUTEROL SULFATE 2.5; .5 MG/3ML; MG/3ML
3 SOLUTION RESPIRATORY (INHALATION)
Status: DISCONTINUED | OUTPATIENT
Start: 2023-05-02 | End: 2023-05-06 | Stop reason: HOSPADM

## 2023-05-02 RX ORDER — ENOXAPARIN SODIUM 100 MG/ML
40 INJECTION SUBCUTANEOUS DAILY
Status: DISCONTINUED | OUTPATIENT
Start: 2023-05-02 | End: 2023-05-03

## 2023-05-02 RX ORDER — HYDROMORPHONE HYDROCHLORIDE 1 MG/ML
0.5 INJECTION, SOLUTION INTRAMUSCULAR; INTRAVENOUS; SUBCUTANEOUS
Status: DISCONTINUED | OUTPATIENT
Start: 2023-05-02 | End: 2023-05-06 | Stop reason: HOSPADM

## 2023-05-02 RX ORDER — POTASSIUM CHLORIDE 750 MG/1
40 TABLET, FILM COATED, EXTENDED RELEASE ORAL
Status: COMPLETED | OUTPATIENT
Start: 2023-05-02 | End: 2023-05-02

## 2023-05-02 RX ADMIN — PREDNISONE 5 MG: 5 TABLET ORAL at 09:23

## 2023-05-02 RX ADMIN — SODIUM CHLORIDE, PRESERVATIVE FREE 10 ML: 5 INJECTION INTRAVENOUS at 13:29

## 2023-05-02 RX ADMIN — ALBUMIN (HUMAN) 25 G: 12.5 INJECTION, SOLUTION INTRAVENOUS at 23:40

## 2023-05-02 RX ADMIN — MIDODRINE HYDROCHLORIDE 5 MG: 5 TABLET ORAL at 17:27

## 2023-05-02 RX ADMIN — MIDODRINE HYDROCHLORIDE 5 MG: 5 TABLET ORAL at 14:18

## 2023-05-02 RX ADMIN — AZITHROMYCIN 500 MG: 500 INJECTION, POWDER, LYOPHILIZED, FOR SOLUTION INTRAVENOUS at 01:28

## 2023-05-02 RX ADMIN — ALBUMIN (HUMAN) 25 G: 12.5 INJECTION, SOLUTION INTRAVENOUS at 19:00

## 2023-05-02 RX ADMIN — ACETAMINOPHEN 650 MG: 325 TABLET ORAL at 23:47

## 2023-05-02 RX ADMIN — SODIUM CHLORIDE 1 G: 9 INJECTION INTRAMUSCULAR; INTRAVENOUS; SUBCUTANEOUS at 01:27

## 2023-05-02 RX ADMIN — ACETAMINOPHEN 650 MG: 325 TABLET ORAL at 17:48

## 2023-05-02 RX ADMIN — ALBUMIN (HUMAN) 25 G: 12.5 INJECTION, SOLUTION INTRAVENOUS at 13:08

## 2023-05-02 RX ADMIN — LIDOCAINE HYDROCHLORIDE 4 ML: 10 INJECTION, SOLUTION EPIDURAL; INFILTRATION; INTRACAUDAL; PERINEURAL at 11:58

## 2023-05-02 RX ADMIN — SODIUM CHLORIDE, PRESERVATIVE FREE 10 ML: 5 INJECTION INTRAVENOUS at 22:02

## 2023-05-02 RX ADMIN — ALBUMIN (HUMAN) 12.5 G: 12.5 INJECTION, SOLUTION INTRAVENOUS at 09:23

## 2023-05-02 RX ADMIN — SODIUM CHLORIDE 500 ML: 9 INJECTION, SOLUTION INTRAVENOUS at 13:05

## 2023-05-02 RX ADMIN — POTASSIUM CHLORIDE 40 MEQ: 750 TABLET, FILM COATED, EXTENDED RELEASE ORAL at 13:08

## 2023-05-02 NOTE — H&P
Hospitalist Admission Note    NAME:  Ilia Muniz   :     MRN:  [id number]     Date/Time:  2023 12:43 AM    Patient PCP: Tamir Santoyo MD  ________________________________________________________________________    Given the patient's current clinical presentation, I have a high level of concern for decompensation if discharged from the emergency department. Complex decision making was performed, which includes reviewing the patient's available past medical records, laboratory results, and x-ray films. My assessment of this patient's clinical condition and my plan of care is as follows. Assessment / Plan:    R Pleural Effusion:    The patient comes in w/ severe R pleural effusion w/ autoimmune hepatitis     VS - normal oxy sat on 4L NC  WBC normal< Hg 14.3  Bun 14, Cr 0.87  CXR - Severe R pleural effusion    Admit and cont to monitor  Cont w/ Aldactone 100mg daily and Lasix 40mg daily  Consult IR for thoracocentesis d/t severe volume of pleural effusion  Hold ABX as low suspicion for pneumonia    2. Autoimmune Hepatitis:    Cont w/ Nadolol 20mg daily  Cont w/ prednisone 5mg daily    Body mass index is 24.31 kg/m².:  18.5 - 24.9:  Normal weight    I have personally reviewed the radiographs, laboratory data in Epic and decisions and statements above are based partially on this personal interpretation. 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  Associated symptoms include chronic orthopnea. She denies any chest pain, wheezing, coughing, weight gain or lower extremity edema. She has been taking her medications on a regular basis.   She has no fever, chills or night sweats. She has no abdominal pain, nausea, vomiting or diarrhea. She has not had any recent sick contacts or travel history. Past Medical History:   Diagnosis Date    Breast CA ([facility]     Cancer ([facility]     breast cancer    Coagulation defects 2001    ITP    Esophageal varices (HCC)     Liver disease     autoimmune liver disease    Mitral valve disorder     \"leaky heart valve\"    Portal hypertension ([facility]       Past Surgical History:   Procedure Laterality Date    COLONOSCOPY N/A 2/1/2017    COLONOSCOPY performed by Malgorzata Aguilar MD at [facility] ENDOSCOPY    HX GI      banding varicies    HX GYN      tubaligation    HX ORTHOPAEDIC      knee replacement    HX OTHER SURGICAL      partial thyroidectomy 1999    [place] UNLISTED PROCEDURE BREAST      lumpectomy     Social History     Tobacco Use    Smoking status: Never    Smokeless tobacco: Never   Substance Use Topics    Alcohol use: No      Family History   Problem Relation Age of Onset    Cancer Mother         Allergies   Allergen Reactions    Aspirin Anaphylaxis    Mushroom Hives        Prior to Admission medications    Medication Sig Start Date End Date Taking? Authorizing Provider   spironolactone (ALDACTONE) 100 mg tablet Take 1 Tablet by mouth daily. Indications: accumulation of fluid caused by cirrhosis of the liver 4/27/23   Micheline Gonzalez NP   furosemide (LASIX) 40 mg tablet Take 1 Tablet by mouth daily. Indications: accumulation of fluid caused by cirrhosis of the liver 4/27/23   Micheline Gonzalez NP   ferrous sulfate 325 mg (65 mg iron) tablet Take  by mouth. Taking one tab twice a week    Provider, Historical   cetirizine (ZYRTEC) 10 mg tablet Take 5 mg by mouth. Taking half of one tab daily    Provider, Historical   cholecalciferol (VITAMIN D3) 25 mcg (1,000 unit) cap Take 1,000 Units by mouth daily. Provider, Historical   valACYclovir (VALTREX) 500 mg tablet Take 500 mg by mouth every other day.     Provider, Historical   multivitamin (ONE A DAY) tablet Take 1 tablet by mouth daily. Provider, Historical   nadolol (CORGARD) 20 mg tablet Take  by mouth daily. Provider, Historical   predniSONE (DELTASONE) 1 mg tablet Take 5 mg by mouth daily.  1/13/11   Provider, Historical       REVIEW OF SYSTEMS:  See HPI for details  General: negative for fever, chills, sweats, weakness, weight loss  Eyes: negative for blurred vision, eye pain, loss of vision, diplopia  Ear Nose and Throat: negative for rhinorrhea, pharyngitis, otalgia, tinnitus, speech or swallowing difficulties  Respiratory:  negative for pleuritic pain, cough, sputum production, wheezing, +SOB, SALDIVAR  Cardiology:  negative for chest pain, palpitations, orthopnea, PND, edema, syncope   Gastrointestinal: negative for abdominal pain, N/V, dysphagia, change in bowel habits, bleeding  Genitourinary: negative for frequency, urgency, dysuria, hematuria, incontinence  Muskuloskeletal : negative for arthralgia, myalgia  Hematology: negative for easy bruising, bleeding, lymphadenopathy  Dermatological: negative for rash, ulceration, mole change, new lesion  Endocrine: negative for hot flashes or polydipsia  Neurological: negative for headache, dizziness, confusion, focal weakness, paresthesia, memory loss, gait disturbance  Psychological: negative for anxiety, depression, agitation    Objective:   VITALS:    Visit Vitals  /89   Pulse 81   Temp 98.5 °F (36.9 °C)   Resp 29   Wt 66.3 kg (146 lb 1.6 oz)   SpO2 96%   BMI 24.31 kg/m²     PHYSICAL EXAM:    Physical Exam:    Gen: Well-developed, well-nourished, in no acute distress  HEENT:  Pink conjunctivae, PERRL, hearing intact to voice, moist mucous membranes  Neck: Supple, without masses, thyroid non-tender  Resp: No accessory muscle use, clear breath sounds without wheezes rales or rhonchi  Card: No murmurs, normal S1, S2 without thrills, bruits or peripheral edema  Abd:  Soft, non-tender, non-distended, normoactive bowel sounds are present, no palpable organomegaly and no detectable hernias  Lymph:  No cervical or inguinal adenopathy  Musc: No cyanosis or clubbing  Skin: No rashes or ulcers, skin turgor is good  Neuro:  Cranial nerves are grossly intact, no focal motor weakness, follows commands appropriately  Psych:  Good insight, oriented to person, place and time, alert  _______________________________________________________________________  Care Plan discussed with:  Pt's condition, Imaging findings, Lab findings, Assessment, and Care Plan discussed with: Patient  _______________________________________________________________________    Probable disposition:  Home  ________________________________________________________________________    Comments   >50% of visit spent in counseling and coordination of care  Chart reviewed  Discussion with patient and/or family and questions answered     ________________________________________________________________________  Signed: Chucky Oconnor MD        Procedures: see electronic medical records for all procedures/Xrays and details which were not copied into this note but were reviewed prior to creation of Plan.     LAB DATA REVIEWED:    Recent Results (from the past 24 hour(s))   EKG, 12 LEAD, INITIAL    Collection Time: 05/01/23 10:00 PM   Result Value Ref Range    Ventricular Rate 86 BPM    Atrial Rate 80 BPM    QRS Duration 90 ms    Q-T Interval 404 ms    QTC Calculation (Bezet) 483 ms    Calculated R Axis -3 degrees    Calculated T Axis 60 degrees    Diagnosis       Undetermined rhythm  Lateral infarct , age undetermined  Abnormal ECG  When compared with ECG of 14-FEB-2008 13:17,  Current undetermined rhythm precludes rhythm comparison, needs review  Lateral infarct is now present  T wave inversion now evident in Lateral leads  QT has lengthened     BLOOD GAS, ARTERIAL    Collection Time: 05/01/23 10:21 PM   Result Value Ref Range    pH 7.50 (H) 7.35 - 7.45      PCO2 34 (L) 35 - 45 mmHg    PO2 56 (L) 80 - 100 mmHg O2 SAT 92 92 - 97 %    BICARBONATE 25 22 - 26 mmol/L    BASE EXCESS 2.7 mmol/L    O2 METHOD ROOM AIR      Sample source ARTERIAL      SITE LEFT RADIAL      KENNEDY'S TEST YES     CBC WITH AUTOMATED DIFF    Collection Time: 05/01/23 10:23 PM   Result Value Ref Range    WBC 6.9 3.6 - 11.0 K/uL    RBC 4.45 3.80 - 5.20 M/uL    HGB 14.3 11.5 - 16.0 g/dL    HCT 42.5 35.0 - 47.0 %    MCV 95.5 80.0 - 99.0 FL    MCH 32.1 26.0 - 34.0 PG    MCHC 33.6 30.0 - 36.5 g/dL    RDW 14.9 (H) 11.5 - 14.5 %    PLATELET 80 (L) 357 - 400 K/uL    MPV 10.4 8.9 - 12.9 FL    NRBC 0.0 0  WBC    ABSOLUTE NRBC 0.00 0.00 - 0.01 K/uL    NEUTROPHILS 78 (H) 32 - 75 %    LYMPHOCYTES 15 12 - 49 %    MONOCYTES 7 5 - 13 %    EOSINOPHILS 0 0 - 7 %    BASOPHILS 0 0 - 1 %    IMMATURE GRANULOCYTES 0 0.0 - 0.5 %    ABS. NEUTROPHILS 5.4 1.8 - 8.0 K/UL    ABS. LYMPHOCYTES 1.0 0.8 - 3.5 K/UL    ABS. MONOCYTES 0.5 0.0 - 1.0 K/UL    ABS. EOSINOPHILS 0.0 0.0 - 0.4 K/UL    ABS. BASOPHILS 0.0 0.0 - 0.1 K/UL    ABS. IMM.  GRANS. 0.0 0.00 - 0.04 K/UL    DF SMEAR SCANNED      PLATELET COMMENTS DECREASED PLATELETS      RBC COMMENTS NORMOCYTIC, NORMOCHROMIC      WBC COMMENTS REACTIVE LYMPHS     METABOLIC PANEL, BASIC    Collection Time: 05/01/23 10:23 PM   Result Value Ref Range    Sodium 140 136 - 145 mmol/L    Potassium 3.2 (L) 3.5 - 5.1 mmol/L    Chloride 108 97 - 108 mmol/L    CO2 30 21 - 32 mmol/L    Anion gap 2 (L) 5 - 15 mmol/L    Glucose 126 (H) 65 - 100 mg/dL    BUN 14 6 - 20 MG/DL    Creatinine 0.87 0.55 - 1.02 MG/DL    BUN/Creatinine ratio 16 12 - 20      eGFR >60 >60 ml/min/1.73m2    Calcium 9.1 8.5 - 10.1 MG/DL   TROPONIN-HIGH SENSITIVITY    Collection Time: 05/01/23 10:23 PM   Result Value Ref Range    Troponin-High Sensitivity 28 0 - 51 ng/L   NT-PRO BNP    Collection Time: 05/01/23 10:23 PM   Result Value Ref Range    NT pro- (H) <125 PG/ML   PROTHROMBIN TIME + INR    Collection Time: 05/01/23 10:23 PM   Result Value Ref Range    INR 1.2 (H) 0.9 - 1.1      Prothrombin time 12.4 (H) 9.0 - 11.1 sec   SAMPLES BEING HELD    Collection Time: 05/01/23 10:23 PM   Result Value Ref Range    SAMPLES BEING HELD NO SAMPLE RECEIVED      COMMENT        Add-on orders for these samples will be processed based on acceptable specimen integrity and analyte stability, which may vary by analyte.    POC LACTIC ACID    Collection Time: 05/01/23 10:32 PM   Result Value Ref Range    Lactic Acid (POC) 2.52 (HH) 0.40 - 2.00 mmol/L

## 2023-05-02 NOTE — PROGRESS NOTES
[facility]  [address] [facility], [address] [place] [address]  [phone]      Hospitalist Progress Note      NAME: Karie Dupree   :  [id number]  MRM:  [id number]    Date/Time of service: 2023  8:22 AM       Subjective:     Chief Complaint:  Patient was personally seen and examined by me during this time period. Chart reviewed. C/o fatigue, dyspnea       Objective:       Vitals:       Last 24hrs VS reviewed since prior progress note.  Most recent are:    Visit Vitals  /61 (BP Patient Position: Sitting)   Pulse 91   Temp 98.9 °F (37.2 °C)   Resp 29   Wt 66.3 kg (146 lb 1.6 oz)   SpO2 98%   BMI 24.31 kg/m²     SpO2 Readings from Last 6 Encounters:   23 98%   23 94%   23 96%   23 93%   22 96%   20 97%    O2 Flow Rate (L/min): 4 l/min   No intake or output data in the 24 hours ending 23 2506     Exam:     Physical Exam:    Gen:  elderly, frail, NAD  HEENT:  Pink conjunctivae, PERRL, hearing intact to voice, moist mucous membranes  Neck:  Supple, without masses, thyroid non-tender  Resp:  No accessory muscle use, poor BS on right side   Card:  No murmurs, normal S1, S2 without thrills, bruits or peripheral edema  Abd:  Soft, non-tender, non-distended, normoactive bowel sounds are present  Musc:  No cyanosis or clubbing  Skin:  No rashes   Neuro:  Cranial nerves 3-12 are grossly intact, follows commands appropriately  Psych:  Good insight, oriented to person, place and time, alert    Medications Reviewed: (see below)    Lab Data Reviewed: (see below)    ______________________________________________________________________    Medications:     Current Facility-Administered Medications   Medication Dose Route Frequency    [Held by provider] furosemide (LASIX) tablet 40 mg  40 mg Oral DAILY    [Held by provider] nadoloL (CORGARD) tablet 20 mg  20 mg Oral DAILY    predniSONE (DELTASONE) tablet 5 mg  5 mg Oral DAILY    [Held by provider] spironolactone (ALDACTONE) tablet 100 mg  100 mg Oral DAILY    sodium chloride (NS) flush 5-40 mL  5-40 mL IntraVENous Q8H    sodium chloride (NS) flush 5-40 mL  5-40 mL IntraVENous PRN    acetaminophen (TYLENOL) tablet 650 mg  650 mg Oral Q6H PRN    Or    acetaminophen (TYLENOL) suppository 650 mg  650 mg Rectal Q6H PRN    polyethylene glycol (MIRALAX) packet 17 g  17 g Oral DAILY PRN    ondansetron (ZOFRAN ODT) tablet 4 mg  4 mg Oral Q8H PRN    Or    ondansetron (ZOFRAN) injection 4 mg  4 mg IntraVENous Q6H PRN    enoxaparin (LOVENOX) injection 40 mg  40 mg SubCUTAneous DAILY    [START ON 5/3/2023] valACYclovir (VALTREX) tablet 500 mg  500 mg Oral EVERY OTHER DAY    HYDROmorphone (DILAUDID) syringe 0.5 mg  0.5 mg IntraVENous Q4H PRN    oxyCODONE IR (ROXICODONE) tablet 5 mg  5 mg Oral Q4H PRN    albuterol-ipratropium (DUO-NEB) 2.5 MG-0.5 MG/3 ML  3 mL Nebulization Q6H PRN    benzonatate (TESSALON) capsule 100 mg  100 mg Oral TID PRN    guaiFENesin-dextromethorphan (ROBITUSSIN DM) 100-10 mg/5 mL syrup 10 mL  10 mL Oral Q6H PRN    aspirin 325 mg tablet         Current Outpatient Medications   Medication Sig    spironolactone (ALDACTONE) 100 mg tablet Take 1 Tablet by mouth daily. Indications: accumulation of fluid caused by cirrhosis of the liver    furosemide (LASIX) 40 mg tablet Take 1 Tablet by mouth daily. Indications: accumulation of fluid caused by cirrhosis of the liver    ferrous sulfate 325 mg (65 mg iron) tablet Take  by mouth. Taking one tab twice a week    cetirizine (ZYRTEC) 10 mg tablet Take 5 mg by mouth. Taking half of one tab daily    cholecalciferol (VITAMIN D3) 25 mcg (1,000 unit) cap Take 1,000 Units by mouth daily. valACYclovir (VALTREX) 500 mg tablet Take 500 mg by mouth every other day. multivitamin (ONE A DAY) tablet Take 1 tablet by mouth daily. nadolol (CORGARD) 20 mg tablet Take  by mouth daily. predniSONE (DELTASONE) 1 mg tablet Take 5 mg by mouth daily.           Lab Review: Recent Labs     05/01/23  2223   WBC 6.9   HGB 14.3   HCT 42.5   PLT 80*     Recent Labs     05/02/23  0417 05/01/23  2223   NA  --  140   K  --  3.2*   CL  --  108   CO2  --  30   GLU  --  126*   BUN  --  14   CREA  --  0.87   CA  --  9.1   ALB 2.8*  --    TBILI 2.0*  --    ALT 44  --    INR  --  1.2*     No results found for: GLUCPOC       Assessment / Plan:     75 yo hx of autoimmune hepatitis, cirrhosis, presented w/ dyspnea, large R pleural effusions    1) Large R pleural effusion: due to cirrhosis. Will obtain echo. Will consult IR for thoracentesis. Will also consult Pulm    2) Hypotension: due to cirrhosis, pleural effusion. Will hold nadolol, aldactone, lasix. Will start IV albumin    3) Cirrhosis/ascites/esophageal varices: due to autoimmune hepatitis. Follows by Dr. Rere Boudreaux.   Holding meds due to hypotension    4) Autoimmune hepatitis: will cont prednisone    **Prior records, notes, labs, radiology, and medications reviewed in [facility]**    Total time spent with patient care: 45 Minutes **I personally saw and examined the patient during this time period**                 Care Plan discussed with: Patient, nursing     Discussed:  Care Plan    Prophylaxis:  SCD's    Disposition:  Home w/Family           ___________________________________________________    Attending Physician: Cinthya Ochoa MD

## 2023-05-02 NOTE — CONSULTS
Name: Lito Maldonado: HESIODO   : [id number] Admit Date: 2023   Phone: [phone]  Room: [place] [address]   PCP: Mita Small MD  MRN: [id number]   Date: 2023  Code: Full Code          Chart and notes reviewed. Data reviewed. I review the patient's current medications in the medical record at each encounter. I have evaluated and examined the patient. HPI:    8:04 AM       History was obtained from patient. I was asked by Gabe Maciel MD to see Qian Grijalva in consultation for a chief complaint of pleural effusion. History of Present Illness:  Ms. Cecile Medeiros is a 73 yo woman with a history of Autoimmune Hepatitis with cirrhosis with ascites and esophageal varices, ITP, breast cancer and mitral valve disorder who is admitted with acute respiratory failure with hypoxia and a large R pleural effusion. She describes progressive shortness of breath on exertion that got notably worse in the day or so leading up to presentation. She now has shortness of breath and orthopnea. No cough, wheezing, or chest tightness. Does have some pleuritic pain with deep breaths and with positional changes. Denies peripheral edema. No f/c, abdomina. pain/distension.     Labs reviewed: WBC 6.9, PLT 80, INR 1.2, cr 0.87, proBNP 196, AST 57, ALT 44, Alk Phos 140, ABG 7.50/34/56 on RA    Images reviewed:  CXR 2023: large right pleural effusion    CTA 2023: large R pleural effusion with some mild mediastinal shift, R lung nearly completely collapsed, negative for PE      Past Medical History:   Diagnosis Date    Breast CA ([address]     Cancer ([address]     breast cancer    Coagulation defects     ITP    Esophageal varices (HCC)     Liver disease     autoimmune liver disease    Mitral valve disorder     \"leaky heart valve\"    Portal hypertension ([address]        Past Surgical History:   Procedure Laterality Date    COLONOSCOPY N/A 2017    COLONOSCOPY performed by Maynor Knapp MD at [facility] ENDOSCOPY    HX GI      banding varicies    HX GYN      tubaligation    HX ORTHOPAEDIC      knee replacement    HX OTHER SURGICAL      partial thyroidectomy 1999    [place] UNLISTED PROCEDURE BREAST      lumpectomy       Family History   Problem Relation Age of Onset    Cancer Mother        Social History     Tobacco Use    Smoking status: Never    Smokeless tobacco: Never   Substance Use Topics    Alcohol use: No       Allergies   Allergen Reactions    Aspirin Anaphylaxis    Mushroom Hives       Current Facility-Administered Medications   Medication Dose Route Frequency    furosemide (LASIX) tablet 40 mg  40 mg Oral DAILY    nadoloL (CORGARD) tablet 20 mg  20 mg Oral DAILY    predniSONE (DELTASONE) tablet 5 mg  5 mg Oral DAILY    spironolactone (ALDACTONE) tablet 100 mg  100 mg Oral DAILY    sodium chloride (NS) flush 5-40 mL  5-40 mL IntraVENous Q8H    sodium chloride (NS) flush 5-40 mL  5-40 mL IntraVENous PRN    acetaminophen (TYLENOL) tablet 650 mg  650 mg Oral Q6H PRN    Or    acetaminophen (TYLENOL) suppository 650 mg  650 mg Rectal Q6H PRN    polyethylene glycol (MIRALAX) packet 17 g  17 g Oral DAILY PRN    ondansetron (ZOFRAN ODT) tablet 4 mg  4 mg Oral Q8H PRN    Or    ondansetron (ZOFRAN) injection 4 mg  4 mg IntraVENous Q6H PRN    enoxaparin (LOVENOX) injection 40 mg  40 mg SubCUTAneous DAILY    [START ON 5/3/2023] valACYclovir (VALTREX) tablet 500 mg  500 mg Oral EVERY OTHER DAY    HYDROmorphone (DILAUDID) syringe 0.5 mg  0.5 mg IntraVENous Q4H PRN    oxyCODONE IR (ROXICODONE) tablet 5 mg  5 mg Oral Q4H PRN    albuterol-ipratropium (DUO-NEB) 2.5 MG-0.5 MG/3 ML  3 mL Nebulization Q6H PRN    benzonatate (TESSALON) capsule 100 mg  100 mg Oral TID PRN    guaiFENesin-dextromethorphan (ROBITUSSIN DM) 100-10 mg/5 mL syrup 10 mL  10 mL Oral Q6H PRN    aspirin 325 mg tablet         Current Outpatient Medications   Medication Sig    spironolactone (ALDACTONE) 100 mg tablet Take 1 Tablet by mouth daily.  Indications: accumulation of fluid caused by cirrhosis of the liver    furosemide (LASIX) 40 mg tablet Take 1 Tablet by mouth daily. Indications: accumulation of fluid caused by cirrhosis of the liver    ferrous sulfate 325 mg (65 mg iron) tablet Take  by mouth. Taking one tab twice a week    cetirizine (ZYRTEC) 10 mg tablet Take 5 mg by mouth. Taking half of one tab daily    cholecalciferol (VITAMIN D3) 25 mcg (1,000 unit) cap Take 1,000 Units by mouth daily. valACYclovir (VALTREX) 500 mg tablet Take 500 mg by mouth every other day. multivitamin (ONE A DAY) tablet Take 1 tablet by mouth daily. nadolol (CORGARD) 20 mg tablet Take  by mouth daily. predniSONE (DELTASONE) 1 mg tablet Take 5 mg by mouth daily. REVIEW OF SYSTEMS   12 point ROS negative except as stated in the HPI. Physical Exam:   Visit Vitals  /61 (BP Patient Position: Sitting)   Pulse 91   Temp 98.9 °F (37.2 °C)   Resp 29   Wt 66.3 kg (146 lb 1.6 oz)   SpO2 98%   BMI 24.31 kg/m²       General:  Alert, cooperative, no distress, appears stated age. Head:  Normocephalic, without obvious abnormality, atraumatic. Eyes:  Conjunctivae/corneas clear. Nose: Nares normal. Septum midline. Throat: Lips, mucosa, and tongue normal.    Neck: Supple, symmetrical, trachea midline   Lungs:   Faint breath sounds at the right apex otherwise breath sounds not discernable on the R; clear on the L   Chest wall:  No tenderness or deformity. Heart:  Regular rate and rhythm, S1, S2 normal, no murmur, click, rub or gallop. Abdomen:   Soft, non-tender. Bowel sounds normal.    Extremities: Extremities normal, atraumatic, no cyanosis or edema. Pulses: 2+ and symmetric all extremities.    Skin: Skin color, texture, turgor normal. No rashes or lesions       Neurologic: Grossly nonfocal       Lab Results   Component Value Date/Time    Sodium 140 05/01/2023 10:23 PM    Potassium 3.2 (L) 05/01/2023 10:23 PM    Chloride 108 05/01/2023 10:23 PM    CO2 30 05/01/2023 10:23 PM    BUN 14 05/01/2023 10:23 PM    Creatinine 0.87 05/01/2023 10:23 PM    Glucose 126 (H) 05/01/2023 10:23 PM    Calcium 9.1 05/01/2023 10:23 PM       Lab Results   Component Value Date/Time    WBC 6.9 05/01/2023 10:23 PM    HGB 14.3 05/01/2023 10:23 PM    PLATELET 80 (L) 86/90/1660 10:23 PM    MCV 95.5 05/01/2023 10:23 PM       Lab Results   Component Value Date/Time    INR 1.2 (H) 05/01/2023 10:23 PM    Alk. phosphatase 140 (H) 05/02/2023 04:17 AM    Protein, total 7.4 05/02/2023 04:17 AM    Albumin 2.8 (L) 05/02/2023 04:17 AM    Globulin 4.6 (H) 05/02/2023 04:17 AM       No results found for: IRON, FE, TIBC, IBCT, PSAT, FERR    No results found for: SR, CRP, GABY, ANAIGG, RA, RPR, RPRT, VDRLT, VDRLS, TSH, TSHEXT     Lab Results   Component Value Date/Time    PH 7.50 (H) 05/01/2023 10:21 PM    PCO2 34 (L) 05/01/2023 10:21 PM    PO2 56 (L) 05/01/2023 10:21 PM    HCO3 25 05/01/2023 10:21 PM       No results found for: CPK, RCK1, RCK2, RCK3, RCK4, CKNDX, CKND1, TROPT, TROIQ, BNPP, BNP     No results found for: CULT    No results found for: TOXA1, RPR, HBCM, HBSAG, HAAB, HCAB1, HAAT, G6PD, CRYAC, HIVGT, HIVR, HIV1, HIV12, HIVPC, HIVRPI    No results found for: VANCT, CPK    No results found for: COLOR, APPRN, SPGRU, SINAI, PROTU, GLUCU, KETU, BILU, BLDU, UROU, SIM, LEUKU, WBCU, RBCU, UEPI, BACTU, CASTS, UCRY    IMPRESSION  Pleural effusion, suspect this is a hepatohydrothorax  Cirrhosis secondary to Autoimmune hepatitis  ITP  History of Breast Cancer    PLAN  Goal sats 90% or higher, wean O2 as tolerated  Primary service has consulted IR for thoracentesis, labs have been ordered and added pleural cytology as well  On chronic diuretics and will likely need chronic adjustments, have consulted GI   On prednisone for ITP  TTE pending      Thank you for allowing us to participate in the care of this patient. We will be happy to follow along in his/her progress with you.     Samia Wesley MD

## 2023-05-02 NOTE — ED TRIAGE NOTES
Pt arrives to ED via EMS c/o shoulder pain that started yaneli 4:30pm that radiated to chest. Now w/sob as well.

## 2023-05-02 NOTE — ED PROVIDER NOTES
77-year-old male history of breast cancer, coagulation deficits esophageal varices, liver disease, hypertension presents ED EMS for evaluation of chest comfort. Patient reports at 4 PM she developed bilateral shoulder pain which are radiated into her chest and is not there is chest pressure and dyspnea. Denies history of coronary artery disease or COPD does not smoke. Denies fevers or chills. Sensibly. Patient required oxygen with EMS notes on 6 L per EMS. Patient does have a history of valvular disease soft varices. Reports that her abdomen is always distended. Denies any nausea or vomiting. Chest Pain (Angina)   Associated symptoms include shortness of breath. Pertinent negatives include no abdominal pain and no fever.       Past Medical History:   Diagnosis Date    Breast CA ([facility] [address]     Cancer ([facility] [address]     breast cancer    Coagulation defects 2001    ITP    Esophageal varices (HCC)     Liver disease     autoimmune liver disease    Mitral valve disorder     \"leaky heart valve\"    Portal hypertension ([facility] [address]        Past Surgical History:   Procedure Laterality Date    COLONOSCOPY N/A 2/1/2017    COLONOSCOPY performed by Tashi Weinberg MD at [facility] ENDOSCOPY    HX GI      banding varicies    HX GYN      tubaligation    HX ORTHOPAEDIC      knee replacement    HX OTHER SURGICAL      partial thyroidectomy 1999    HI UNLISTED PROCEDURE BREAST      lumpectomy         Family History:   Problem Relation Age of Onset    Cancer Mother        Social History     Socioeconomic History    Marital status:      Spouse name: Not on file    Number of children: Not on file    Years of education: Not on file    Highest education level: Not on file   Occupational History    Not on file   Tobacco Use    Smoking status: Never    Smokeless tobacco: Never   Vaping Use    Vaping Use: Former   Substance and Sexual Activity    Alcohol use: No    Drug use: Never    Sexual activity: Not on file   Other Topics Concern    Not on file Social History Narrative    Not on file     Social Determinants of Health     Financial Resource Strain: Not on file   Food Insecurity: Not on file   Transportation Needs: Not on file   Physical Activity: Not on file   Stress: Not on file   Social Connections: Not on file   Intimate Partner Violence: Not on file   Housing Stability: Not on file         ALLERGIES: Aspirin and Mushroom    Review of Systems   Constitutional:  Negative for fever. Respiratory:  Positive for shortness of breath. Cardiovascular:  Positive for chest pain. Gastrointestinal:  Negative for abdominal pain. Genitourinary:  Negative for dysuria. Vitals:    05/01/23 2158   Temp: 98.5 °F (36.9 °C)   Weight: 66.3 kg (146 lb 1.6 oz)            Physical Exam  Vitals and nursing note reviewed. Constitutional:       General: She is in acute distress. Appearance: Normal appearance. She is not ill-appearing. HENT:      Head: Normocephalic and atraumatic. Right Ear: External ear normal.      Left Ear: External ear normal.      Nose: Nose normal.      Mouth/Throat:      Mouth: Mucous membranes are moist.      Pharynx: Oropharynx is clear. Eyes:      Extraocular Movements: Extraocular movements intact. Conjunctiva/sclera: Conjunctivae normal.   Cardiovascular:      Rate and Rhythm: Regular rhythm. Tachycardia present. Pulses: Normal pulses. Heart sounds: Normal heart sounds. Pulmonary:      Effort: Respiratory distress present. Breath sounds: Normal breath sounds. No wheezing. Abdominal:      General: Abdomen is flat. Bowel sounds are normal. There is distension. Palpations: Abdomen is soft. Tenderness: There is no abdominal tenderness. There is no guarding. Genitourinary:     Comments: Deferred  Musculoskeletal:         General: No swelling. Normal range of motion. Cervical back: Normal range of motion. Skin:     General: Skin is warm and dry.       Capillary Refill: Capillary refill takes less than 2 seconds. Findings: No rash. Neurological:      General: No focal deficit present. Mental Status: She is alert and oriented to person, place, and time. Comments: Moving all extremities   Psychiatric:         Mood and Affect: Mood normal.         Behavior: Behavior normal.      Comments: Has decision making capacity        Medical Decision Making  Differential includes ACS, pneumonia, heart failure, pulm embolism. Amount and/or Complexity of Data Reviewed  Labs: ordered. Radiology: ordered. Risk  Prescription drug management. Decision regarding hospitalization. ED Course as of 05/02/23 0028   Mon May 01, 2023   2217 ECG performed at 2203 shows sinus rhythm, PVCs, normal intervals, patient is shaking and poor ECG [WG]   Tue May 02, 2023   0027 CTA Chest, CT abdomen pelvis w   [WG]   0027 IMPRESSION  1. Very large right pleural effusion causing leftward mediastinal shift and near  complete collapse of the right lung. 2. No pulmonary embolus. 3. Cirrhosis with portal hypertensive changes of splenomegaly, gastric and  distal esophageal varices, and large portosystemic shunt from the left renal  vein to the pelvis. 4. Otherwise no acute abnormality of the abdomen or pelvis [WG]      ED Course User Index  [WG] Mario Alberto Perez, DO       Procedures    Perfect Serve Consult for Admission  12:30 AM    ED Room Number: ER15/15  Patient Name and age:  Lalo Salvador 74 y.o.  female  Working Diagnosis:   1. Pleural effusion    2. Dyspnea, unspecified type    3. Elevated brain natriuretic peptide (BNP) level    4.  Cirrhosis of liver without ascites, unspecified hepatic cirrhosis type ([facility] [address]        COVID-19 Suspicion:  no  Sepsis present:  no  Reassessment needed: no  Code Status:  Full Code  Readmission: no  Isolation Requirements:  no  Recommended Level of Care:  telemetry  Department:[facility] ED - [phone]  Other:  15-year-old female with a history of breast cancer, esophageal varices liver disease cirrhosis hypertension patient evaluation of chest pain shortness of breath starting around 4 PM.  Patient is currently on 3 L nasal cannula oxygen given dyspnea and reported hypoxia. She is resting comfortably now. Denies history COPD or asthma does not smoke. She is afebrile. Blood pressure stable. She is not tachycardic. Initial breast cancer was concern for possible pulmonary embolism. CTA was performed showing a very large right-sided pleural effusion causing some lung collapse. Patient will benefit from therapeutic and diagnostic thoracentesis, patient be seen by dimensional radiology to have this performed. Patient is elevated BNP level 196, troponin 28. She does also have some cirrhosis with portal hypertensive changes of splenomegaly, gastric and distal esophageal varices and large portosystemic shunt and left renal vein to the pelvis. She has no anemia. She has no elevated white blood cell count , but does have an elevated lactic acid of 2.52 and with the perfusion this could be infectious source. For this reason of inpatient Rocephin azithromycin. She had initial lactic elevation of 2.52. She requires further admission and evaluation for the above.

## 2023-05-03 ENCOUNTER — APPOINTMENT (OUTPATIENT)
Dept: ULTRASOUND IMAGING | Age: 73
End: 2023-05-03
Attending: INTERNAL MEDICINE
Payer: MEDICARE

## 2023-05-03 ENCOUNTER — APPOINTMENT (OUTPATIENT)
Dept: GENERAL RADIOLOGY | Age: 73
End: 2023-05-03
Attending: INTERNAL MEDICINE
Payer: MEDICARE

## 2023-05-03 ENCOUNTER — APPOINTMENT (OUTPATIENT)
Dept: GENERAL RADIOLOGY | Age: 73
End: 2023-05-03
Attending: PHYSICIAN ASSISTANT
Payer: MEDICARE

## 2023-05-03 LAB
ALBUMIN SERPL-MCNC: 3.8 G/DL (ref 3.5–5)
ALBUMIN/GLOB SERPL: 1.2 (ref 1.1–2.2)
ALP SERPL-CCNC: 79 U/L (ref 45–117)
ALT SERPL-CCNC: 31 U/L (ref 12–78)
AMMONIA PLAS-SCNC: 52 UMOL/L
ANION GAP SERPL CALC-SCNC: 4 MMOL/L (ref 5–15)
AST SERPL-CCNC: 35 U/L (ref 15–37)
ATRIAL RATE: 78 BPM
BILIRUB DIRECT SERPL-MCNC: 1.2 MG/DL (ref 0–0.2)
BILIRUB SERPL-MCNC: 2.9 MG/DL (ref 0.2–1)
BUN SERPL-MCNC: 22 MG/DL (ref 6–20)
BUN/CREAT SERPL: 27 (ref 12–20)
CALCIUM SERPL-MCNC: 8.8 MG/DL (ref 8.5–10.1)
CALCULATED P AXIS, ECG09: 44 DEGREES
CALCULATED R AXIS, ECG10: -19 DEGREES
CALCULATED T AXIS, ECG11: 19 DEGREES
CHLORIDE SERPL-SCNC: 110 MMOL/L (ref 97–108)
CO2 SERPL-SCNC: 25 MMOL/L (ref 21–32)
CREAT SERPL-MCNC: 0.81 MG/DL (ref 0.55–1.02)
DIAGNOSIS, 93000: NORMAL
ECHO AV AREA PEAK VELOCITY: 2.6 CM2
ECHO AV AREA PEAK VELOCITY: 2.7 CM2
ECHO AV AREA PEAK VELOCITY: 2.8 CM2
ECHO AV AREA PEAK VELOCITY: 2.9 CM2
ECHO AV AREA VTI: 3 CM2
ECHO AV AREA/BSA VTI: 1.7 CM2/M2
ECHO AV MEAN GRADIENT: 7 MMHG
ECHO AV MEAN VELOCITY: 1.3 M/S
ECHO AV PEAK GRADIENT: 11 MMHG
ECHO AV PEAK GRADIENT: 11 MMHG
ECHO AV PEAK VELOCITY: 1.7 M/S
ECHO AV PEAK VELOCITY: 1.7 M/S
ECHO AV VTI: 26.4 CM
ECHO LA DIAMETER INDEX: 2.2 CM/M2
ECHO LA DIAMETER: 3.8 CM
ECHO LA VOL 2C: 73 ML (ref 22–52)
ECHO LA VOL 4C: 57 ML (ref 22–52)
ECHO LA VOL BP: 65 ML (ref 22–52)
ECHO LA VOL/BSA BIPLANE: 38 ML/M2 (ref 16–34)
ECHO LA VOLUME AREA LENGTH: 69 ML
ECHO LA VOLUME INDEX A2C: 42 ML/M2 (ref 16–34)
ECHO LA VOLUME INDEX A4C: 33 ML/M2 (ref 16–34)
ECHO LA VOLUME INDEX AREA LENGTH: 40 ML/M2 (ref 16–34)
ECHO LV E' LATERAL VELOCITY: 6 CM/S
ECHO LV E' SEPTAL VELOCITY: 6 CM/S
ECHO LV EDV A2C: 80 ML
ECHO LV EDV A4C: 72 ML
ECHO LV EDV BP: 79 ML (ref 56–104)
ECHO LV EDV INDEX A4C: 42 ML/M2
ECHO LV EDV INDEX BP: 46 ML/M2
ECHO LV EDV NDEX A2C: 46 ML/M2
ECHO LV EJECTION FRACTION A2C: 70 %
ECHO LV EJECTION FRACTION A4C: 67 %
ECHO LV EJECTION FRACTION BIPLANE: 69 % (ref 55–100)
ECHO LV ESV A2C: 24 ML
ECHO LV ESV A4C: 24 ML
ECHO LV ESV BP: 24 ML (ref 19–49)
ECHO LV ESV INDEX A2C: 14 ML/M2
ECHO LV ESV INDEX A4C: 14 ML/M2
ECHO LV ESV INDEX BP: 14 ML/M2
ECHO LV FRACTIONAL SHORTENING: 51 % (ref 28–44)
ECHO LV INTERNAL DIMENSION DIASTOLE INDEX: 2.49 CM/M2
ECHO LV INTERNAL DIMENSION DIASTOLIC: 4.3 CM (ref 3.9–5.3)
ECHO LV INTERNAL DIMENSION SYSTOLIC INDEX: 1.21 CM/M2
ECHO LV INTERNAL DIMENSION SYSTOLIC: 2.1 CM
ECHO LV IVSD: 0.9 CM (ref 0.6–0.9)
ECHO LV MASS 2D: 123.3 G (ref 67–162)
ECHO LV MASS INDEX 2D: 71.3 G/M2 (ref 43–95)
ECHO LV POSTERIOR WALL DIASTOLIC: 0.9 CM (ref 0.6–0.9)
ECHO LV RELATIVE WALL THICKNESS RATIO: 0.42
ECHO LVOT AREA: 2.8 CM2
ECHO LVOT AV VTI INDEX: 1.02
ECHO LVOT DIAM: 1.9 CM
ECHO LVOT MEAN GRADIENT: 5 MMHG
ECHO LVOT PEAK GRADIENT: 10 MMHG
ECHO LVOT PEAK GRADIENT: 9 MMHG
ECHO LVOT PEAK VELOCITY: 1.5 M/S
ECHO LVOT PEAK VELOCITY: 1.6 M/S
ECHO LVOT STROKE VOLUME INDEX: 44.2 ML/M2
ECHO LVOT SV: 76.5 ML
ECHO LVOT VTI: 27 CM
ECHO MV A VELOCITY: 1.02 M/S
ECHO MV E DECELERATION TIME (DT): 183.3 MS
ECHO MV E VELOCITY: 1.08 M/S
ECHO MV E/A RATIO: 1.06
ECHO MV E/E' LATERAL: 18
ECHO MV E/E' RATIO (AVERAGED): 18
ECHO MV E/E' SEPTAL: 18
ECHO MV REGURGITANT PEAK GRADIENT: 135 MMHG
ECHO MV REGURGITANT PEAK VELOCITY: 5.8 M/S
ECHO PULMONARY ARTERY END DIASTOLIC PRESSURE: 2 MMHG
ECHO PV MAX VELOCITY: 0.7 M/S
ECHO PV PEAK GRADIENT: 2 MMHG
ECHO PV REGURGITANT MAX VELOCITY: 0.7 M/S
ECHO RV FREE WALL PEAK S': 15 CM/S
ECHO RV INTERNAL DIMENSION: 3.2 CM
ECHO RV TAPSE: 1.9 CM (ref 1.7–?)
ECHO RVOT MEAN GRADIENT: 1 MMHG
ECHO RVOT PEAK GRADIENT: 1 MMHG
ECHO RVOT PEAK VELOCITY: 0.5 M/S
ECHO RVOT VTI: 10.3 CM
ECHO TV REGURGITANT MAX VELOCITY: 2.62 M/S
ECHO TV REGURGITANT PEAK GRADIENT: 28 MMHG
ERYTHROCYTE [DISTWIDTH] IN BLOOD BY AUTOMATED COUNT: 15.3 % (ref 11.5–14.5)
EST. AVERAGE GLUCOSE BLD GHB EST-MCNC: 97 MG/DL
GLOBULIN SER CALC-MCNC: 3.2 G/DL (ref 2–4)
GLUCOSE SERPL-MCNC: 114 MG/DL (ref 65–100)
HBA1C MFR BLD: 5 % (ref 4–5.6)
HCT VFR BLD AUTO: 34.2 % (ref 35–47)
HGB BLD-MCNC: 11.3 G/DL (ref 11.5–16)
LACTATE SERPL-SCNC: 1.8 MMOL/L (ref 0.4–2)
MAGNESIUM SERPL-MCNC: 1.9 MG/DL (ref 1.6–2.4)
MCH RBC QN AUTO: 32 PG (ref 26–34)
MCHC RBC AUTO-ENTMCNC: 33 G/DL (ref 30–36.5)
MCV RBC AUTO: 96.9 FL (ref 80–99)
NRBC # BLD: 0 K/UL (ref 0–0.01)
NRBC BLD-RTO: 0 PER 100 WBC
P-R INTERVAL, ECG05: 150 MS
PHOSPHATE SERPL-MCNC: 3 MG/DL (ref 2.6–4.7)
PLATELET # BLD AUTO: 38 K/UL (ref 150–400)
PMV BLD AUTO: 11.4 FL (ref 8.9–12.9)
POTASSIUM SERPL-SCNC: 3.7 MMOL/L (ref 3.5–5.1)
PROCALCITONIN SERPL-MCNC: 1.4 NG/ML
PROT SERPL-MCNC: 7 G/DL (ref 6.4–8.2)
Q-T INTERVAL, ECG07: 418 MS
QRS DURATION, ECG06: 80 MS
QTC CALCULATION (BEZET), ECG08: 476 MS
RBC # BLD AUTO: 3.53 M/UL (ref 3.8–5.2)
SODIUM SERPL-SCNC: 139 MMOL/L (ref 136–145)
VENTRICULAR RATE, ECG03: 78 BPM
WBC # BLD AUTO: 9.6 K/UL (ref 3.6–11)

## 2023-05-03 PROCEDURE — C1729 CATH, DRAINAGE: HCPCS

## 2023-05-03 PROCEDURE — 97161 PT EVAL LOW COMPLEX 20 MIN: CPT

## 2023-05-03 PROCEDURE — 74011250636 HC RX REV CODE- 250/636: Performed by: INTERNAL MEDICINE

## 2023-05-03 PROCEDURE — 84100 ASSAY OF PHOSPHORUS: CPT

## 2023-05-03 PROCEDURE — 74011636637 HC RX REV CODE- 636/637: Performed by: INTERNAL MEDICINE

## 2023-05-03 PROCEDURE — 74011250637 HC RX REV CODE- 250/637: Performed by: INTERNAL MEDICINE

## 2023-05-03 PROCEDURE — 85027 COMPLETE CBC AUTOMATED: CPT

## 2023-05-03 PROCEDURE — 32555 ASPIRATE PLEURA W/ IMAGING: CPT

## 2023-05-03 PROCEDURE — 97116 GAIT TRAINING THERAPY: CPT

## 2023-05-03 PROCEDURE — 84145 PROCALCITONIN (PCT): CPT

## 2023-05-03 PROCEDURE — 83605 ASSAY OF LACTIC ACID: CPT

## 2023-05-03 PROCEDURE — 97530 THERAPEUTIC ACTIVITIES: CPT

## 2023-05-03 PROCEDURE — 80048 BASIC METABOLIC PNL TOTAL CA: CPT

## 2023-05-03 PROCEDURE — 71045 X-RAY EXAM CHEST 1 VIEW: CPT

## 2023-05-03 PROCEDURE — 80076 HEPATIC FUNCTION PANEL: CPT

## 2023-05-03 PROCEDURE — 36415 COLL VENOUS BLD VENIPUNCTURE: CPT

## 2023-05-03 PROCEDURE — 74011000250 HC RX REV CODE- 250: Performed by: INTERNAL MEDICINE

## 2023-05-03 PROCEDURE — 93005 ELECTROCARDIOGRAM TRACING: CPT

## 2023-05-03 PROCEDURE — 65610000006 HC RM INTENSIVE CARE

## 2023-05-03 PROCEDURE — 74011000250 HC RX REV CODE- 250: Performed by: PHYSICIAN ASSISTANT

## 2023-05-03 PROCEDURE — 83735 ASSAY OF MAGNESIUM: CPT

## 2023-05-03 PROCEDURE — 74011000258 HC RX REV CODE- 258: Performed by: INTERNAL MEDICINE

## 2023-05-03 PROCEDURE — 82140 ASSAY OF AMMONIA: CPT

## 2023-05-03 PROCEDURE — 83036 HEMOGLOBIN GLYCOSYLATED A1C: CPT

## 2023-05-03 PROCEDURE — 77010033678 HC OXYGEN DAILY

## 2023-05-03 PROCEDURE — 9990 CHARGE CONVERSION

## 2023-05-03 PROCEDURE — 0W993ZZ DRAINAGE OF RIGHT PLEURAL CAVITY, PERCUTANEOUS APPROACH: ICD-10-PCS | Performed by: RADIOLOGY

## 2023-05-03 PROCEDURE — 93306 TTE W/DOPPLER COMPLETE: CPT | Performed by: STUDENT IN AN ORGANIZED HEALTH CARE EDUCATION/TRAINING PROGRAM

## 2023-05-03 RX ORDER — DILTIAZEM HYDROCHLORIDE 5 MG/ML
10 INJECTION INTRAVENOUS ONCE
Status: DISPENSED | OUTPATIENT
Start: 2023-05-03 | End: 2023-05-04

## 2023-05-03 RX ORDER — SODIUM CHLORIDE, SODIUM LACTATE, POTASSIUM CHLORIDE, CALCIUM CHLORIDE 600; 310; 30; 20 MG/100ML; MG/100ML; MG/100ML; MG/100ML
50 INJECTION, SOLUTION INTRAVENOUS CONTINUOUS
Status: DISCONTINUED | OUTPATIENT
Start: 2023-05-03 | End: 2023-05-04

## 2023-05-03 RX ORDER — LIDOCAINE HYDROCHLORIDE 10 MG/ML
10 INJECTION INFILTRATION; PERINEURAL
Status: COMPLETED | OUTPATIENT
Start: 2023-05-03 | End: 2023-05-03

## 2023-05-03 RX ADMIN — LIDOCAINE HYDROCHLORIDE 10 ML: 10 INJECTION, SOLUTION INFILTRATION; PERINEURAL at 15:30

## 2023-05-03 RX ADMIN — LACTULOSE 15 ML: 20 SOLUTION ORAL at 08:13

## 2023-05-03 RX ADMIN — SODIUM CHLORIDE, PRESERVATIVE FREE 10 ML: 5 INJECTION INTRAVENOUS at 06:19

## 2023-05-03 RX ADMIN — AMIODARONE HYDROCHLORIDE 1 MG/MIN: 50 INJECTION, SOLUTION INTRAVENOUS at 18:15

## 2023-05-03 RX ADMIN — ACETAMINOPHEN 650 MG: 325 TABLET ORAL at 08:13

## 2023-05-03 RX ADMIN — ENOXAPARIN SODIUM 40 MG: 100 INJECTION SUBCUTANEOUS at 08:13

## 2023-05-03 RX ADMIN — VALACYCLOVIR HYDROCHLORIDE 500 MG: 500 TABLET, FILM COATED ORAL at 08:13

## 2023-05-03 RX ADMIN — SODIUM CHLORIDE, PRESERVATIVE FREE 10 ML: 5 INJECTION INTRAVENOUS at 13:01

## 2023-05-03 RX ADMIN — MIDODRINE HYDROCHLORIDE 5 MG: 5 TABLET ORAL at 08:13

## 2023-05-03 RX ADMIN — PREDNISONE 5 MG: 5 TABLET ORAL at 08:13

## 2023-05-03 RX ADMIN — FUROSEMIDE 40 MG: 40 TABLET ORAL at 08:13

## 2023-05-03 RX ADMIN — LACTULOSE 15 ML: 20 SOLUTION ORAL at 17:31

## 2023-05-03 RX ADMIN — SODIUM CHLORIDE, POTASSIUM CHLORIDE, SODIUM LACTATE AND CALCIUM CHLORIDE 50 ML/HR: 600; 310; 30; 20 INJECTION, SOLUTION INTRAVENOUS at 18:06

## 2023-05-03 RX ADMIN — MIDODRINE HYDROCHLORIDE 5 MG: 5 TABLET ORAL at 11:39

## 2023-05-03 RX ADMIN — NADOLOL 20 MG: 40 TABLET ORAL at 11:39

## 2023-05-03 RX ADMIN — MIDODRINE HYDROCHLORIDE 5 MG: 5 TABLET ORAL at 17:31

## 2023-05-03 RX ADMIN — DEXTROSE MONOHYDRATE 150 MG: 5 INJECTION, SOLUTION INTRAVENOUS at 18:00

## 2023-05-04 ENCOUNTER — APPOINTMENT (OUTPATIENT)
Dept: GENERAL RADIOLOGY | Age: 73
End: 2023-05-04
Attending: INTERNAL MEDICINE
Payer: MEDICARE

## 2023-05-04 DIAGNOSIS — I48.91 ATRIAL FIBRILLATION WITH RAPID VENTRICULAR RESPONSE (HCC): Primary | ICD-10-CM

## 2023-05-04 LAB
ALBUMIN SERPL-MCNC: 2.9 G/DL (ref 3.5–5)
ALBUMIN/GLOB SERPL: 0.9 (ref 1.1–2.2)
ALP SERPL-CCNC: 76 U/L (ref 45–117)
ALT SERPL-CCNC: 28 U/L (ref 12–78)
AMMONIA PLAS-SCNC: 55 UMOL/L
ANION GAP SERPL CALC-SCNC: 3 MMOL/L (ref 5–15)
AST SERPL-CCNC: 35 U/L (ref 15–37)
ATRIAL RATE: 138 BPM
ATRIAL RATE: 75 BPM
BILIRUB SERPL-MCNC: 3.2 MG/DL (ref 0.2–1)
BUN SERPL-MCNC: 15 MG/DL (ref 6–20)
BUN/CREAT SERPL: 24 (ref 12–20)
CALCIUM SERPL-MCNC: 8.9 MG/DL (ref 8.5–10.1)
CALCULATED P AXIS, ECG09: 18 DEGREES
CALCULATED R AXIS, ECG10: -10 DEGREES
CALCULATED R AXIS, ECG10: -2 DEGREES
CALCULATED T AXIS, ECG11: 23 DEGREES
CALCULATED T AXIS, ECG11: 30 DEGREES
CHLORIDE SERPL-SCNC: 107 MMOL/L (ref 97–108)
CO2 SERPL-SCNC: 23 MMOL/L (ref 21–32)
CREAT SERPL-MCNC: 0.63 MG/DL (ref 0.55–1.02)
DIAGNOSIS, 93000: NORMAL
DIAGNOSIS, 93000: NORMAL
ERYTHROCYTE [DISTWIDTH] IN BLOOD BY AUTOMATED COUNT: 15.6 % (ref 11.5–14.5)
FIBRINOGEN PPP-MCNC: 355 MG/DL (ref 200–475)
GLOBULIN SER CALC-MCNC: 3.3 G/DL (ref 2–4)
GLUCOSE SERPL-MCNC: 120 MG/DL (ref 65–100)
HCT VFR BLD AUTO: 37.2 % (ref 35–47)
HGB BLD-MCNC: 12.5 G/DL (ref 11.5–16)
MAGNESIUM SERPL-MCNC: 2 MG/DL (ref 1.6–2.4)
MCH RBC QN AUTO: 32.1 PG (ref 26–34)
MCHC RBC AUTO-ENTMCNC: 33.6 G/DL (ref 30–36.5)
MCV RBC AUTO: 95.4 FL (ref 80–99)
NRBC # BLD: 0.02 K/UL (ref 0–0.01)
NRBC BLD-RTO: 0.2 PER 100 WBC
P-R INTERVAL, ECG05: 112 MS
P-R INTERVAL, ECG05: 166 MS
PERIPHERAL SMEAR,PSM: NORMAL
PHOSPHATE SERPL-MCNC: 1.4 MG/DL (ref 2.6–4.7)
PLATELET # BLD AUTO: 54 K/UL (ref 150–400)
POTASSIUM SERPL-SCNC: 3.5 MMOL/L (ref 3.5–5.1)
PROT SERPL-MCNC: 6.2 G/DL (ref 6.4–8.2)
Q-T INTERVAL, ECG07: 326 MS
Q-T INTERVAL, ECG07: 396 MS
QRS DURATION, ECG06: 82 MS
QRS DURATION, ECG06: 88 MS
QTC CALCULATION (BEZET), ECG08: 442 MS
QTC CALCULATION (BEZET), ECG08: 493 MS
RBC # BLD AUTO: 3.9 M/UL (ref 3.8–5.2)
SODIUM SERPL-SCNC: 133 MMOL/L (ref 136–145)
VENTRICULAR RATE, ECG03: 138 BPM
VENTRICULAR RATE, ECG03: 75 BPM
WBC # BLD AUTO: 9.9 K/UL (ref 3.6–11)

## 2023-05-04 PROCEDURE — 84100 ASSAY OF PHOSPHORUS: CPT

## 2023-05-04 PROCEDURE — 82140 ASSAY OF AMMONIA: CPT

## 2023-05-04 PROCEDURE — 83735 ASSAY OF MAGNESIUM: CPT

## 2023-05-04 PROCEDURE — 80053 COMPREHEN METABOLIC PANEL: CPT

## 2023-05-04 PROCEDURE — 74011250637 HC RX REV CODE- 250/637: Performed by: INTERNAL MEDICINE

## 2023-05-04 PROCEDURE — 85384 FIBRINOGEN ACTIVITY: CPT

## 2023-05-04 PROCEDURE — 74011000250 HC RX REV CODE- 250: Performed by: INTERNAL MEDICINE

## 2023-05-04 PROCEDURE — 99223 1ST HOSP IP/OBS HIGH 75: CPT | Performed by: INTERNAL MEDICINE

## 2023-05-04 PROCEDURE — 36415 COLL VENOUS BLD VENIPUNCTURE: CPT

## 2023-05-04 PROCEDURE — 74011000258 HC RX REV CODE- 258: Performed by: SPECIALIST

## 2023-05-04 PROCEDURE — 71045 X-RAY EXAM CHEST 1 VIEW: CPT

## 2023-05-04 PROCEDURE — 77010033678 HC OXYGEN DAILY

## 2023-05-04 PROCEDURE — 2709999900 HC NON-CHARGEABLE SUPPLY

## 2023-05-04 PROCEDURE — 97535 SELF CARE MNGMENT TRAINING: CPT

## 2023-05-04 PROCEDURE — 74011000258 HC RX REV CODE- 258: Performed by: INTERNAL MEDICINE

## 2023-05-04 PROCEDURE — 65270000029 HC RM PRIVATE

## 2023-05-04 PROCEDURE — 9990 CHARGE CONVERSION

## 2023-05-04 PROCEDURE — 85027 COMPLETE CBC AUTOMATED: CPT

## 2023-05-04 PROCEDURE — 97116 GAIT TRAINING THERAPY: CPT

## 2023-05-04 PROCEDURE — 74011250636 HC RX REV CODE- 250/636: Performed by: INTERNAL MEDICINE

## 2023-05-04 PROCEDURE — 74011250636 HC RX REV CODE- 250/636: Performed by: SPECIALIST

## 2023-05-04 PROCEDURE — 65270000046 HC RM TELEMETRY

## 2023-05-04 PROCEDURE — 97165 OT EVAL LOW COMPLEX 30 MIN: CPT

## 2023-05-04 PROCEDURE — 74011636637 HC RX REV CODE- 636/637: Performed by: INTERNAL MEDICINE

## 2023-05-04 RX ORDER — POTASSIUM CHLORIDE 750 MG/1
20 TABLET, FILM COATED, EXTENDED RELEASE ORAL
Status: COMPLETED | OUTPATIENT
Start: 2023-05-04 | End: 2023-05-04

## 2023-05-04 RX ORDER — AMIODARONE HYDROCHLORIDE 200 MG/1
200 TABLET ORAL DAILY
Status: DISCONTINUED | OUTPATIENT
Start: 2023-05-05 | End: 2023-05-06 | Stop reason: HOSPADM

## 2023-05-04 RX ADMIN — MIDODRINE HYDROCHLORIDE 5 MG: 5 TABLET ORAL at 08:05

## 2023-05-04 RX ADMIN — PREDNISONE 5 MG: 5 TABLET ORAL at 08:05

## 2023-05-04 RX ADMIN — FUROSEMIDE 40 MG: 40 TABLET ORAL at 08:05

## 2023-05-04 RX ADMIN — SODIUM CHLORIDE, PRESERVATIVE FREE 10 ML: 5 INJECTION INTRAVENOUS at 00:11

## 2023-05-04 RX ADMIN — POTASSIUM CHLORIDE 20 MEQ: 750 TABLET, FILM COATED, EXTENDED RELEASE ORAL at 08:40

## 2023-05-04 RX ADMIN — MIDODRINE HYDROCHLORIDE 5 MG: 5 TABLET ORAL at 11:08

## 2023-05-04 RX ADMIN — SODIUM CHLORIDE, PRESERVATIVE FREE 10 ML: 5 INJECTION INTRAVENOUS at 21:30

## 2023-05-04 RX ADMIN — AMIODARONE HYDROCHLORIDE 0.5 MG/MIN: 50 INJECTION, SOLUTION INTRAVENOUS at 03:42

## 2023-05-04 RX ADMIN — LACTULOSE 15 ML: 20 SOLUTION ORAL at 08:05

## 2023-05-04 RX ADMIN — MIDODRINE HYDROCHLORIDE 5 MG: 5 TABLET ORAL at 17:17

## 2023-05-04 RX ADMIN — SODIUM CHLORIDE, PRESERVATIVE FREE 10 ML: 5 INJECTION INTRAVENOUS at 08:05

## 2023-05-04 RX ADMIN — SODIUM PHOSPHATE, MONOBASIC, MONOHYDRATE AND SODIUM PHOSPHATE, DIBASIC, ANHYDROUS: 142; 276 INJECTION, SOLUTION INTRAVENOUS at 09:28

## 2023-05-04 RX ADMIN — LACTULOSE 15 ML: 20 SOLUTION ORAL at 17:18

## 2023-05-04 RX ADMIN — AMIODARONE HYDROCHLORIDE 1 MG/MIN: 50 INJECTION, SOLUTION INTRAVENOUS at 21:24

## 2023-05-04 RX ADMIN — SODIUM CHLORIDE, PRESERVATIVE FREE 10 ML: 5 INJECTION INTRAVENOUS at 17:18

## 2023-05-05 VITALS
DIASTOLIC BLOOD PRESSURE: 77 MMHG | HEART RATE: 100 BPM | HEIGHT: 65 IN | BODY MASS INDEX: 24.35 KG/M2 | WEIGHT: 146.16 LBS | OXYGEN SATURATION: 90 % | RESPIRATION RATE: 20 BRPM | SYSTOLIC BLOOD PRESSURE: 136 MMHG | TEMPERATURE: 98.3 F

## 2023-05-05 LAB
ANION GAP SERPL CALC-SCNC: 3 MMOL/L (ref 5–15)
BUN SERPL-MCNC: 10 MG/DL (ref 6–20)
BUN/CREAT SERPL: 18 (ref 12–20)
CALCIUM SERPL-MCNC: 8.6 MG/DL (ref 8.5–10.1)
CHLORIDE SERPL-SCNC: 107 MMOL/L (ref 97–108)
CO2 SERPL-SCNC: 25 MMOL/L (ref 21–32)
CREAT SERPL-MCNC: 0.56 MG/DL (ref 0.55–1.02)
ERYTHROCYTE [DISTWIDTH] IN BLOOD BY AUTOMATED COUNT: 15.2 % (ref 11.5–14.5)
FOLATE SERPL-MCNC: 16.9 NG/ML (ref 5–21)
GLUCOSE SERPL-MCNC: 114 MG/DL (ref 65–100)
HCT VFR BLD AUTO: 39.7 % (ref 35–47)
HGB BLD-MCNC: 13.5 G/DL (ref 11.5–16)
MAGNESIUM SERPL-MCNC: 2 MG/DL (ref 1.6–2.4)
MCH RBC QN AUTO: 31.6 PG (ref 26–34)
MCHC RBC AUTO-ENTMCNC: 34 G/DL (ref 30–36.5)
MCV RBC AUTO: 93 FL (ref 80–99)
NRBC # BLD: 0 K/UL (ref 0–0.01)
NRBC BLD-RTO: 0 PER 100 WBC
PHOSPHATE SERPL-MCNC: 1.7 MG/DL (ref 2.6–4.7)
PLATELET # BLD AUTO: 84 K/UL (ref 150–400)
PMV BLD AUTO: 10.2 FL (ref 8.9–12.9)
POTASSIUM SERPL-SCNC: 3.2 MMOL/L (ref 3.5–5.1)
RBC # BLD AUTO: 4.27 M/UL (ref 3.8–5.2)
SODIUM SERPL-SCNC: 135 MMOL/L (ref 136–145)
VIT B12 SERPL-MCNC: 587 PG/ML (ref 193–986)
WBC # BLD AUTO: 9.7 K/UL (ref 3.6–11)

## 2023-05-05 PROCEDURE — 74011000250 HC RX REV CODE- 250: Performed by: INTERNAL MEDICINE

## 2023-05-05 PROCEDURE — 74011250636 HC RX REV CODE- 250/636: Performed by: SPECIALIST

## 2023-05-05 PROCEDURE — 84100 ASSAY OF PHOSPHORUS: CPT

## 2023-05-05 PROCEDURE — 82607 VITAMIN B-12: CPT

## 2023-05-05 PROCEDURE — 74011000258 HC RX REV CODE- 258: Performed by: SPECIALIST

## 2023-05-05 PROCEDURE — 97116 GAIT TRAINING THERAPY: CPT

## 2023-05-05 PROCEDURE — 83735 ASSAY OF MAGNESIUM: CPT

## 2023-05-05 PROCEDURE — 74011250637 HC RX REV CODE- 250/637: Performed by: HOSPITALIST

## 2023-05-05 PROCEDURE — 80048 BASIC METABOLIC PNL TOTAL CA: CPT

## 2023-05-05 PROCEDURE — 74011250637 HC RX REV CODE- 250/637: Performed by: SPECIALIST

## 2023-05-05 PROCEDURE — 2709999900 HC NON-CHARGEABLE SUPPLY

## 2023-05-05 PROCEDURE — 85027 COMPLETE CBC AUTOMATED: CPT

## 2023-05-05 PROCEDURE — 74011636637 HC RX REV CODE- 636/637: Performed by: INTERNAL MEDICINE

## 2023-05-05 PROCEDURE — 36415 COLL VENOUS BLD VENIPUNCTURE: CPT

## 2023-05-05 PROCEDURE — 74011250637 HC RX REV CODE- 250/637: Performed by: INTERNAL MEDICINE

## 2023-05-05 PROCEDURE — 99233 SBSQ HOSP IP/OBS HIGH 50: CPT | Performed by: INTERNAL MEDICINE

## 2023-05-05 PROCEDURE — 65270000046 HC RM TELEMETRY

## 2023-05-05 PROCEDURE — 82746 ASSAY OF FOLIC ACID SERUM: CPT

## 2023-05-05 PROCEDURE — 9990 CHARGE CONVERSION

## 2023-05-05 RX ORDER — IPRATROPIUM BROMIDE AND ALBUTEROL SULFATE 2.5; .5 MG/3ML; MG/3ML
1 SOLUTION RESPIRATORY (INHALATION) EVERY 6 HOURS PRN
Status: DISCONTINUED | OUTPATIENT
Start: 2023-05-06 | End: 2023-05-07 | Stop reason: HOSPADM

## 2023-05-05 RX ORDER — MIDODRINE HYDROCHLORIDE 5 MG/1
5 TABLET ORAL
Status: DISCONTINUED | OUTPATIENT
Start: 2023-05-06 | End: 2023-05-07 | Stop reason: HOSPADM

## 2023-05-05 RX ORDER — SODIUM,POTASSIUM PHOSPHATES 280-250MG
1 POWDER IN PACKET (EA) ORAL 4 TIMES DAILY
Status: COMPLETED | OUTPATIENT
Start: 2023-05-05 | End: 2023-05-05

## 2023-05-05 RX ORDER — BENZONATATE 100 MG/1
100 CAPSULE ORAL 3 TIMES DAILY PRN
Status: DISCONTINUED | OUTPATIENT
Start: 2023-05-06 | End: 2023-05-07 | Stop reason: HOSPADM

## 2023-05-05 RX ORDER — PREDNISONE 1 MG/1
5 TABLET ORAL DAILY
Status: DISCONTINUED | OUTPATIENT
Start: 2023-05-06 | End: 2023-05-07 | Stop reason: HOSPADM

## 2023-05-05 RX ORDER — SPIRONOLACTONE 100 MG/1
100 TABLET, FILM COATED ORAL DAILY
Status: DISCONTINUED | OUTPATIENT
Start: 2023-05-06 | End: 2023-05-07 | Stop reason: HOSPADM

## 2023-05-05 RX ORDER — NADOLOL 40 MG/1
20 TABLET ORAL DAILY
Status: DISCONTINUED | OUTPATIENT
Start: 2023-05-06 | End: 2023-05-07 | Stop reason: HOSPADM

## 2023-05-05 RX ORDER — LACTULOSE 10 G/15ML
10 SOLUTION ORAL 2 TIMES DAILY
Status: DISCONTINUED | OUTPATIENT
Start: 2023-05-06 | End: 2023-05-07 | Stop reason: HOSPADM

## 2023-05-05 RX ORDER — POLYETHYLENE GLYCOL 3350 17 G/17G
17 POWDER, FOR SOLUTION ORAL DAILY PRN
Status: DISCONTINUED | OUTPATIENT
Start: 2023-05-06 | End: 2023-05-07 | Stop reason: HOSPADM

## 2023-05-05 RX ORDER — VALACYCLOVIR HYDROCHLORIDE 500 MG/1
500 TABLET, FILM COATED ORAL EVERY OTHER DAY
Status: DISCONTINUED | OUTPATIENT
Start: 2023-05-07 | End: 2023-05-07 | Stop reason: HOSPADM

## 2023-05-05 RX ORDER — SODIUM CHLORIDE 0.9 % (FLUSH) 0.9 %
5-40 SYRINGE (ML) INJECTION EVERY 8 HOURS
Status: DISCONTINUED | OUTPATIENT
Start: 2023-05-06 | End: 2023-05-07 | Stop reason: HOSPADM

## 2023-05-05 RX ORDER — GUAIFENESIN/DEXTROMETHORPHAN 100-10MG/5
5 SYRUP ORAL EVERY 6 HOURS PRN
Status: DISCONTINUED | OUTPATIENT
Start: 2023-05-06 | End: 2023-05-07 | Stop reason: HOSPADM

## 2023-05-05 RX ORDER — DIGOXIN 0.25 MG/ML
250 INJECTION INTRAMUSCULAR; INTRAVENOUS EVERY 6 HOURS
Status: COMPLETED | OUTPATIENT
Start: 2023-05-05 | End: 2023-05-05

## 2023-05-05 RX ORDER — OXYCODONE HYDROCHLORIDE 5 MG/1
5 TABLET ORAL EVERY 4 HOURS PRN
Status: DISCONTINUED | OUTPATIENT
Start: 2023-05-06 | End: 2023-05-07 | Stop reason: HOSPADM

## 2023-05-05 RX ORDER — ONDANSETRON 4 MG/1
4 TABLET, ORALLY DISINTEGRATING ORAL EVERY 8 HOURS PRN
Status: DISCONTINUED | OUTPATIENT
Start: 2023-05-06 | End: 2023-05-07 | Stop reason: HOSPADM

## 2023-05-05 RX ORDER — AMIODARONE HYDROCHLORIDE 200 MG/1
200 TABLET ORAL DAILY
Status: DISCONTINUED | OUTPATIENT
Start: 2023-05-06 | End: 2023-05-07

## 2023-05-05 RX ORDER — HYDROMORPHONE HYDROCHLORIDE 1 MG/ML
0.5 INJECTION, SOLUTION INTRAMUSCULAR; INTRAVENOUS; SUBCUTANEOUS EVERY 4 HOURS PRN
Status: DISCONTINUED | OUTPATIENT
Start: 2023-05-06 | End: 2023-05-07 | Stop reason: HOSPADM

## 2023-05-05 RX ORDER — POTASSIUM CHLORIDE 750 MG/1
40 TABLET, FILM COATED, EXTENDED RELEASE ORAL EVERY 4 HOURS
Status: COMPLETED | OUTPATIENT
Start: 2023-05-05 | End: 2023-05-05

## 2023-05-05 RX ORDER — SODIUM CHLORIDE 0.9 % (FLUSH) 0.9 %
5-40 SYRINGE (ML) INJECTION PRN
Status: DISCONTINUED | OUTPATIENT
Start: 2023-05-06 | End: 2023-05-07 | Stop reason: HOSPADM

## 2023-05-05 RX ORDER — ACETAMINOPHEN 325 MG/1
650 TABLET ORAL EVERY 6 HOURS PRN
Status: DISCONTINUED | OUTPATIENT
Start: 2023-05-06 | End: 2023-05-07 | Stop reason: HOSPADM

## 2023-05-05 RX ORDER — DIGOXIN 125 MCG
0.12 TABLET ORAL DAILY
Status: DISCONTINUED | OUTPATIENT
Start: 2023-05-06 | End: 2023-05-06 | Stop reason: HOSPADM

## 2023-05-05 RX ORDER — ONDANSETRON 2 MG/ML
4 INJECTION INTRAMUSCULAR; INTRAVENOUS EVERY 6 HOURS PRN
Status: DISCONTINUED | OUTPATIENT
Start: 2023-05-06 | End: 2023-05-07 | Stop reason: HOSPADM

## 2023-05-05 RX ORDER — FUROSEMIDE 40 MG/1
40 TABLET ORAL DAILY
Status: DISCONTINUED | OUTPATIENT
Start: 2023-05-06 | End: 2023-05-07 | Stop reason: HOSPADM

## 2023-05-05 RX ORDER — ACETAMINOPHEN 650 MG/1
650 SUPPOSITORY RECTAL EVERY 6 HOURS PRN
Status: DISCONTINUED | OUTPATIENT
Start: 2023-05-06 | End: 2023-05-07 | Stop reason: HOSPADM

## 2023-05-05 RX ORDER — DIGOXIN 125 MCG
125 TABLET ORAL DAILY
Status: DISCONTINUED | OUTPATIENT
Start: 2023-05-06 | End: 2023-05-07

## 2023-05-05 RX ADMIN — DIGOXIN 250 MCG: 250 INJECTION, SOLUTION INTRAMUSCULAR; INTRAVENOUS at 21:17

## 2023-05-05 RX ADMIN — SODIUM CHLORIDE, PRESERVATIVE FREE 10 ML: 5 INJECTION INTRAVENOUS at 06:39

## 2023-05-05 RX ADMIN — VALACYCLOVIR HYDROCHLORIDE 500 MG: 500 TABLET, FILM COATED ORAL at 08:03

## 2023-05-05 RX ADMIN — MIDODRINE HYDROCHLORIDE 5 MG: 5 TABLET ORAL at 11:07

## 2023-05-05 RX ADMIN — AMIODARONE HYDROCHLORIDE 150 MG: 50 INJECTION, SOLUTION INTRAVENOUS at 07:58

## 2023-05-05 RX ADMIN — LACTULOSE 15 ML: 20 SOLUTION ORAL at 08:02

## 2023-05-05 RX ADMIN — PREDNISONE 5 MG: 5 TABLET ORAL at 08:03

## 2023-05-05 RX ADMIN — LACTULOSE 15 ML: 20 SOLUTION ORAL at 17:57

## 2023-05-05 RX ADMIN — Medication 1 PACKET: at 17:57

## 2023-05-05 RX ADMIN — AMIODARONE HYDROCHLORIDE 0.5 MG/MIN: 50 INJECTION, SOLUTION INTRAVENOUS at 12:37

## 2023-05-05 RX ADMIN — Medication 1 PACKET: at 11:05

## 2023-05-05 RX ADMIN — Medication 1 PACKET: at 12:37

## 2023-05-05 RX ADMIN — POTASSIUM CHLORIDE 40 MEQ: 750 TABLET, FILM COATED, EXTENDED RELEASE ORAL at 11:06

## 2023-05-05 RX ADMIN — DIGOXIN 250 MCG: 250 INJECTION, SOLUTION INTRAMUSCULAR; INTRAVENOUS at 13:32

## 2023-05-05 RX ADMIN — Medication 1 PACKET: at 21:17

## 2023-05-05 RX ADMIN — AMIODARONE HYDROCHLORIDE 200 MG: 200 TABLET ORAL at 09:53

## 2023-05-05 RX ADMIN — DIGOXIN 250 MCG: 250 INJECTION, SOLUTION INTRAMUSCULAR; INTRAVENOUS at 08:02

## 2023-05-05 RX ADMIN — SODIUM CHLORIDE, PRESERVATIVE FREE 10 ML: 5 INJECTION INTRAVENOUS at 21:17

## 2023-05-05 RX ADMIN — MIDODRINE HYDROCHLORIDE 5 MG: 5 TABLET ORAL at 08:03

## 2023-05-05 RX ADMIN — SODIUM CHLORIDE, PRESERVATIVE FREE 10 ML: 5 INJECTION INTRAVENOUS at 13:32

## 2023-05-05 RX ADMIN — FUROSEMIDE 40 MG: 40 TABLET ORAL at 08:03

## 2023-05-05 RX ADMIN — POTASSIUM CHLORIDE 40 MEQ: 750 TABLET, FILM COATED, EXTENDED RELEASE ORAL at 07:57

## 2023-05-06 PROBLEM — I48.91 ATRIAL FIBRILLATION WITH RAPID VENTRICULAR RESPONSE (HCC): Status: ACTIVE | Noted: 2023-05-06

## 2023-05-06 PROBLEM — D69.6 THROMBOCYTOPENIA (HCC): Status: ACTIVE | Noted: 2023-05-06

## 2023-05-06 PROBLEM — K74.60 HEPATIC CIRRHOSIS (HCC): Status: ACTIVE | Noted: 2023-01-31

## 2023-05-06 PROBLEM — Z51.81 ANTICOAGULATION MANAGEMENT ENCOUNTER: Status: ACTIVE | Noted: 2023-05-06

## 2023-05-06 PROBLEM — Z79.01 ANTICOAGULATION MANAGEMENT ENCOUNTER: Status: ACTIVE | Noted: 2023-05-06

## 2023-05-06 LAB
ANION GAP SERPL CALC-SCNC: 6 MMOL/L (ref 5–15)
BACTERIA SPEC CULT: NORMAL
BASOPHILS # BLD: 0 K/UL (ref 0–0.1)
BASOPHILS NFR BLD: 0 % (ref 0–1)
BUN SERPL-MCNC: 8 MG/DL (ref 6–20)
BUN/CREAT SERPL: 13 (ref 12–20)
CALCIUM SERPL-MCNC: 8.6 MG/DL (ref 8.5–10.1)
CHLORIDE SERPL-SCNC: 105 MMOL/L (ref 97–108)
CO2 SERPL-SCNC: 24 MMOL/L (ref 21–32)
CREAT SERPL-MCNC: 0.6 MG/DL (ref 0.55–1.02)
DIFFERENTIAL METHOD BLD: ABNORMAL
EOSINOPHIL # BLD: 0 K/UL (ref 0–0.4)
EOSINOPHIL NFR BLD: 0 % (ref 0–7)
ERYTHROCYTE [DISTWIDTH] IN BLOOD BY AUTOMATED COUNT: 15.4 % (ref 11.5–14.5)
GLUCOSE SERPL-MCNC: 132 MG/DL (ref 65–100)
GRAM STN SPEC: NORMAL
GRAM STN SPEC: NORMAL
HCT VFR BLD AUTO: 41.4 % (ref 35–47)
HGB BLD-MCNC: 13.9 G/DL (ref 11.5–16)
IMM GRANULOCYTES # BLD AUTO: 0.1 K/UL (ref 0–0.04)
IMM GRANULOCYTES NFR BLD AUTO: 1 % (ref 0–0.5)
LYMPHOCYTES # BLD: 1.4 K/UL (ref 0.8–3.5)
LYMPHOCYTES NFR BLD: 11 % (ref 12–49)
MAGNESIUM SERPL-MCNC: 1.8 MG/DL (ref 1.6–2.4)
MCH RBC QN AUTO: 31.4 PG (ref 26–34)
MCHC RBC AUTO-ENTMCNC: 33.6 G/DL (ref 30–36.5)
MCV RBC AUTO: 93.5 FL (ref 80–99)
MONOCYTES # BLD: 1.1 K/UL (ref 0–1)
MONOCYTES NFR BLD: 9 % (ref 5–13)
NEUTS SEG # BLD: 9.8 K/UL (ref 1.8–8)
NEUTS SEG NFR BLD: 78 % (ref 32–75)
NRBC # BLD: 0 K/UL (ref 0–0.01)
NRBC BLD-RTO: 0 PER 100 WBC
PLATELET # BLD AUTO: 96 K/UL (ref 150–400)
PMV BLD AUTO: 10.8 FL (ref 8.9–12.9)
POTASSIUM SERPL-SCNC: 4 MMOL/L (ref 3.5–5.1)
RBC # BLD AUTO: 4.43 M/UL (ref 3.8–5.2)
SERVICE CMNT-IMP: NORMAL
SERVICE CMNT-IMP: NORMAL
SODIUM SERPL-SCNC: 135 MMOL/L (ref 136–145)
WBC # BLD AUTO: 12.5 K/UL (ref 3.6–11)

## 2023-05-06 PROCEDURE — 6370000000 HC RX 637 (ALT 250 FOR IP): Performed by: INTERNAL MEDICINE

## 2023-05-06 PROCEDURE — 6370000000 HC RX 637 (ALT 250 FOR IP): Performed by: HOSPITALIST

## 2023-05-06 PROCEDURE — 6360000002 HC RX W HCPCS: Performed by: INTERNAL MEDICINE

## 2023-05-06 PROCEDURE — 94761 N-INVAS EAR/PLS OXIMETRY MLT: CPT

## 2023-05-06 PROCEDURE — 83735 ASSAY OF MAGNESIUM: CPT

## 2023-05-06 PROCEDURE — 2580000003 HC RX 258: Performed by: HOSPITALIST

## 2023-05-06 PROCEDURE — 85025 COMPLETE CBC W/AUTO DIFF WBC: CPT

## 2023-05-06 PROCEDURE — 6360000002 HC RX W HCPCS: Performed by: HOSPITALIST

## 2023-05-06 PROCEDURE — 36600 WITHDRAWAL OF ARTERIAL BLOOD: CPT

## 2023-05-06 PROCEDURE — 1100000003 HC PRIVATE W/ TELEMETRY

## 2023-05-06 PROCEDURE — 2500000003 HC RX 250 WO HCPCS: Performed by: NURSE PRACTITIONER

## 2023-05-06 PROCEDURE — 99233 SBSQ HOSP IP/OBS HIGH 50: CPT | Performed by: INTERNAL MEDICINE

## 2023-05-06 PROCEDURE — 36415 COLL VENOUS BLD VENIPUNCTURE: CPT

## 2023-05-06 PROCEDURE — 80048 BASIC METABOLIC PNL TOTAL CA: CPT

## 2023-05-06 RX ORDER — M-VIT,TX,IRON,MINS/CALC/FOLIC 27MG-0.4MG
1 TABLET ORAL DAILY
Status: ON HOLD | COMMUNITY

## 2023-05-06 RX ORDER — METOPROLOL TARTRATE 5 MG/5ML
5 INJECTION INTRAVENOUS ONCE
Status: COMPLETED | OUTPATIENT
Start: 2023-05-06 | End: 2023-05-06

## 2023-05-06 RX ORDER — ENOXAPARIN SODIUM 100 MG/ML
1 INJECTION SUBCUTANEOUS 2 TIMES DAILY
Status: DISCONTINUED | OUTPATIENT
Start: 2023-05-06 | End: 2023-05-07

## 2023-05-06 RX ORDER — HEPARIN SODIUM 1000 [USP'U]/ML
60 INJECTION, SOLUTION INTRAVENOUS; SUBCUTANEOUS PRN
Status: DISCONTINUED | OUTPATIENT
Start: 2023-05-06 | End: 2023-05-07 | Stop reason: HOSPADM

## 2023-05-06 RX ORDER — HEPARIN SODIUM 10000 [USP'U]/100ML
5-30 INJECTION, SOLUTION INTRAVENOUS CONTINUOUS
Status: DISCONTINUED | OUTPATIENT
Start: 2023-05-06 | End: 2023-05-06

## 2023-05-06 RX ORDER — HEPARIN SODIUM 1000 [USP'U]/ML
30 INJECTION, SOLUTION INTRAVENOUS; SUBCUTANEOUS PRN
Status: DISCONTINUED | OUTPATIENT
Start: 2023-05-06 | End: 2023-05-07 | Stop reason: HOSPADM

## 2023-05-06 RX ADMIN — AMIODARONE HYDROCHLORIDE 0.5 MG/MIN: 50 INJECTION, SOLUTION INTRAVENOUS at 07:32

## 2023-05-06 RX ADMIN — FUROSEMIDE 40 MG: 40 TABLET ORAL at 09:40

## 2023-05-06 RX ADMIN — DIGOXIN 125 MCG: 125 TABLET ORAL at 09:40

## 2023-05-06 RX ADMIN — METOPROLOL TARTRATE 25 MG: 25 TABLET, FILM COATED ORAL at 18:09

## 2023-05-06 RX ADMIN — AMIODARONE HYDROCHLORIDE 200 MG: 200 TABLET ORAL at 09:40

## 2023-05-06 RX ADMIN — METOPROLOL TARTRATE 25 MG: 25 TABLET, FILM COATED ORAL at 23:39

## 2023-05-06 RX ADMIN — METOPROLOL TARTRATE 5 MG: 5 INJECTION, SOLUTION INTRAVENOUS at 03:55

## 2023-05-06 RX ADMIN — LACTULOSE 10 G: 20 SOLUTION ORAL at 20:57

## 2023-05-06 RX ADMIN — ENOXAPARIN SODIUM 60 MG: 100 INJECTION SUBCUTANEOUS at 11:23

## 2023-05-06 RX ADMIN — SODIUM CHLORIDE, PRESERVATIVE FREE 10 ML: 5 INJECTION INTRAVENOUS at 04:47

## 2023-05-06 RX ADMIN — ACETAMINOPHEN 650 MG: 325 TABLET ORAL at 03:55

## 2023-05-06 RX ADMIN — ENOXAPARIN SODIUM 60 MG: 100 INJECTION SUBCUTANEOUS at 20:57

## 2023-05-06 RX ADMIN — AMIODARONE HYDROCHLORIDE 0.5 MG/MIN: 50 INJECTION, SOLUTION INTRAVENOUS at 23:40

## 2023-05-06 RX ADMIN — ACETAMINOPHEN 650 MG: 325 TABLET ORAL at 20:57

## 2023-05-06 RX ADMIN — PREDNISONE 5 MG: 5 TABLET ORAL at 09:40

## 2023-05-06 RX ADMIN — ACETAMINOPHEN 650 MG: 325 TABLET ORAL at 15:33

## 2023-05-06 RX ADMIN — LACTULOSE 10 G: 20 SOLUTION ORAL at 09:40

## 2023-05-06 RX ADMIN — SODIUM CHLORIDE, PRESERVATIVE FREE 10 ML: 5 INJECTION INTRAVENOUS at 09:49

## 2023-05-06 RX ADMIN — SODIUM CHLORIDE, PRESERVATIVE FREE 10 ML: 5 INJECTION INTRAVENOUS at 16:20

## 2023-05-06 ASSESSMENT — PAIN SCALES - GENERAL
PAINLEVEL_OUTOF10: 7
PAINLEVEL_OUTOF10: 6
PAINLEVEL_OUTOF10: 4
PAINLEVEL_OUTOF10: 6
PAINLEVEL_OUTOF10: 3

## 2023-05-06 ASSESSMENT — PAIN DESCRIPTION - ORIENTATION
ORIENTATION: MID
ORIENTATION: MID

## 2023-05-06 ASSESSMENT — PAIN DESCRIPTION - ONSET: ONSET: GRADUAL

## 2023-05-06 ASSESSMENT — PAIN DESCRIPTION - LOCATION
LOCATION: BACK

## 2023-05-06 ASSESSMENT — PAIN DESCRIPTION - DESCRIPTORS
DESCRIPTORS: STABBING
DESCRIPTORS: ACHING

## 2023-05-06 ASSESSMENT — PAIN DESCRIPTION - FREQUENCY: FREQUENCY: CONTINUOUS

## 2023-05-06 ASSESSMENT — PAIN - FUNCTIONAL ASSESSMENT: PAIN_FUNCTIONAL_ASSESSMENT: ACTIVITIES ARE NOT PREVENTED

## 2023-05-06 ASSESSMENT — PAIN DESCRIPTION - PAIN TYPE: TYPE: ACUTE PAIN

## 2023-05-07 VITALS
TEMPERATURE: 97.8 F | HEIGHT: 65 IN | WEIGHT: 146.16 LBS | DIASTOLIC BLOOD PRESSURE: 93 MMHG | RESPIRATION RATE: 18 BRPM | OXYGEN SATURATION: 94 % | HEART RATE: 72 BPM | SYSTOLIC BLOOD PRESSURE: 138 MMHG | BODY MASS INDEX: 24.35 KG/M2

## 2023-05-07 LAB
CALCULATED R AXIS, ECG10: -8 DEGREES
CALCULATED T AXIS, ECG11: -18 DEGREES
DIAGNOSIS, 93000: NORMAL
Q-T INTERVAL, ECG07: 338 MS
QRS DURATION, ECG06: 82 MS
QTC CALCULATION (BEZET), ECG08: 497 MS
VENTRICULAR RATE, ECG03: 130 BPM

## 2023-05-07 PROCEDURE — 2580000003 HC RX 258: Performed by: HOSPITALIST

## 2023-05-07 PROCEDURE — 6370000000 HC RX 637 (ALT 250 FOR IP): Performed by: HOSPITALIST

## 2023-05-07 PROCEDURE — 6370000000 HC RX 637 (ALT 250 FOR IP): Performed by: INTERNAL MEDICINE

## 2023-05-07 PROCEDURE — 99233 SBSQ HOSP IP/OBS HIGH 50: CPT | Performed by: INTERNAL MEDICINE

## 2023-05-07 RX ORDER — FUROSEMIDE 40 MG/1
40 TABLET ORAL DAILY
Qty: 30 TABLET | Refills: 3 | Status: ON HOLD | OUTPATIENT
Start: 2023-05-08

## 2023-05-07 RX ORDER — SPIRONOLACTONE 100 MG/1
100 TABLET, FILM COATED ORAL DAILY
Qty: 30 TABLET | Refills: 3 | Status: ON HOLD | OUTPATIENT
Start: 2023-05-07

## 2023-05-07 RX ADMIN — LACTULOSE 10 G: 20 SOLUTION ORAL at 09:51

## 2023-05-07 RX ADMIN — FUROSEMIDE 40 MG: 40 TABLET ORAL at 09:52

## 2023-05-07 RX ADMIN — PREDNISONE 5 MG: 5 TABLET ORAL at 09:52

## 2023-05-07 RX ADMIN — SODIUM CHLORIDE, PRESERVATIVE FREE 10 ML: 5 INJECTION INTRAVENOUS at 09:54

## 2023-05-07 RX ADMIN — VALACYCLOVIR HYDROCHLORIDE 500 MG: 500 TABLET, FILM COATED ORAL at 09:52

## 2023-05-07 RX ADMIN — METOPROLOL TARTRATE 25 MG: 25 TABLET, FILM COATED ORAL at 06:45

## 2023-05-07 RX ADMIN — ACETAMINOPHEN 650 MG: 325 TABLET ORAL at 04:38

## 2023-05-07 RX ADMIN — MIDODRINE HYDROCHLORIDE 5 MG: 5 TABLET ORAL at 12:07

## 2023-05-07 RX ADMIN — SODIUM CHLORIDE, PRESERVATIVE FREE 10 ML: 5 INJECTION INTRAVENOUS at 00:51

## 2023-05-07 RX ADMIN — NADOLOL 20 MG: 40 TABLET ORAL at 10:28

## 2023-05-07 ASSESSMENT — PAIN SCALES - GENERAL
PAINLEVEL_OUTOF10: 3
PAINLEVEL_OUTOF10: 5
PAINLEVEL_OUTOF10: 2

## 2023-05-07 ASSESSMENT — PAIN DESCRIPTION - ORIENTATION: ORIENTATION: UPPER

## 2023-05-07 ASSESSMENT — PAIN DESCRIPTION - DESCRIPTORS
DESCRIPTORS: ACHING
DESCRIPTORS: ACHING

## 2023-05-07 ASSESSMENT — PAIN DESCRIPTION - FREQUENCY: FREQUENCY: INTERMITTENT

## 2023-05-07 ASSESSMENT — PAIN DESCRIPTION - LOCATION
LOCATION: BACK
LOCATION: BACK

## 2023-05-07 ASSESSMENT — PAIN DESCRIPTION - PAIN TYPE: TYPE: ACUTE PAIN

## 2023-05-09 ENCOUNTER — CLINICAL DOCUMENTATION (OUTPATIENT)
Age: 73
End: 2023-05-09

## 2023-05-09 ENCOUNTER — PATIENT MESSAGE (OUTPATIENT)
Age: 73
End: 2023-05-09

## 2023-05-09 DIAGNOSIS — K74.60 HEPATIC CIRRHOSIS, UNSPECIFIED HEPATIC CIRRHOSIS TYPE, UNSPECIFIED WHETHER ASCITES PRESENT (HCC): Primary | ICD-10-CM

## 2023-05-09 PROCEDURE — 43235 EGD DIAGNOSTIC BRUSH WASH: CPT | Performed by: NURSE PRACTITIONER

## 2023-05-11 ENCOUNTER — APPOINTMENT (OUTPATIENT)
Facility: HOSPITAL | Age: 73
DRG: 433 | End: 2023-05-11
Payer: MEDICARE

## 2023-05-11 ENCOUNTER — HOSPITAL ENCOUNTER (INPATIENT)
Facility: HOSPITAL | Age: 73
LOS: 4 days | Discharge: HOME | DRG: 433 | End: 2023-05-15
Attending: EMERGENCY MEDICINE | Admitting: HOSPITALIST
Payer: MEDICARE

## 2023-05-11 DIAGNOSIS — K76.9 PLEURAL EFFUSION ASSOCIATED WITH HEPATIC DISORDER: ICD-10-CM

## 2023-05-11 DIAGNOSIS — J91.8 PLEURAL EFFUSION ASSOCIATED WITH HEPATIC DISORDER: ICD-10-CM

## 2023-05-11 DIAGNOSIS — R09.02 HYPOXIA: ICD-10-CM

## 2023-05-11 DIAGNOSIS — I48.91 ATRIAL FIBRILLATION WITH RAPID VENTRICULAR RESPONSE (HCC): Primary | ICD-10-CM

## 2023-05-11 LAB
ALBUMIN SERPL-MCNC: 3.1 G/DL (ref 3.5–5)
ALBUMIN/GLOB SERPL: 0.7 (ref 1.1–2.2)
ALP SERPL-CCNC: 120 U/L (ref 45–117)
ALT SERPL-CCNC: 52 U/L (ref 12–78)
ANION GAP SERPL CALC-SCNC: 2 MMOL/L (ref 5–15)
ANION GAP SERPL CALC-SCNC: 6 MMOL/L (ref 5–15)
AST SERPL-CCNC: 70 U/L (ref 15–37)
AST SERPL-CCNC: ABNORMAL U/L (ref 15–37)
BILIRUB SERPL-MCNC: 2.3 MG/DL (ref 0.2–1)
BUN SERPL-MCNC: 12 MG/DL (ref 6–20)
BUN SERPL-MCNC: 13 MG/DL (ref 6–20)
BUN/CREAT SERPL: 25 (ref 12–20)
BUN/CREAT SERPL: 26 (ref 12–20)
CALCIUM SERPL-MCNC: 9.2 MG/DL (ref 8.5–10.1)
CALCIUM SERPL-MCNC: 9.5 MG/DL (ref 8.5–10.1)
CHLORIDE SERPL-SCNC: 89 MMOL/L (ref 97–108)
CHLORIDE SERPL-SCNC: 89 MMOL/L (ref 97–108)
CO2 SERPL-SCNC: 27 MMOL/L (ref 21–32)
CO2 SERPL-SCNC: 31 MMOL/L (ref 21–32)
COMMENT:: NORMAL
COMMENT:: NORMAL
CREAT SERPL-MCNC: 0.47 MG/DL (ref 0.55–1.02)
CREAT SERPL-MCNC: 0.53 MG/DL (ref 0.55–1.02)
ERYTHROCYTE [DISTWIDTH] IN BLOOD BY AUTOMATED COUNT: 16.1 % (ref 11.5–14.5)
GLOBULIN SER CALC-MCNC: 4.7 G/DL (ref 2–4)
GLUCOSE SERPL-MCNC: 81 MG/DL (ref 65–100)
GLUCOSE SERPL-MCNC: 90 MG/DL (ref 65–100)
HCT VFR BLD AUTO: 45.7 % (ref 35–47)
HGB BLD-MCNC: 15.1 G/DL (ref 11.5–16)
INR PPP: 1.2 (ref 0.9–1.1)
MCH RBC QN AUTO: 31.5 PG (ref 26–34)
MCHC RBC AUTO-ENTMCNC: 33 G/DL (ref 30–36.5)
MCV RBC AUTO: 95.4 FL (ref 80–99)
NRBC # BLD: 0 K/UL (ref 0–0.01)
NRBC BLD-RTO: 0 PER 100 WBC
PLATELET # BLD AUTO: 141 K/UL (ref 150–400)
PMV BLD AUTO: 10.7 FL (ref 8.9–12.9)
POTASSIUM SERPL-SCNC: 3.9 MMOL/L (ref 3.5–5.1)
POTASSIUM SERPL-SCNC: 5.8 MMOL/L (ref 3.5–5.1)
POTASSIUM SERPL-SCNC: ABNORMAL MMOL/L (ref 3.5–5.1)
PROT SERPL-MCNC: 7.8 G/DL (ref 6.4–8.2)
PROTHROMBIN TIME: 12.4 SEC (ref 9–11.1)
RBC # BLD AUTO: 4.79 M/UL (ref 3.8–5.2)
SODIUM SERPL-SCNC: 122 MMOL/L (ref 136–145)
SODIUM SERPL-SCNC: 122 MMOL/L (ref 136–145)
SPECIMEN HOLD: NORMAL
SPECIMEN HOLD: NORMAL
TROPONIN I SERPL HS-MCNC: 32 NG/L (ref 0–37)
TROPONIN I SERPL HS-MCNC: 38 NG/L (ref 0–37)
WBC # BLD AUTO: 9.2 K/UL (ref 3.6–11)

## 2023-05-11 PROCEDURE — 1100000003 HC PRIVATE W/ TELEMETRY

## 2023-05-11 PROCEDURE — 84484 ASSAY OF TROPONIN QUANT: CPT

## 2023-05-11 PROCEDURE — 84132 ASSAY OF SERUM POTASSIUM: CPT

## 2023-05-11 PROCEDURE — 36415 COLL VENOUS BLD VENIPUNCTURE: CPT

## 2023-05-11 PROCEDURE — P9047 ALBUMIN (HUMAN), 25%, 50ML: HCPCS | Performed by: HOSPITALIST

## 2023-05-11 PROCEDURE — 6360000002 HC RX W HCPCS: Performed by: HOSPITALIST

## 2023-05-11 PROCEDURE — 6370000000 HC RX 637 (ALT 250 FOR IP): Performed by: HOSPITALIST

## 2023-05-11 PROCEDURE — 80053 COMPREHEN METABOLIC PANEL: CPT

## 2023-05-11 PROCEDURE — 85610 PROTHROMBIN TIME: CPT

## 2023-05-11 PROCEDURE — 32555 ASPIRATE PLEURA W/ IMAGING: CPT

## 2023-05-11 PROCEDURE — 0W993ZZ DRAINAGE OF RIGHT PLEURAL CAVITY, PERCUTANEOUS APPROACH: ICD-10-PCS | Performed by: RADIOLOGY

## 2023-05-11 PROCEDURE — 85027 COMPLETE CBC AUTOMATED: CPT

## 2023-05-11 PROCEDURE — 71046 X-RAY EXAM CHEST 2 VIEWS: CPT

## 2023-05-11 PROCEDURE — 32554 ASPIRATE PLEURA W/O IMAGING: CPT

## 2023-05-11 PROCEDURE — 84450 TRANSFERASE (AST) (SGOT): CPT

## 2023-05-11 PROCEDURE — 99285 EMERGENCY DEPT VISIT HI MDM: CPT

## 2023-05-11 PROCEDURE — 71045 X-RAY EXAM CHEST 1 VIEW: CPT

## 2023-05-11 PROCEDURE — 99223 1ST HOSP IP/OBS HIGH 75: CPT | Performed by: INTERNAL MEDICINE

## 2023-05-11 PROCEDURE — 93005 ELECTROCARDIOGRAM TRACING: CPT | Performed by: EMERGENCY MEDICINE

## 2023-05-11 PROCEDURE — C1729 CATH, DRAINAGE: HCPCS

## 2023-05-11 PROCEDURE — 2500000003 HC RX 250 WO HCPCS

## 2023-05-11 RX ORDER — SODIUM CHLORIDE 0.9 % (FLUSH) 0.9 %
5-40 SYRINGE (ML) INJECTION PRN
Status: DISCONTINUED | OUTPATIENT
Start: 2023-05-11 | End: 2023-05-15 | Stop reason: HOSPADM

## 2023-05-11 RX ORDER — ONDANSETRON 2 MG/ML
4 INJECTION INTRAMUSCULAR; INTRAVENOUS EVERY 6 HOURS PRN
Status: DISCONTINUED | OUTPATIENT
Start: 2023-05-11 | End: 2023-05-15 | Stop reason: HOSPADM

## 2023-05-11 RX ORDER — LIDOCAINE HYDROCHLORIDE 10 MG/ML
10 INJECTION, SOLUTION EPIDURAL; INFILTRATION; INTRACAUDAL; PERINEURAL ONCE
Status: COMPLETED | OUTPATIENT
Start: 2023-05-11 | End: 2023-05-11

## 2023-05-11 RX ORDER — VALACYCLOVIR HYDROCHLORIDE 500 MG/1
500 TABLET, FILM COATED ORAL EVERY OTHER DAY
Status: DISCONTINUED | OUTPATIENT
Start: 2023-05-11 | End: 2023-05-11

## 2023-05-11 RX ORDER — SODIUM CHLORIDE 9 MG/ML
INJECTION, SOLUTION INTRAVENOUS PRN
Status: DISCONTINUED | OUTPATIENT
Start: 2023-05-11 | End: 2023-05-15 | Stop reason: HOSPADM

## 2023-05-11 RX ORDER — NADOLOL 40 MG/1
20 TABLET ORAL DAILY
Status: DISCONTINUED | OUTPATIENT
Start: 2023-05-11 | End: 2023-05-15 | Stop reason: HOSPADM

## 2023-05-11 RX ORDER — VITAMIN B COMPLEX
1000 TABLET ORAL DAILY
Status: DISCONTINUED | OUTPATIENT
Start: 2023-05-12 | End: 2023-05-15 | Stop reason: HOSPADM

## 2023-05-11 RX ORDER — PREDNISONE 10 MG/1
5 TABLET ORAL DAILY
Status: DISCONTINUED | OUTPATIENT
Start: 2023-05-11 | End: 2023-05-15 | Stop reason: HOSPADM

## 2023-05-11 RX ORDER — SPIRONOLACTONE 100 MG/1
100 TABLET, FILM COATED ORAL DAILY
Status: DISCONTINUED | OUTPATIENT
Start: 2023-05-11 | End: 2023-05-12

## 2023-05-11 RX ORDER — ACETAMINOPHEN 650 MG/1
650 SUPPOSITORY RECTAL EVERY 6 HOURS PRN
Status: DISCONTINUED | OUTPATIENT
Start: 2023-05-11 | End: 2023-05-15 | Stop reason: HOSPADM

## 2023-05-11 RX ORDER — CETIRIZINE HYDROCHLORIDE 10 MG/1
5 TABLET ORAL DAILY
Status: DISCONTINUED | OUTPATIENT
Start: 2023-05-12 | End: 2023-05-15 | Stop reason: HOSPADM

## 2023-05-11 RX ORDER — LIDOCAINE HYDROCHLORIDE 10 MG/ML
INJECTION, SOLUTION EPIDURAL; INFILTRATION; INTRACAUDAL; PERINEURAL
Status: COMPLETED
Start: 2023-05-11 | End: 2023-05-11

## 2023-05-11 RX ORDER — ACETAMINOPHEN 325 MG/1
650 TABLET ORAL EVERY 6 HOURS PRN
Status: DISCONTINUED | OUTPATIENT
Start: 2023-05-11 | End: 2023-05-15 | Stop reason: HOSPADM

## 2023-05-11 RX ORDER — ONDANSETRON 4 MG/1
4 TABLET, ORALLY DISINTEGRATING ORAL EVERY 8 HOURS PRN
Status: DISCONTINUED | OUTPATIENT
Start: 2023-05-11 | End: 2023-05-15 | Stop reason: HOSPADM

## 2023-05-11 RX ORDER — FUROSEMIDE 40 MG/1
40 TABLET ORAL DAILY
Status: DISCONTINUED | OUTPATIENT
Start: 2023-05-11 | End: 2023-05-12

## 2023-05-11 RX ORDER — ALBUMIN (HUMAN) 12.5 G/50ML
25 SOLUTION INTRAVENOUS EVERY 6 HOURS
Status: DISCONTINUED | OUTPATIENT
Start: 2023-05-11 | End: 2023-05-13

## 2023-05-11 RX ORDER — POLYETHYLENE GLYCOL 3350 17 G/17G
17 POWDER, FOR SOLUTION ORAL DAILY PRN
Status: DISCONTINUED | OUTPATIENT
Start: 2023-05-11 | End: 2023-05-15 | Stop reason: HOSPADM

## 2023-05-11 RX ORDER — SODIUM CHLORIDE 0.9 % (FLUSH) 0.9 %
5-40 SYRINGE (ML) INJECTION EVERY 12 HOURS SCHEDULED
Status: DISCONTINUED | OUTPATIENT
Start: 2023-05-11 | End: 2023-05-15 | Stop reason: HOSPADM

## 2023-05-11 RX ADMIN — LIDOCAINE HYDROCHLORIDE 10 ML: 10 INJECTION, SOLUTION EPIDURAL; INFILTRATION; INTRACAUDAL; PERINEURAL at 12:23

## 2023-05-11 RX ADMIN — ACETAMINOPHEN 650 MG: 325 TABLET, FILM COATED ORAL at 22:17

## 2023-05-11 RX ADMIN — ALBUMIN (HUMAN) 25 G: 0.25 INJECTION, SOLUTION INTRAVENOUS at 22:18

## 2023-05-11 RX ADMIN — ALBUMIN (HUMAN) 25 G: 0.25 INJECTION, SOLUTION INTRAVENOUS at 15:49

## 2023-05-11 RX ADMIN — PREDNISONE 5 MG: 10 TABLET ORAL at 15:49

## 2023-05-11 ASSESSMENT — ENCOUNTER SYMPTOMS
SHORTNESS OF BREATH: 1
ABDOMINAL DISTENTION: 1
VOMITING: 0
ABDOMINAL PAIN: 0

## 2023-05-11 ASSESSMENT — PAIN SCALES - GENERAL
PAINLEVEL_OUTOF10: 6
PAINLEVEL_OUTOF10: 5

## 2023-05-11 ASSESSMENT — PAIN - FUNCTIONAL ASSESSMENT
PAIN_FUNCTIONAL_ASSESSMENT: NONE - DENIES PAIN
PAIN_FUNCTIONAL_ASSESSMENT: NONE - DENIES PAIN

## 2023-05-11 ASSESSMENT — LIFESTYLE VARIABLES
HOW MANY STANDARD DRINKS CONTAINING ALCOHOL DO YOU HAVE ON A TYPICAL DAY: PATIENT DOES NOT DRINK
HOW OFTEN DO YOU HAVE A DRINK CONTAINING ALCOHOL: NEVER

## 2023-05-11 ASSESSMENT — PAIN DESCRIPTION - LOCATION: LOCATION: HEAD

## 2023-05-12 PROBLEM — R09.02 HYPOXIA: Status: ACTIVE | Noted: 2023-05-12

## 2023-05-12 PROBLEM — Z79.899 HIGH RISK MEDICATION USE: Status: ACTIVE | Noted: 2023-05-12

## 2023-05-12 PROBLEM — J91.8 PLEURAL EFFUSION ASSOCIATED WITH HEPATIC DISORDER: Status: ACTIVE | Noted: 2023-05-02

## 2023-05-12 PROBLEM — K76.9 PLEURAL EFFUSION ASSOCIATED WITH HEPATIC DISORDER: Status: ACTIVE | Noted: 2023-05-02

## 2023-05-12 LAB
ALBUMIN SERPL-MCNC: 3.8 G/DL (ref 3.5–5)
ALBUMIN/GLOB SERPL: 1.2 (ref 1.1–2.2)
ALP SERPL-CCNC: 82 U/L (ref 45–117)
ALT SERPL-CCNC: 34 U/L (ref 12–78)
ANION GAP SERPL CALC-SCNC: 7 MMOL/L (ref 5–15)
ANION GAP SERPL CALC-SCNC: 7 MMOL/L (ref 5–15)
AST SERPL-CCNC: 49 U/L (ref 15–37)
BASOPHILS # BLD: 0 K/UL (ref 0–0.1)
BASOPHILS NFR BLD: 0 % (ref 0–1)
BILIRUB SERPL-MCNC: 2.1 MG/DL (ref 0.2–1)
BUN SERPL-MCNC: 15 MG/DL (ref 6–20)
BUN SERPL-MCNC: 15 MG/DL (ref 6–20)
BUN/CREAT SERPL: 27 (ref 12–20)
BUN/CREAT SERPL: 29 (ref 12–20)
CALCIUM SERPL-MCNC: 10.1 MG/DL (ref 8.5–10.1)
CALCIUM SERPL-MCNC: 9.1 MG/DL (ref 8.5–10.1)
CHLORIDE SERPL-SCNC: 87 MMOL/L (ref 97–108)
CHLORIDE SERPL-SCNC: 90 MMOL/L (ref 97–108)
CO2 SERPL-SCNC: 31 MMOL/L (ref 21–32)
CO2 SERPL-SCNC: 32 MMOL/L (ref 21–32)
CREAT SERPL-MCNC: 0.52 MG/DL (ref 0.55–1.02)
CREAT SERPL-MCNC: 0.55 MG/DL (ref 0.55–1.02)
DIFFERENTIAL METHOD BLD: ABNORMAL
EKG ATRIAL RATE: 59 BPM
EKG DIAGNOSIS: NORMAL
EKG DIAGNOSIS: NORMAL
EKG P AXIS: 69 DEGREES
EKG P-R INTERVAL: 162 MS
EKG Q-T INTERVAL: 354 MS
EKG Q-T INTERVAL: 588 MS
EKG QRS DURATION: 86 MS
EKG QRS DURATION: 88 MS
EKG QTC CALCULATION (BAZETT): 474 MS
EKG QTC CALCULATION (BAZETT): 582 MS
EKG R AXIS: -23 DEGREES
EKG R AXIS: 55 DEGREES
EKG T AXIS: 31 DEGREES
EKG T AXIS: 59 DEGREES
EKG VENTRICULAR RATE: 108 BPM
EKG VENTRICULAR RATE: 59 BPM
EOSINOPHIL # BLD: 0 K/UL (ref 0–0.4)
EOSINOPHIL NFR BLD: 0 % (ref 0–7)
ERYTHROCYTE [DISTWIDTH] IN BLOOD BY AUTOMATED COUNT: 16.1 % (ref 11.5–14.5)
GLOBULIN SER CALC-MCNC: 3.3 G/DL (ref 2–4)
GLUCOSE BLD STRIP.AUTO-MCNC: 73 MG/DL (ref 65–117)
GLUCOSE SERPL-MCNC: 75 MG/DL (ref 65–100)
GLUCOSE SERPL-MCNC: 76 MG/DL (ref 65–100)
HCT VFR BLD AUTO: 38.5 % (ref 35–47)
HGB BLD-MCNC: 12.5 G/DL (ref 11.5–16)
IMM GRANULOCYTES # BLD AUTO: 0.2 K/UL (ref 0–0.04)
IMM GRANULOCYTES NFR BLD AUTO: 2 % (ref 0–0.5)
INR PPP: 1.3 (ref 0.9–1.1)
LYMPHOCYTES # BLD: 1 K/UL (ref 0.8–3.5)
LYMPHOCYTES NFR BLD: 12 % (ref 12–49)
MCH RBC QN AUTO: 31.7 PG (ref 26–34)
MCHC RBC AUTO-ENTMCNC: 32.5 G/DL (ref 30–36.5)
MCV RBC AUTO: 97.7 FL (ref 80–99)
MONOCYTES # BLD: 0.7 K/UL (ref 0–1)
MONOCYTES NFR BLD: 8 % (ref 5–13)
NEUTS SEG # BLD: 6.3 K/UL (ref 1.8–8)
NEUTS SEG NFR BLD: 78 % (ref 32–75)
NRBC # BLD: 0 K/UL (ref 0–0.01)
NRBC BLD-RTO: 0 PER 100 WBC
PLATELET # BLD AUTO: 101 K/UL (ref 150–400)
PMV BLD AUTO: 11.1 FL (ref 8.9–12.9)
POTASSIUM SERPL-SCNC: 4.3 MMOL/L (ref 3.5–5.1)
POTASSIUM SERPL-SCNC: 4.8 MMOL/L (ref 3.5–5.1)
PROT SERPL-MCNC: 7.1 G/DL (ref 6.4–8.2)
PROTHROMBIN TIME: 13.5 SEC (ref 9–11.1)
RBC # BLD AUTO: 3.94 M/UL (ref 3.8–5.2)
RBC MORPH BLD: ABNORMAL
SERVICE CMNT-IMP: NORMAL
SODIUM SERPL-SCNC: 126 MMOL/L (ref 136–145)
SODIUM SERPL-SCNC: 128 MMOL/L (ref 136–145)
WBC # BLD AUTO: 8.2 K/UL (ref 3.6–11)

## 2023-05-12 PROCEDURE — 85610 PROTHROMBIN TIME: CPT

## 2023-05-12 PROCEDURE — 85025 COMPLETE CBC W/AUTO DIFF WBC: CPT

## 2023-05-12 PROCEDURE — 80053 COMPREHEN METABOLIC PANEL: CPT

## 2023-05-12 PROCEDURE — 36415 COLL VENOUS BLD VENIPUNCTURE: CPT

## 2023-05-12 PROCEDURE — 6360000002 HC RX W HCPCS: Performed by: HOSPITALIST

## 2023-05-12 PROCEDURE — 93010 ELECTROCARDIOGRAM REPORT: CPT | Performed by: INTERNAL MEDICINE

## 2023-05-12 PROCEDURE — 94760 N-INVAS EAR/PLS OXIMETRY 1: CPT

## 2023-05-12 PROCEDURE — 1100000003 HC PRIVATE W/ TELEMETRY

## 2023-05-12 PROCEDURE — 82962 GLUCOSE BLOOD TEST: CPT

## 2023-05-12 PROCEDURE — 6370000000 HC RX 637 (ALT 250 FOR IP): Performed by: INTERNAL MEDICINE

## 2023-05-12 PROCEDURE — 2580000003 HC RX 258: Performed by: HOSPITALIST

## 2023-05-12 PROCEDURE — 99223 1ST HOSP IP/OBS HIGH 75: CPT | Performed by: INTERNAL MEDICINE

## 2023-05-12 PROCEDURE — 6370000000 HC RX 637 (ALT 250 FOR IP): Performed by: HOSPITALIST

## 2023-05-12 PROCEDURE — P9047 ALBUMIN (HUMAN), 25%, 50ML: HCPCS | Performed by: HOSPITALIST

## 2023-05-12 PROCEDURE — 2700000000 HC OXYGEN THERAPY PER DAY

## 2023-05-12 RX ORDER — FLECAINIDE ACETATE 100 MG/1
50 TABLET ORAL 2 TIMES DAILY
Status: DISCONTINUED | OUTPATIENT
Start: 2023-05-12 | End: 2023-05-15 | Stop reason: HOSPADM

## 2023-05-12 RX ADMIN — ALBUMIN (HUMAN) 25 G: 0.25 INJECTION, SOLUTION INTRAVENOUS at 09:08

## 2023-05-12 RX ADMIN — ALBUMIN (HUMAN) 25 G: 0.25 INJECTION, SOLUTION INTRAVENOUS at 04:06

## 2023-05-12 RX ADMIN — Medication 1000 UNITS: at 09:07

## 2023-05-12 RX ADMIN — SODIUM CHLORIDE, PRESERVATIVE FREE 10 ML: 5 INJECTION INTRAVENOUS at 09:08

## 2023-05-12 RX ADMIN — FLECAINIDE ACETATE 50 MG: 100 TABLET ORAL at 20:53

## 2023-05-12 RX ADMIN — ALBUMIN (HUMAN) 25 G: 0.25 INJECTION, SOLUTION INTRAVENOUS at 22:23

## 2023-05-12 RX ADMIN — NADOLOL 20 MG: 40 TABLET ORAL at 09:07

## 2023-05-12 RX ADMIN — SODIUM CHLORIDE, PRESERVATIVE FREE 10 ML: 5 INJECTION INTRAVENOUS at 20:57

## 2023-05-12 RX ADMIN — ACETAMINOPHEN 650 MG: 325 TABLET, FILM COATED ORAL at 09:07

## 2023-05-12 RX ADMIN — ALBUMIN (HUMAN) 25 G: 0.25 INJECTION, SOLUTION INTRAVENOUS at 16:31

## 2023-05-12 RX ADMIN — CETIRIZINE HYDROCHLORIDE 5 MG: 10 TABLET, FILM COATED ORAL at 09:07

## 2023-05-12 RX ADMIN — ACETAMINOPHEN 650 MG: 325 TABLET, FILM COATED ORAL at 20:52

## 2023-05-12 RX ADMIN — PREDNISONE 5 MG: 10 TABLET ORAL at 16:32

## 2023-05-12 ASSESSMENT — PAIN DESCRIPTION - ORIENTATION: ORIENTATION: LOWER

## 2023-05-12 ASSESSMENT — PAIN SCALES - GENERAL: PAINLEVEL_OUTOF10: 8

## 2023-05-12 ASSESSMENT — PAIN DESCRIPTION - LOCATION: LOCATION: BACK

## 2023-05-13 LAB
ANION GAP SERPL CALC-SCNC: 5 MMOL/L (ref 5–15)
BUN SERPL-MCNC: 12 MG/DL (ref 6–20)
BUN/CREAT SERPL: 29 (ref 12–20)
CALCIUM SERPL-MCNC: 9.4 MG/DL (ref 8.5–10.1)
CHLORIDE SERPL-SCNC: 88 MMOL/L (ref 97–108)
CO2 SERPL-SCNC: 33 MMOL/L (ref 21–32)
CORTIS SERPL-MCNC: 15.9 UG/DL
CREAT SERPL-MCNC: 0.41 MG/DL (ref 0.55–1.02)
ERYTHROCYTE [DISTWIDTH] IN BLOOD BY AUTOMATED COUNT: 16.2 % (ref 11.5–14.5)
GLUCOSE SERPL-MCNC: 99 MG/DL (ref 65–100)
HCT VFR BLD AUTO: 36.4 % (ref 35–47)
HGB BLD-MCNC: 11.8 G/DL (ref 11.5–16)
MCH RBC QN AUTO: 31.7 PG (ref 26–34)
MCHC RBC AUTO-ENTMCNC: 32.4 G/DL (ref 30–36.5)
MCV RBC AUTO: 97.8 FL (ref 80–99)
NRBC # BLD: 0 K/UL (ref 0–0.01)
NRBC BLD-RTO: 0 PER 100 WBC
OSMOLALITY UR: 268 MOSM/KG H2O
PLATELET # BLD AUTO: 75 K/UL (ref 150–400)
PMV BLD AUTO: 10.1 FL (ref 8.9–12.9)
POTASSIUM SERPL-SCNC: 4.6 MMOL/L (ref 3.5–5.1)
RBC # BLD AUTO: 3.72 M/UL (ref 3.8–5.2)
SODIUM SERPL-SCNC: 126 MMOL/L (ref 136–145)
SODIUM UR-SCNC: 7 MMOL/L
TSH SERPL DL<=0.05 MIU/L-ACNC: 0.96 UIU/ML (ref 0.36–3.74)
WBC # BLD AUTO: 5.2 K/UL (ref 3.6–11)

## 2023-05-13 PROCEDURE — 85027 COMPLETE CBC AUTOMATED: CPT

## 2023-05-13 PROCEDURE — 83935 ASSAY OF URINE OSMOLALITY: CPT

## 2023-05-13 PROCEDURE — 1100000003 HC PRIVATE W/ TELEMETRY

## 2023-05-13 PROCEDURE — 6360000002 HC RX W HCPCS: Performed by: HOSPITALIST

## 2023-05-13 PROCEDURE — 36415 COLL VENOUS BLD VENIPUNCTURE: CPT

## 2023-05-13 PROCEDURE — P9047 ALBUMIN (HUMAN), 25%, 50ML: HCPCS | Performed by: HOSPITALIST

## 2023-05-13 PROCEDURE — 2580000003 HC RX 258: Performed by: HOSPITALIST

## 2023-05-13 PROCEDURE — 99232 SBSQ HOSP IP/OBS MODERATE 35: CPT | Performed by: INTERNAL MEDICINE

## 2023-05-13 PROCEDURE — 2700000000 HC OXYGEN THERAPY PER DAY

## 2023-05-13 PROCEDURE — 84443 ASSAY THYROID STIM HORMONE: CPT

## 2023-05-13 PROCEDURE — 82533 TOTAL CORTISOL: CPT

## 2023-05-13 PROCEDURE — 6370000000 HC RX 637 (ALT 250 FOR IP): Performed by: INTERNAL MEDICINE

## 2023-05-13 PROCEDURE — 84300 ASSAY OF URINE SODIUM: CPT

## 2023-05-13 PROCEDURE — 6370000000 HC RX 637 (ALT 250 FOR IP): Performed by: HOSPITALIST

## 2023-05-13 PROCEDURE — 80048 BASIC METABOLIC PNL TOTAL CA: CPT

## 2023-05-13 RX ORDER — SPIRONOLACTONE 25 MG/1
50 TABLET ORAL DAILY
Status: DISCONTINUED | OUTPATIENT
Start: 2023-05-13 | End: 2023-05-15 | Stop reason: HOSPADM

## 2023-05-13 RX ORDER — FUROSEMIDE 10 MG/ML
40 INJECTION INTRAMUSCULAR; INTRAVENOUS ONCE
Status: COMPLETED | OUTPATIENT
Start: 2023-05-13 | End: 2023-05-13

## 2023-05-13 RX ADMIN — SODIUM CHLORIDE, PRESERVATIVE FREE 10 ML: 5 INJECTION INTRAVENOUS at 20:53

## 2023-05-13 RX ADMIN — CETIRIZINE HYDROCHLORIDE 5 MG: 10 TABLET, FILM COATED ORAL at 09:34

## 2023-05-13 RX ADMIN — Medication 1000 UNITS: at 09:34

## 2023-05-13 RX ADMIN — SODIUM CHLORIDE, PRESERVATIVE FREE 10 ML: 5 INJECTION INTRAVENOUS at 09:36

## 2023-05-13 RX ADMIN — SPIRONOLACTONE 50 MG: 25 TABLET ORAL at 12:22

## 2023-05-13 RX ADMIN — ACETAMINOPHEN 650 MG: 325 TABLET, FILM COATED ORAL at 12:43

## 2023-05-13 RX ADMIN — FLECAINIDE ACETATE 50 MG: 100 TABLET ORAL at 20:53

## 2023-05-13 RX ADMIN — PREDNISONE 5 MG: 10 TABLET ORAL at 15:00

## 2023-05-13 RX ADMIN — NADOLOL 20 MG: 40 TABLET ORAL at 09:35

## 2023-05-13 RX ADMIN — ALBUMIN (HUMAN) 25 G: 0.25 INJECTION, SOLUTION INTRAVENOUS at 04:33

## 2023-05-13 RX ADMIN — FLECAINIDE ACETATE 50 MG: 100 TABLET ORAL at 09:34

## 2023-05-13 RX ADMIN — FUROSEMIDE 40 MG: 10 INJECTION, SOLUTION INTRAMUSCULAR; INTRAVENOUS at 12:23

## 2023-05-13 ASSESSMENT — PAIN SCALES - GENERAL
PAINLEVEL_OUTOF10: 6
PAINLEVEL_OUTOF10: 0

## 2023-05-13 ASSESSMENT — PAIN DESCRIPTION - DESCRIPTORS: DESCRIPTORS: ACHING

## 2023-05-13 ASSESSMENT — PAIN DESCRIPTION - LOCATION: LOCATION: BACK

## 2023-05-13 ASSESSMENT — PAIN DESCRIPTION - ORIENTATION: ORIENTATION: LOWER

## 2023-05-14 LAB
ANION GAP SERPL CALC-SCNC: 3 MMOL/L (ref 5–15)
BUN SERPL-MCNC: 7 MG/DL (ref 6–20)
BUN/CREAT SERPL: 16 (ref 12–20)
CALCIUM SERPL-MCNC: 9.8 MG/DL (ref 8.5–10.1)
CHLORIDE SERPL-SCNC: 94 MMOL/L (ref 97–108)
CO2 SERPL-SCNC: 31 MMOL/L (ref 21–32)
CREAT SERPL-MCNC: 0.43 MG/DL (ref 0.55–1.02)
GLUCOSE SERPL-MCNC: 103 MG/DL (ref 65–100)
POTASSIUM SERPL-SCNC: 4.9 MMOL/L (ref 3.5–5.1)
SODIUM SERPL-SCNC: 128 MMOL/L (ref 136–145)

## 2023-05-14 PROCEDURE — 36415 COLL VENOUS BLD VENIPUNCTURE: CPT

## 2023-05-14 PROCEDURE — 80048 BASIC METABOLIC PNL TOTAL CA: CPT

## 2023-05-14 PROCEDURE — 6370000000 HC RX 637 (ALT 250 FOR IP): Performed by: HOSPITALIST

## 2023-05-14 PROCEDURE — 2580000003 HC RX 258: Performed by: HOSPITALIST

## 2023-05-14 PROCEDURE — 6360000002 HC RX W HCPCS: Performed by: HOSPITALIST

## 2023-05-14 PROCEDURE — 1100000003 HC PRIVATE W/ TELEMETRY

## 2023-05-14 PROCEDURE — 99232 SBSQ HOSP IP/OBS MODERATE 35: CPT | Performed by: INTERNAL MEDICINE

## 2023-05-14 PROCEDURE — 6370000000 HC RX 637 (ALT 250 FOR IP): Performed by: INTERNAL MEDICINE

## 2023-05-14 RX ORDER — FUROSEMIDE 10 MG/ML
40 INJECTION INTRAMUSCULAR; INTRAVENOUS ONCE
Status: COMPLETED | OUTPATIENT
Start: 2023-05-14 | End: 2023-05-14

## 2023-05-14 RX ADMIN — SPIRONOLACTONE 50 MG: 25 TABLET ORAL at 10:30

## 2023-05-14 RX ADMIN — SODIUM CHLORIDE, PRESERVATIVE FREE 10 ML: 5 INJECTION INTRAVENOUS at 20:48

## 2023-05-14 RX ADMIN — CETIRIZINE HYDROCHLORIDE 5 MG: 10 TABLET, FILM COATED ORAL at 09:33

## 2023-05-14 RX ADMIN — FUROSEMIDE 40 MG: 10 INJECTION, SOLUTION INTRAMUSCULAR; INTRAVENOUS at 12:00

## 2023-05-14 RX ADMIN — Medication 1000 UNITS: at 09:33

## 2023-05-14 RX ADMIN — FLECAINIDE ACETATE 50 MG: 100 TABLET ORAL at 20:45

## 2023-05-14 RX ADMIN — FLECAINIDE ACETATE 50 MG: 100 TABLET ORAL at 09:32

## 2023-05-14 RX ADMIN — NADOLOL 20 MG: 40 TABLET ORAL at 11:08

## 2023-05-14 RX ADMIN — PREDNISONE 5 MG: 10 TABLET ORAL at 17:39

## 2023-05-14 RX ADMIN — SODIUM CHLORIDE, PRESERVATIVE FREE 10 ML: 5 INJECTION INTRAVENOUS at 11:09

## 2023-05-14 ASSESSMENT — PAIN SCALES - GENERAL
PAINLEVEL_OUTOF10: 0
PAINLEVEL_OUTOF10: 0

## 2023-05-15 VITALS
HEIGHT: 65 IN | DIASTOLIC BLOOD PRESSURE: 84 MMHG | SYSTOLIC BLOOD PRESSURE: 127 MMHG | TEMPERATURE: 97.6 F | WEIGHT: 130.51 LBS | RESPIRATION RATE: 19 BRPM | HEART RATE: 54 BPM | OXYGEN SATURATION: 99 % | BODY MASS INDEX: 21.74 KG/M2

## 2023-05-15 PROBLEM — J91.8 PLEURAL EFFUSION ASSOCIATED WITH HEPATIC DISORDER: Status: RESOLVED | Noted: 2023-05-02 | Resolved: 2023-05-15

## 2023-05-15 PROBLEM — K76.9 PLEURAL EFFUSION ASSOCIATED WITH HEPATIC DISORDER: Status: RESOLVED | Noted: 2023-05-02 | Resolved: 2023-05-15

## 2023-05-15 PROBLEM — I48.91 ATRIAL FIBRILLATION WITH RAPID VENTRICULAR RESPONSE (HCC): Status: RESOLVED | Noted: 2023-05-06 | Resolved: 2023-05-15

## 2023-05-15 PROBLEM — R09.02 HYPOXIA: Status: RESOLVED | Noted: 2023-05-12 | Resolved: 2023-05-15

## 2023-05-15 LAB
ANION GAP SERPL CALC-SCNC: 5 MMOL/L (ref 5–15)
BUN SERPL-MCNC: 9 MG/DL (ref 6–20)
BUN/CREAT SERPL: 18 (ref 12–20)
CALCIUM SERPL-MCNC: 9.8 MG/DL (ref 8.5–10.1)
CHLORIDE SERPL-SCNC: 91 MMOL/L (ref 97–108)
CO2 SERPL-SCNC: 39 MMOL/L (ref 21–32)
CREAT SERPL-MCNC: 0.5 MG/DL (ref 0.55–1.02)
GLUCOSE SERPL-MCNC: 133 MG/DL (ref 65–100)
MAGNESIUM SERPL-MCNC: 2.1 MG/DL (ref 1.6–2.4)
POTASSIUM SERPL-SCNC: 4 MMOL/L (ref 3.5–5.1)
SODIUM SERPL-SCNC: 135 MMOL/L (ref 136–145)

## 2023-05-15 PROCEDURE — 80048 BASIC METABOLIC PNL TOTAL CA: CPT

## 2023-05-15 PROCEDURE — 83735 ASSAY OF MAGNESIUM: CPT

## 2023-05-15 PROCEDURE — 2580000003 HC RX 258: Performed by: HOSPITALIST

## 2023-05-15 PROCEDURE — 6370000000 HC RX 637 (ALT 250 FOR IP): Performed by: INTERNAL MEDICINE

## 2023-05-15 PROCEDURE — 6370000000 HC RX 637 (ALT 250 FOR IP): Performed by: HOSPITALIST

## 2023-05-15 PROCEDURE — 36415 COLL VENOUS BLD VENIPUNCTURE: CPT

## 2023-05-15 RX ORDER — SPIRONOLACTONE 100 MG/1
50 TABLET, FILM COATED ORAL DAILY
Qty: 15 TABLET | Refills: 0 | Status: SHIPPED | OUTPATIENT
Start: 2023-05-15 | End: 2023-06-14

## 2023-05-15 RX ORDER — FLECAINIDE ACETATE 50 MG/1
50 TABLET ORAL 2 TIMES DAILY
Qty: 60 TABLET | Refills: 0 | Status: SHIPPED | OUTPATIENT
Start: 2023-05-15 | End: 2023-05-15 | Stop reason: SDUPTHER

## 2023-05-15 RX ORDER — SPIRONOLACTONE 100 MG/1
50 TABLET, FILM COATED ORAL DAILY
Qty: 15 TABLET | Refills: 0 | Status: SHIPPED | OUTPATIENT
Start: 2023-05-15 | End: 2023-05-15 | Stop reason: SDUPTHER

## 2023-05-15 RX ORDER — FUROSEMIDE 40 MG/1
40 TABLET ORAL DAILY
Status: DISCONTINUED | OUTPATIENT
Start: 2023-05-15 | End: 2023-05-15 | Stop reason: HOSPADM

## 2023-05-15 RX ORDER — FLECAINIDE ACETATE 50 MG/1
50 TABLET ORAL 2 TIMES DAILY
Qty: 60 TABLET | Refills: 0 | Status: SHIPPED | OUTPATIENT
Start: 2023-05-15 | End: 2023-06-14

## 2023-05-15 RX ADMIN — SPIRONOLACTONE 50 MG: 25 TABLET ORAL at 08:35

## 2023-05-15 RX ADMIN — NADOLOL 20 MG: 40 TABLET ORAL at 08:36

## 2023-05-15 RX ADMIN — SODIUM CHLORIDE, PRESERVATIVE FREE 10 ML: 5 INJECTION INTRAVENOUS at 08:39

## 2023-05-15 RX ADMIN — FLECAINIDE ACETATE 50 MG: 100 TABLET ORAL at 08:36

## 2023-05-15 RX ADMIN — CETIRIZINE HYDROCHLORIDE 5 MG: 10 TABLET, FILM COATED ORAL at 08:35

## 2023-05-15 RX ADMIN — Medication 1000 UNITS: at 08:36

## 2023-05-15 ASSESSMENT — PAIN SCALES - GENERAL: PAINLEVEL_OUTOF10: 0

## 2023-05-15 NOTE — PROGRESS NOTES
Discharge medications reviewed with the patient and spouse and appropriate educational materials and side effects teaching were provided. Instructions reviewed as well.  Opportunity for questions and clarification

## 2023-05-15 NOTE — PLAN OF CARE
Problem: Safety - Adult  Goal: Free from fall injury  5/14/2023 2307 by Dylan Calabrese RN  Outcome: Progressing  5/14/2023 2307 by Dylan Calabrese, RN  Outcome: Progressing

## 2023-05-15 NOTE — DISCHARGE SUMMARY
Discharge Summary       PATIENT ID: Chuy Chicas  MRN: [id number]   YOB: 1950    DATE OF ADMISSION: 5/11/2023 10:17 AM    DATE OF DISCHARGE: 5/15/2023   PRIMARY CARE PROVIDER: Cash Yoon MD     ATTENDING PHYSICIAN: Sanjeev  DISCHARGING PROVIDER: Maura Whitney MD    To contact this individual call [phone] and ask the  to page. If unavailable ask to be transferred the Adult Hospitalist Department. CONSULTATIONS: IP CONSULT TO HEPATOLOGY  IP CONSULT TO HEPATOLOGY  IP CONSULT TO CARDIOLOGY    PROCEDURES/SURGERIES: * No surgery found *     ADMITTING DIAGNOSES & HOSPITAL COURSE:   74 y.o lady w/ autoimmune cirrhosis w/ recurrent R pleural effusion presented with shortness of breath. She was recently admitted to [facility] from 5/1 - 5/7 and had right-sided thoracentesis twice during that admission. She returned to the ED today due to worsening dyspnea. She underwent a R thoracentesis with 2L out and then developed afib w/ rvr post-procedure.     Autoimmune cirrhosis  Recurrent R hepatic hydrothorax  Hyponatremia  -s/p R thoracentesis w/ 2L out  -received IV albumin  -diuretics delayed due to hyponatremia, but on administering IV furosemide serum sodium improved significantly  -discharged on 1/2 step diuretics per hepatology recs  -counseled on fluid restriction     Afib w/ rvr: cardiology consulted and she was started on dofetilide, no anticoagulation due to high bleeding risk, Watchman to be considered outpatient  -in NSR on discharge      DISCHARGE DIAGNOSES / PLAN:        FOLLOW UP APPOINTMENTS:    Follow-up Information       Follow up With Specialties Details Why Contact Info    Makenna Aguilar MD Internal Medicine Schedule an appointment as soon as possible for a visit in 2 week(s) for follow-up of afib [address]  [facility] [address] [facility] [address] [phone]      Johanny Mckeon MD Hepatology, Internal Medicine, Gastroenterology Follow up  [id number]

## 2023-05-15 NOTE — DISCHARGE INSTRUCTIONS
Diet: maintain a fluid restriction of less than 2,000 mL's daily  Take Lasix and spironolactone as prescribed.   Flecainide prescribed for afib - follow-up with your cardiologist.    Future Appointments   Date Time Provider Torito Todd   5/25/2023 12:00 PM MD DOMINGA Copeland   8/18/2023  7:30 AM MD DOMINGA Copeland AMB

## 2023-05-15 NOTE — PROGRESS NOTES
Hospital follow-up PCP transitional care appointment has been scheduled with Dr. Arturo Rodriguez for May 30, 2023 at .11:30a  Pending patient discharge.   Miles Astorga, Care Management Assistant

## 2023-05-25 ENCOUNTER — TELEPHONE (OUTPATIENT)
Age: 73
End: 2023-05-25

## 2023-05-25 ENCOUNTER — OFFICE VISIT (OUTPATIENT)
Age: 73
End: 2023-05-25
Payer: MEDICARE

## 2023-05-25 VITALS
TEMPERATURE: 97.5 F | WEIGHT: 127.4 LBS | HEIGHT: 65 IN | DIASTOLIC BLOOD PRESSURE: 61 MMHG | BODY MASS INDEX: 21.23 KG/M2 | RESPIRATION RATE: 18 BRPM | SYSTOLIC BLOOD PRESSURE: 97 MMHG | HEART RATE: 83 BPM

## 2023-05-25 DIAGNOSIS — I48.20 CHRONIC ATRIAL FIBRILLATION, UNSPECIFIED (HCC): ICD-10-CM

## 2023-05-25 DIAGNOSIS — J91.8 PLEURAL EFFUSION ASSOCIATED WITH HEPATIC DISORDER: ICD-10-CM

## 2023-05-25 DIAGNOSIS — K76.9 PLEURAL EFFUSION ASSOCIATED WITH HEPATIC DISORDER: ICD-10-CM

## 2023-05-25 DIAGNOSIS — I48.3 TYPICAL ATRIAL FLUTTER (HCC): ICD-10-CM

## 2023-05-25 DIAGNOSIS — R18.8 CIRRHOSIS OF LIVER WITH ASCITES, UNSPECIFIED HEPATIC CIRRHOSIS TYPE (HCC): Primary | ICD-10-CM

## 2023-05-25 DIAGNOSIS — K74.60 CIRRHOSIS OF LIVER WITH ASCITES, UNSPECIFIED HEPATIC CIRRHOSIS TYPE (HCC): Primary | ICD-10-CM

## 2023-05-25 PROCEDURE — 1123F ACP DISCUSS/DSCN MKR DOCD: CPT | Performed by: INTERNAL MEDICINE

## 2023-05-25 PROCEDURE — 3017F COLORECTAL CA SCREEN DOC REV: CPT | Performed by: INTERNAL MEDICINE

## 2023-05-25 PROCEDURE — 1111F DSCHRG MED/CURRENT MED MERGE: CPT | Performed by: INTERNAL MEDICINE

## 2023-05-25 PROCEDURE — 1090F PRES/ABSN URINE INCON ASSESS: CPT | Performed by: INTERNAL MEDICINE

## 2023-05-25 PROCEDURE — 4004F PT TOBACCO SCREEN RCVD TLK: CPT | Performed by: INTERNAL MEDICINE

## 2023-05-25 PROCEDURE — G8400 PT W/DXA NO RESULTS DOC: HCPCS | Performed by: INTERNAL MEDICINE

## 2023-05-25 PROCEDURE — G8427 DOCREV CUR MEDS BY ELIG CLIN: HCPCS | Performed by: INTERNAL MEDICINE

## 2023-05-25 PROCEDURE — G8420 CALC BMI NORM PARAMETERS: HCPCS | Performed by: INTERNAL MEDICINE

## 2023-05-25 PROCEDURE — 99215 OFFICE O/P EST HI 40 MIN: CPT | Performed by: INTERNAL MEDICINE

## 2023-05-25 ASSESSMENT — PATIENT HEALTH QUESTIONNAIRE - PHQ9
SUM OF ALL RESPONSES TO PHQ QUESTIONS 1-9: 3
SUM OF ALL RESPONSES TO PHQ QUESTIONS 1-9: 3
2. FEELING DOWN, DEPRESSED OR HOPELESS: 0
SUM OF ALL RESPONSES TO PHQ QUESTIONS 1-9: 3
SUM OF ALL RESPONSES TO PHQ QUESTIONS 1-9: 3
1. LITTLE INTEREST OR PLEASURE IN DOING THINGS: 3
SUM OF ALL RESPONSES TO PHQ9 QUESTIONS 1 & 2: 3

## 2023-05-25 NOTE — TELEPHONE ENCOUNTER
----- Message from Polo Mahajan MD sent at 5/25/2023 12:40 PM EDT -----  Regarding: Please schedule thora for tomorrow or Monday

## 2023-05-25 NOTE — TELEPHONE ENCOUNTER
[email or web address] Scheduled thora for 5/26/23 arrival time 1:00pm for appt time 1:30 pm @[facility]. Called patient to given information and they will need to cancel appt related to grandchild graduation tomorrow. Phone number to Central Schedule given to patient/ and they will call in reschedule. Called central scheduling canceled tomorrows appt. (KF)

## 2023-05-26 ENCOUNTER — TELEPHONE (OUTPATIENT)
Age: 73
End: 2023-05-26

## 2023-05-26 ENCOUNTER — HOSPITAL ENCOUNTER (OUTPATIENT)
Facility: HOSPITAL | Age: 73
End: 2023-05-26
Attending: INTERNAL MEDICINE
Payer: MEDICARE

## 2023-05-26 VITALS
TEMPERATURE: 98.4 F | WEIGHT: 127 LBS | SYSTOLIC BLOOD PRESSURE: 92 MMHG | HEIGHT: 65 IN | BODY MASS INDEX: 21.16 KG/M2 | RESPIRATION RATE: 20 BRPM | DIASTOLIC BLOOD PRESSURE: 64 MMHG | HEART RATE: 96 BPM | OXYGEN SATURATION: 95 %

## 2023-05-26 DIAGNOSIS — K74.60 CIRRHOSIS OF LIVER WITH ASCITES, UNSPECIFIED HEPATIC CIRRHOSIS TYPE (HCC): ICD-10-CM

## 2023-05-26 DIAGNOSIS — R18.8 CIRRHOSIS OF LIVER WITH ASCITES, UNSPECIFIED HEPATIC CIRRHOSIS TYPE (HCC): ICD-10-CM

## 2023-05-26 LAB
AFP L3 MFR SERPL: NORMAL % (ref 0–9.9)
AFP SERPL-MCNC: 1.3 NG/ML (ref 0–9.2)
ALBUMIN SERPL-MCNC: 4 G/DL (ref 3.7–4.7)
ALP SERPL-CCNC: 111 IU/L (ref 44–121)
ALT SERPL-CCNC: 37 IU/L (ref 0–32)
AST SERPL-CCNC: 55 IU/L (ref 0–40)
BASOPHILS # BLD AUTO: 0 X10E3/UL (ref 0–0.2)
BASOPHILS NFR BLD AUTO: 0 %
BILIRUB DIRECT SERPL-MCNC: 0.65 MG/DL (ref 0–0.4)
BILIRUB SERPL-MCNC: 1.9 MG/DL (ref 0–1.2)
BUN SERPL-MCNC: 7 MG/DL (ref 8–27)
BUN/CREAT SERPL: 12 (ref 12–28)
CALCIUM SERPL-MCNC: 9.5 MG/DL (ref 8.7–10.3)
CHLORIDE SERPL-SCNC: 94 MMOL/L (ref 96–106)
CO2 SERPL-SCNC: 26 MMOL/L (ref 20–29)
COMMENT:: NORMAL
CREAT SERPL-MCNC: 0.57 MG/DL (ref 0.57–1)
EGFRCR SERPLBLD CKD-EPI 2021: 95 ML/MIN/1.73
EOSINOPHIL # BLD AUTO: 0 X10E3/UL (ref 0–0.4)
EOSINOPHIL NFR BLD AUTO: 0 %
ERYTHROCYTE [DISTWIDTH] IN BLOOD BY AUTOMATED COUNT: 14.1 % (ref 11.7–15.4)
GLUCOSE SERPL-MCNC: 86 MG/DL (ref 70–99)
HCT VFR BLD AUTO: 40.2 % (ref 34–46.6)
HGB BLD-MCNC: 14.3 G/DL (ref 11.1–15.9)
IMM GRANULOCYTES # BLD AUTO: 0 X10E3/UL (ref 0–0.1)
IMM GRANULOCYTES NFR BLD AUTO: 1 %
INR PPP: 1.2 (ref 0.9–1.2)
LYMPHOCYTES # BLD AUTO: 1.1 X10E3/UL (ref 0.7–3.1)
LYMPHOCYTES NFR BLD AUTO: 16 %
MCH RBC QN AUTO: 32.6 PG (ref 26.6–33)
MCHC RBC AUTO-ENTMCNC: 35.6 G/DL (ref 31.5–35.7)
MCV RBC AUTO: 92 FL (ref 79–97)
MONOCYTES # BLD AUTO: 0.5 X10E3/UL (ref 0.1–0.9)
MONOCYTES NFR BLD AUTO: 7 %
MORPHOLOGY BLD-IMP: ABNORMAL
NEUTROPHILS # BLD AUTO: 5.5 X10E3/UL (ref 1.4–7)
NEUTROPHILS NFR BLD AUTO: 76 %
PLATELET # BLD AUTO: 75 X10E3/UL (ref 150–450)
POTASSIUM SERPL-SCNC: 4.2 MMOL/L (ref 3.5–5.2)
PROT SERPL-MCNC: 7.4 G/DL (ref 6–8.5)
PROTHROMBIN TIME: 13 SEC (ref 9.1–12)
RBC # BLD AUTO: 4.39 X10E6/UL (ref 3.77–5.28)
SODIUM SERPL-SCNC: 138 MMOL/L (ref 134–144)
SPECIMEN HOLD: NORMAL
WBC # BLD AUTO: 7.2 X10E3/UL (ref 3.4–10.8)

## 2023-05-26 PROCEDURE — 71045 X-RAY EXAM CHEST 1 VIEW: CPT

## 2023-05-26 PROCEDURE — 32555 ASPIRATE PLEURA W/ IMAGING: CPT

## 2023-05-26 PROCEDURE — 2500000003 HC RX 250 WO HCPCS: Performed by: PHYSICIAN ASSISTANT

## 2023-05-26 RX ORDER — LIDOCAINE HYDROCHLORIDE 10 MG/ML
10 INJECTION, SOLUTION EPIDURAL; INFILTRATION; INTRACAUDAL; PERINEURAL ONCE
Status: COMPLETED | OUTPATIENT
Start: 2023-05-26 | End: 2023-05-26

## 2023-05-26 RX ADMIN — LIDOCAINE HYDROCHLORIDE 10 ML: 10 INJECTION, SOLUTION EPIDURAL; INFILTRATION; INTRACAUDAL; PERINEURAL at 14:20

## 2023-05-26 NOTE — PROGRESS NOTES
1320: Patient arrived in AY recovery area A&Ox4 on RA in NAD at this time. Vitals complete on arrival.  Confirmed PTA meds, allergy status, and code status. 1447: Post thoracentesis CXR obtained. Per Dr. Gabino Marinelli, CXR shows no signs of pneumothorax post thoracentesis. Discharge instructions reviewed with patient. Opportunity for questions and clarification given. Patient verbalized understanding. Patient wheeled to discharge area of MOB 1.

## 2023-05-26 NOTE — DISCHARGE INSTRUCTIONS
[address]  Radiology Department  [phone]    Radiologist:    Date:      Thoracentesis Discharge Instructions    You may have an aching pain in the puncture site tonight as the numbing medicine wears off. You may take Tylenol, as directed on the label, for pain or discomfort. Avoid ibuprofen (Advil, Motrin) and aspirin products for the next 48 hours as these drugs may increase your chance of bleeding. You have develop a mild cough as the lungs re expand with air, this should resolve with in the next 24 hours. Resume your previous diet and continue your prescribed medicaitons. Rest for the next 24 hours. If you experience shortness of breath (other than your normal breathing pattern) difficulty breathing, or have severe chest pain, call 911 and go to the nearest Emergency Room.     Be sure to follow up with your referring physician as soon as possible    Other:

## 2023-05-26 NOTE — PROGRESS NOTES
Name of Procedure: Right sided thoracentesis     Vital Signs:  VSS throughout     Fluids Removed: 1800ml clear yellow fluid removed      Samples sent to lab: hold sample sent to lab     Any complications related to procedure: none     Patient is A&Ox4, on RA, and is in NAD at this time.

## 2023-05-26 NOTE — TELEPHONE ENCOUNTER
----- Message from Speedy Murillo MD sent at 5/25/2023 12:42 PM EDT -----  Regarding: Tim DAMON and Reynaldo let me know        [email or web address] Results printed in given to Sen in hand to review. (KF)

## 2023-05-30 ENCOUNTER — TELEPHONE (OUTPATIENT)
Age: 73
End: 2023-05-30

## 2023-05-30 NOTE — TELEPHONE ENCOUNTER
----- Message from Valery Gates MD sent at 5/30/2023 12:14 PM EDT -----  Regarding: RE: SChedule Para this week. I do not thinks so  MLS    ----- Message -----  From: Violet Prasad RN  Sent: 5/30/2023  11:26 AM EDT  To: Valery Gates MD  Subject: FW: SChedule Para this week. Patient had thora on 5/26/23 so do she need para also? ???  ----- Message -----  From: Valery Gates MD  Sent: 5/27/2023   9:12 AM EDT  To: Violet Prasad RN  Subject: SChedule Para this week.                           ----- Message -----  From: Violet Prasad RN  Sent: 5/25/2023   2:42 PM EDT  To: Valery Gates MD  Subject: Debby Annberry: Please schedule thora for tomorrow or Mo#    FYI  ----- Message -----  From: Valery Gates MD  Sent: 5/25/2023  12:40 PM EDT  To: Violet Prasad RN  Subject: Please schedule thora for tomorrow or Monday           [email or web address] III okay no para needed at this time. (KF)

## 2023-06-01 ENCOUNTER — TELEPHONE (OUTPATIENT)
Age: 73
End: 2023-06-01

## 2023-06-01 NOTE — TELEPHONE ENCOUNTER
----- Message from Lisette Hall MD sent at 5/30/2023 12:14 PM EDT -----  Regarding: RE: SChedule Para this week. Call and ask if she has ascites and if doing ok after para,  MLS    ----- Message -----  From: Sheryl Sharp RN  Sent: 5/30/2023  11:26 AM EDT  To: Lisette Hall MD  Subject: FW: SChedule Para this week. Patient had thora on 5/26/23 so do she need para also? ???  ----- Message -----  From: Lisette Hall MD  Sent: 5/27/2023   9:12 AM EDT  To: Sheryl Sharp RN  Subject: SChedule Para this week.                           ----- Message -----  From: Sheryl Sharp RN  Sent: 5/25/2023   2:42 PM EDT  To: Lisette Hall MD  Subject: Geovanna Enciso: Please schedule thora for tomorrow or Mo#    FYI  ----- Message -----  From: Lisette Hall MD  Sent: 5/25/2023  12:40 PM EDT  To: Sheryl Sharp RN  Subject: Please schedule thora for tomorrow or Monday             [email or web address] Spoke w/patient at this time no ascites. Patient did have thora and doing much better. (KF)

## 2023-06-05 ENCOUNTER — TELEPHONE (OUTPATIENT)
Age: 73
End: 2023-06-05

## 2023-06-05 NOTE — TELEPHONE ENCOUNTER
----- Message from Niki Aponte MD sent at 6/2/2023  6:22 PM EDT -----  Regarding: RE: Removing Nadolol  Contact: [phone]  Sounds good. I hope that helps. MLS    ----- Message -----  From: Reece Lyle RN  Sent: 5/30/2023   1:58 PM EDT  To: Niki Aponte MD  Subject: Serenity Noun: Removing Nadolol                               ----- Message -----  From: Hi Cade  Sent: 5/30/2023   1:53 PM EDT  To: , #  Subject: Removing Nadolol                                 The heart doctor, Luca Tinoco says he cannot remove Nadolol since he did not prescribe it. I,m going to go off Nadolol tonight (May 30th). Just wanted you to know.     Norm Ricketts

## 2023-06-05 NOTE — TELEPHONE ENCOUNTER
----- Message from Annita Vaca sent at 6/5/2023  8:37 AM EDT -----  Regarding: FW: Lungs  Contact: [phone]    ----- Message -----  From: Maria M Reyes  Sent: 6/4/2023   8:18 PM EDT  To: , #  Subject: Lungs                                            [place], I am having some pain,  what I call, air bubbles in the sac around my lungs. Mostly the left side. I wondered if this is from the thoracentesis, even though they were all on the right lung. Any suggestions would be appreciated. [email or web address] Spoke w/patient concerning pain/discomfort from thoracentesis. Per patient as of last night pain/discomfort has eased up. Advise patient to keep moving around and walking to release the gas that can get trapped in the shoulder which cause pain/discomfort. Advise patient if symptoms comes back give our office a call back. Patient verbalize understanding. (KF)

## 2023-06-05 NOTE — TELEPHONE ENCOUNTER
Sounds good. I hope that helps. MLS. Sent message by Miriam on stopping Nadolol per  above message. (KF)

## 2023-06-20 PROBLEM — K76.9 PLEURAL EFFUSION ASSOCIATED WITH HEPATIC DISORDER: Status: ACTIVE | Noted: 2023-06-20

## 2023-06-20 PROBLEM — J91.8 PLEURAL EFFUSION ASSOCIATED WITH HEPATIC DISORDER: Status: ACTIVE | Noted: 2023-06-20

## 2023-06-20 PROBLEM — Z79.899 HIGH RISK MEDICATION USE: Status: RESOLVED | Noted: 2023-05-12 | Resolved: 2023-06-20

## 2023-07-24 ENCOUNTER — CLINICAL DOCUMENTATION (OUTPATIENT)
Age: 73
End: 2023-07-24

## 2023-07-24 NOTE — PROGRESS NOTES
Office notes received from [address] [place] [address] (encounter date 7.21.23) and placed in provider's box to review.

## 2023-08-18 ENCOUNTER — ANESTHESIA (OUTPATIENT)
Facility: HOSPITAL | Age: 73
End: 2023-08-18
Payer: MEDICARE

## 2023-08-18 ENCOUNTER — HOSPITAL ENCOUNTER (OUTPATIENT)
Facility: HOSPITAL | Age: 73
Setting detail: OUTPATIENT SURGERY
Discharge: HOME | End: 2023-08-18
Attending: INTERNAL MEDICINE | Admitting: INTERNAL MEDICINE
Payer: MEDICARE

## 2023-08-18 ENCOUNTER — ANESTHESIA EVENT (OUTPATIENT)
Facility: HOSPITAL | Age: 73
End: 2023-08-18
Payer: MEDICARE

## 2023-08-18 VITALS
OXYGEN SATURATION: 100 % | WEIGHT: 122 LBS | HEART RATE: 67 BPM | BODY MASS INDEX: 20.3 KG/M2 | SYSTOLIC BLOOD PRESSURE: 126 MMHG | RESPIRATION RATE: 20 BRPM | TEMPERATURE: 97.5 F | DIASTOLIC BLOOD PRESSURE: 64 MMHG

## 2023-08-18 PROCEDURE — 43239 EGD BIOPSY SINGLE/MULTIPLE: CPT | Performed by: INTERNAL MEDICINE

## 2023-08-18 PROCEDURE — 3700000000 HC ANESTHESIA ATTENDED CARE: Performed by: INTERNAL MEDICINE

## 2023-08-18 PROCEDURE — 3600007502: Performed by: INTERNAL MEDICINE

## 2023-08-18 PROCEDURE — 88305 TISSUE EXAM BY PATHOLOGIST: CPT

## 2023-08-18 PROCEDURE — 7100000011 HC PHASE II RECOVERY - ADDTL 15 MIN: Performed by: INTERNAL MEDICINE

## 2023-08-18 PROCEDURE — 2500000003 HC RX 250 WO HCPCS: Performed by: NURSE ANESTHETIST, CERTIFIED REGISTERED

## 2023-08-18 PROCEDURE — 2709999900 HC NON-CHARGEABLE SUPPLY: Performed by: INTERNAL MEDICINE

## 2023-08-18 PROCEDURE — 43244 EGD VARICES LIGATION: CPT | Performed by: INTERNAL MEDICINE

## 2023-08-18 PROCEDURE — 2580000003 HC RX 258: Performed by: INTERNAL MEDICINE

## 2023-08-18 PROCEDURE — 3700000001 HC ADD 15 MINUTES (ANESTHESIA): Performed by: INTERNAL MEDICINE

## 2023-08-18 PROCEDURE — C1889 IMPLANT/INSERT DEVICE, NOC: HCPCS | Performed by: INTERNAL MEDICINE

## 2023-08-18 PROCEDURE — 6360000002 HC RX W HCPCS: Performed by: NURSE ANESTHETIST, CERTIFIED REGISTERED

## 2023-08-18 PROCEDURE — 2720000010 HC SURG SUPPLY STERILE: Performed by: INTERNAL MEDICINE

## 2023-08-18 PROCEDURE — 7100000010 HC PHASE II RECOVERY - FIRST 15 MIN: Performed by: INTERNAL MEDICINE

## 2023-08-18 PROCEDURE — 3600007512: Performed by: INTERNAL MEDICINE

## 2023-08-18 PROCEDURE — 88342 IMHCHEM/IMCYTCHM 1ST ANTB: CPT

## 2023-08-18 DEVICE — WORKING LENGTH 235CM, WORKING CHANNEL 2.8MM
Type: IMPLANTABLE DEVICE | Site: OTHER | Status: FUNCTIONAL
Brand: RESOLUTION 360 CLIP

## 2023-08-18 RX ORDER — SODIUM CHLORIDE 9 MG/ML
INJECTION, SOLUTION INTRAVENOUS PRN
Status: DISCONTINUED | OUTPATIENT
Start: 2023-08-18 | End: 2023-08-18 | Stop reason: HOSPADM

## 2023-08-18 RX ORDER — SODIUM CHLORIDE 0.9 % (FLUSH) 0.9 %
5-40 SYRINGE (ML) INJECTION EVERY 12 HOURS SCHEDULED
Status: DISCONTINUED | OUTPATIENT
Start: 2023-08-18 | End: 2023-08-18 | Stop reason: HOSPADM

## 2023-08-18 RX ORDER — FENTANYL CITRATE 50 UG/ML
25 INJECTION, SOLUTION INTRAMUSCULAR; INTRAVENOUS AS NEEDED
Status: DISCONTINUED | OUTPATIENT
Start: 2023-08-18 | End: 2023-08-18 | Stop reason: HOSPADM

## 2023-08-18 RX ORDER — SIMETHICONE 20 MG/.3ML
EMULSION ORAL
Status: DISCONTINUED
Start: 2023-08-18 | End: 2023-08-18 | Stop reason: HOSPADM

## 2023-08-18 RX ORDER — SODIUM CHLORIDE 0.9 % (FLUSH) 0.9 %
5-40 SYRINGE (ML) INJECTION PRN
Status: DISCONTINUED | OUTPATIENT
Start: 2023-08-18 | End: 2023-08-18 | Stop reason: HOSPADM

## 2023-08-18 RX ORDER — ONDANSETRON 2 MG/ML
2 INJECTION INTRAMUSCULAR; INTRAVENOUS AS NEEDED
Status: DISCONTINUED | OUTPATIENT
Start: 2023-08-18 | End: 2023-08-18 | Stop reason: HOSPADM

## 2023-08-18 RX ORDER — ZOLEDRONIC ACID 5 MG/100ML
5 INJECTION, SOLUTION INTRAVENOUS ONCE
COMMUNITY

## 2023-08-18 RX ORDER — GLYCOPYRROLATE 0.2 MG/ML
INJECTION INTRAMUSCULAR; INTRAVENOUS PRN
Status: DISCONTINUED | OUTPATIENT
Start: 2023-08-18 | End: 2023-08-18 | Stop reason: SDUPTHER

## 2023-08-18 RX ORDER — LIDOCAINE HYDROCHLORIDE 20 MG/ML
INJECTION, SOLUTION EPIDURAL; INFILTRATION; INTRACAUDAL; PERINEURAL PRN
Status: DISCONTINUED | OUTPATIENT
Start: 2023-08-18 | End: 2023-08-18 | Stop reason: SDUPTHER

## 2023-08-18 RX ADMIN — LIDOCAINE HYDROCHLORIDE 100 MG: 20 INJECTION, SOLUTION EPIDURAL; INFILTRATION; INTRACAUDAL; PERINEURAL at 07:46

## 2023-08-18 RX ADMIN — PROPOFOL 40 MG: 10 INJECTION, EMULSION INTRAVENOUS at 07:56

## 2023-08-18 RX ADMIN — PROPOFOL 20 MG: 10 INJECTION, EMULSION INTRAVENOUS at 07:51

## 2023-08-18 RX ADMIN — PROPOFOL 30 MG: 10 INJECTION, EMULSION INTRAVENOUS at 07:53

## 2023-08-18 RX ADMIN — GLYCOPYRROLATE 0.2 MG: 0.2 INJECTION INTRAMUSCULAR; INTRAVENOUS at 07:38

## 2023-08-18 RX ADMIN — PROPOFOL 30 MG: 10 INJECTION, EMULSION INTRAVENOUS at 08:00

## 2023-08-18 RX ADMIN — PROPOFOL 100 MG: 10 INJECTION, EMULSION INTRAVENOUS at 07:47

## 2023-08-18 RX ADMIN — SODIUM CHLORIDE: 9 INJECTION, SOLUTION INTRAVENOUS at 07:38

## 2023-08-18 RX ADMIN — PROPOFOL 50 MG: 10 INJECTION, EMULSION INTRAVENOUS at 07:49

## 2023-08-18 RX ADMIN — PROPOFOL 30 MG: 10 INJECTION, EMULSION INTRAVENOUS at 07:59

## 2023-08-18 ASSESSMENT — PAIN - FUNCTIONAL ASSESSMENT: PAIN_FUNCTIONAL_ASSESSMENT: NONE - DENIES PAIN

## 2023-08-18 NOTE — DISCHARGE INSTRUCTIONS
MD Sarahy, FACP, [place], [place]      MEENU Miles, [facility]   Morales Tay, [facility]   Magy Mas, DANO Olivares, [facility]   Trae Gamarco, [facility]      [address]   at [facility]   [address] [facility], [address] [facility], [address] [place] [address]   [phone]   FAX: [phone]   at [facility], [address] [facility], [address]   [phone]   FAX: [phone]         ENDOSCOPY WITH BANDING DISCHARGE INSTRUCTIONS    Maebelle Goltz    1950  Date: 8/18/2023    DISCOMFORT:  Use lozenges or warm salt water gargle for sore thoat  Apply warm compress to IV site if red. If redness or soreness persists call the office. You may experience gas and bloating. Walking and belching will help relieve this. You may experience chest pain or discomfort or feel as though food is \"sticking\" in your food pipe for a few days after the procedure. This is a normal feeling after banding of esophageal varices. CHEST PRESSURE:  Banding of dilated veins (varices) in the food tube (esophagus) can be painful in some persons. The pain is caused by squeezing the varices and lining of the esophagus by the bands used to seal the varices. You can take liquid pain medication either acetaminophen or alternative. The best treatment for this is to drink a an \"Icee\" or \"Slurpee\" since the ice crystals will freeze the nerve endings in the food tube and relieve the pain. DIET:  Regular food may dislodge the bands placed on the varices. For this reason you should only have liquid for the rest of today. Eat only soft food that does not need to be chewed all day tomorrow. You may advance to your regular diet in 2 days.     ACTIVITY:  Spend the remainder of the day

## 2023-08-18 NOTE — H&P
MD Sarahy, FACP, [place], [place]      MEENU Rodriguez, [facility]   Jerel Lynn, [facility]   Jimi Owens, FNPTUCKER Burrows, FNPTUCKER Romo, [facility]   Barbara Jimenez, [facility]      [address]   at [facility]   [address] [facility], [address] [facility]   [facility], [address] [facility]   [phone]   FAX: [phone]   at [facility], [address] [facility], [address]   [phone]   FAX: [phone]         PRE-PROCEDURE NOTE - EGD    H and P from last office visit reviewed. Allergies reviewed. Out-patient medicaton list reviewed. Patient Active Problem List   Diagnosis    Chronic ITP (idiopathic thrombocytopenia) (HCC)    Cirrhosis (HCC)    Herpes    Portal hypertension (HCC)    History of esophageal varices with bleeding    Autoimmune hepatitis (HCC)    Thrombocytopenia (HCC)    Anticoagulation management encounter    Chronic atrial fibrillation, unspecified    Typical atrial flutter    Pleural effusion associated with hepatic disorder         Allergies   Allergen Reactions    Aspirin Anaphylaxis    Mushroom Extract Complex Hives       No current facility-administered medications on file prior to encounter.      Current Outpatient Medications on File Prior to Encounter   Medication Sig Dispense Refill    zoledronic acid (RECLAST) 5 MG/100ML SOLN Infuse 100 mLs intravenously once      flecainide (TAMBOCOR) 50 MG tablet Take 1 tablet by mouth 2 times daily 60 tablet 0    spironolactone (ALDACTONE) 100 MG tablet Take 0.5 tablets by mouth daily 15 tablet 0    furosemide (LASIX) 40 MG tablet Take 1 tablet by mouth daily 30 tablet 3    Multiple Vitamins-Minerals (THERAPEUTIC MULTIVITAMIN-MINERALS) tablet Take 1 tablet by mouth daily      cetirizine (ZYRTEC) 10 MG tablet Take 0.5

## 2023-08-18 NOTE — OP NOTE
MD Sarahy, Eriberto Garcias PA-C April S Ashworth, [facility]   Julianne King, [facility]   Gael Morocho, FNLUIZA Gonzalez FNLUIZA Johnson, [facility]   [facility]      [address]   at [facility]   [address] [facility], [address] [facility], [address]   [phone]   FAX: [address]   at [facility], [address] [facility], [address]   [phone]   FAX: [phone]          UPPER ENDOSCOPY WITH BANDING OF ESOPHAGEAL VARICES PROCEDURE NOTE    NAME:  Jairo Portillo  :  [id number]  MRN:  [id number]      INDICATION: Cirrhosis. Screening for esophageal varices with variceal ligation if indicated. : Adarsh Rendon MD    SURGICAL ASSISTANT:  None    PROSTHETIC DEVISES, TISSUE GRAFTS, ORGAN TRANSPLANTS:  Not applicable     ANESTHESIA/SEDATION: Propofol was administered by anesthesia      PROCEDURE DESCRIPTION:  Informed consent was obtained from the patient for the procedure. All risks and benefits of the procedure explained. The procedure was performed in the endoscopy suite. The patient was laying on a stretcher and moved to the left lateral decubitus position prior to administration of sedation. Sedation was administered by anesthesiology. See their note for details. The endoscope was inserted into the mouth and advanced under direct vision to the second portion of the duodenum. Careful inspection of upper gastrointestinal tract was made as the endoscope was inserted and withdrawn. Retroflexion of the endoscope to view of the cardia of the stomach was performed. After withdrawing the endoscope the banding devise was placed on the tip of the endoscope.   The scope was then reinserted under direct inspection and advanced

## 2023-08-18 NOTE — PROGRESS NOTES

## 2023-09-28 ENCOUNTER — OFFICE VISIT (OUTPATIENT)
Age: 73
End: 2023-09-28
Payer: MEDICARE

## 2023-09-28 VITALS
BODY MASS INDEX: 19.16 KG/M2 | SYSTOLIC BLOOD PRESSURE: 122 MMHG | HEART RATE: 55 BPM | DIASTOLIC BLOOD PRESSURE: 80 MMHG | OXYGEN SATURATION: 98 % | WEIGHT: 115 LBS | HEIGHT: 65 IN | TEMPERATURE: 97.8 F

## 2023-09-28 DIAGNOSIS — K74.60 CIRRHOSIS OF LIVER WITH ASCITES, UNSPECIFIED HEPATIC CIRRHOSIS TYPE (HCC): Primary | ICD-10-CM

## 2023-09-28 DIAGNOSIS — R18.8 CIRRHOSIS OF LIVER WITH ASCITES, UNSPECIFIED HEPATIC CIRRHOSIS TYPE (HCC): Primary | ICD-10-CM

## 2023-09-28 PROCEDURE — G8400 PT W/DXA NO RESULTS DOC: HCPCS | Performed by: NURSE PRACTITIONER

## 2023-09-28 PROCEDURE — G8427 DOCREV CUR MEDS BY ELIG CLIN: HCPCS | Performed by: NURSE PRACTITIONER

## 2023-09-28 PROCEDURE — G8420 CALC BMI NORM PARAMETERS: HCPCS | Performed by: NURSE PRACTITIONER

## 2023-09-28 PROCEDURE — 1123F ACP DISCUSS/DSCN MKR DOCD: CPT | Performed by: NURSE PRACTITIONER

## 2023-09-28 PROCEDURE — 3017F COLORECTAL CA SCREEN DOC REV: CPT | Performed by: NURSE PRACTITIONER

## 2023-09-28 PROCEDURE — 99214 OFFICE O/P EST MOD 30 MIN: CPT | Performed by: NURSE PRACTITIONER

## 2023-09-28 PROCEDURE — 1036F TOBACCO NON-USER: CPT | Performed by: NURSE PRACTITIONER

## 2023-09-28 PROCEDURE — 1090F PRES/ABSN URINE INCON ASSESS: CPT | Performed by: NURSE PRACTITIONER

## 2023-09-28 ASSESSMENT — PATIENT HEALTH QUESTIONNAIRE - PHQ9
SUM OF ALL RESPONSES TO PHQ QUESTIONS 1-9: 0
1. LITTLE INTEREST OR PLEASURE IN DOING THINGS: 0
SUM OF ALL RESPONSES TO PHQ9 QUESTIONS 1 & 2: 0
2. FEELING DOWN, DEPRESSED OR HOPELESS: 0

## 2023-09-28 NOTE — PROGRESS NOTES
[facility] 5314 MD Raf, FACP, Eriberto Purcell, MEENU Caro, AGPCNP-BC   Syed Le, [facility]-AG   Alessandra Morales, FNP-C  W[facility]y [facility], FNP-C   Kelsy Kingsley, AGPCNP-BC      [address]   at [facility]   [facility], [address]   [facility], [address]   [phone]   FAX: [phone]   at [facility], [address] [facility], [address]   [phone]   FAX: [phone]       Patient Care Team:  Susi Solano MD as PCP - General      Patient Active Problem List   Diagnosis    Chronic ITP (idiopathic thrombocytopenia) (HCC)    Cirrhosis ([address])    Herpes    Portal hypertension ([address])    History of esophageal varices with bleeding    Autoimmune hepatitis ([address])    Thrombocytopenia ([address])    Anticoagulation management encounter    Chronic atrial fibrillation, unspecified    Typical atrial flutter    Pleural effusion associated with hepatic disorder         Rae Man is being seen at [address] [place] [address],[address] of [place] for 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 The active problem list, all pertinent past medical history, medications, liver histology, endoscopic studies, radiologic findings and laboratory findings related to the liver disorder were reviewed and discussed with the patient. The patient is a 74 y.o.  female who was found to have abnormalities in liver transaminases and alkaline phosphatase in 1989. AIH has been in biochemical remission for 20+ years on low dose prednisone utilized to treat ITP     She was found to have cirrhosis in about 2020. Since the last office appointment:  No new hepatic hydrothorax since 5/2023. She had an EGD in 8/2023 4 bands placed.   Next EGD is scheduled for 11/16/2023  Stopped nadolol due to side

## 2023-09-29 LAB
ALBUMIN SERPL-MCNC: 3.9 G/DL (ref 3.8–4.8)
ALP SERPL-CCNC: 146 IU/L (ref 44–121)
ALT SERPL-CCNC: 34 IU/L (ref 0–32)
AST SERPL-CCNC: 51 IU/L (ref 0–40)
BASOPHILS # BLD AUTO: 0 X10E3/UL (ref 0–0.2)
BASOPHILS NFR BLD AUTO: 0 %
BILIRUB DIRECT SERPL-MCNC: 0.37 MG/DL (ref 0–0.4)
BILIRUB SERPL-MCNC: 1 MG/DL (ref 0–1.2)
BUN SERPL-MCNC: 13 MG/DL (ref 8–27)
BUN/CREAT SERPL: 19 (ref 12–28)
CALCIUM SERPL-MCNC: 9.9 MG/DL (ref 8.7–10.3)
CHLORIDE SERPL-SCNC: 100 MMOL/L (ref 96–106)
CO2 SERPL-SCNC: 28 MMOL/L (ref 20–29)
CREAT SERPL-MCNC: 0.69 MG/DL (ref 0.57–1)
EGFRCR SERPLBLD CKD-EPI 2021: 90 ML/MIN/1.73
EOSINOPHIL # BLD AUTO: 0 X10E3/UL (ref 0–0.4)
EOSINOPHIL NFR BLD AUTO: 0 %
ERYTHROCYTE [DISTWIDTH] IN BLOOD BY AUTOMATED COUNT: 13.5 % (ref 11.7–15.4)
GLUCOSE SERPL-MCNC: 53 MG/DL (ref 70–99)
HCT VFR BLD AUTO: 43.1 % (ref 34–46.6)
HGB BLD-MCNC: 14.7 G/DL (ref 11.1–15.9)
IMM GRANULOCYTES # BLD AUTO: 0 X10E3/UL (ref 0–0.1)
IMM GRANULOCYTES NFR BLD AUTO: 0 %
INR PPP: 1.1 (ref 0.9–1.2)
LYMPHOCYTES # BLD AUTO: 1.1 X10E3/UL (ref 0.7–3.1)
LYMPHOCYTES NFR BLD AUTO: 21 %
MCH RBC QN AUTO: 31.5 PG (ref 26.6–33)
MCHC RBC AUTO-ENTMCNC: 34.1 G/DL (ref 31.5–35.7)
MCV RBC AUTO: 93 FL (ref 79–97)
MONOCYTES # BLD AUTO: 0.5 X10E3/UL (ref 0.1–0.9)
MONOCYTES NFR BLD AUTO: 9 %
MORPHOLOGY BLD-IMP: NORMAL
NEUTROPHILS # BLD AUTO: 3.8 X10E3/UL (ref 1.4–7)
NEUTROPHILS NFR BLD AUTO: 70 %
PLATELET # BLD AUTO: NORMAL X10E3/UL (ref 150–450)
POTASSIUM SERPL-SCNC: 3.7 MMOL/L (ref 3.5–5.2)
PROT SERPL-MCNC: 8.2 G/DL (ref 6–8.5)
PROTHROMBIN TIME: 11.9 SEC (ref 9.1–12)
RBC # BLD AUTO: 4.66 X10E6/UL (ref 3.77–5.28)
SODIUM SERPL-SCNC: 141 MMOL/L (ref 134–144)
WBC # BLD AUTO: 5.4 X10E3/UL (ref 3.4–10.8)

## 2023-11-16 ENCOUNTER — ANESTHESIA (OUTPATIENT)
Facility: HOSPITAL | Age: 73
End: 2023-11-16
Payer: MEDICARE

## 2023-11-16 ENCOUNTER — ANESTHESIA EVENT (OUTPATIENT)
Facility: HOSPITAL | Age: 73
End: 2023-11-16
Payer: MEDICARE

## 2023-11-16 ENCOUNTER — HOSPITAL ENCOUNTER (OUTPATIENT)
Facility: HOSPITAL | Age: 73
Setting detail: OUTPATIENT SURGERY
Discharge: HOME | End: 2023-11-16
Attending: INTERNAL MEDICINE | Admitting: INTERNAL MEDICINE
Payer: MEDICARE

## 2023-11-16 VITALS
HEART RATE: 61 BPM | RESPIRATION RATE: 15 BRPM | OXYGEN SATURATION: 98 % | DIASTOLIC BLOOD PRESSURE: 54 MMHG | HEIGHT: 65 IN | SYSTOLIC BLOOD PRESSURE: 118 MMHG | WEIGHT: 120.2 LBS | TEMPERATURE: 98 F | BODY MASS INDEX: 20.03 KG/M2

## 2023-11-16 PROCEDURE — 6370000000 HC RX 637 (ALT 250 FOR IP): Performed by: INTERNAL MEDICINE

## 2023-11-16 PROCEDURE — 2709999900 HC NON-CHARGEABLE SUPPLY: Performed by: INTERNAL MEDICINE

## 2023-11-16 PROCEDURE — 7100000011 HC PHASE II RECOVERY - ADDTL 15 MIN: Performed by: INTERNAL MEDICINE

## 2023-11-16 PROCEDURE — 2720000010 HC SURG SUPPLY STERILE: Performed by: INTERNAL MEDICINE

## 2023-11-16 PROCEDURE — 7100000010 HC PHASE II RECOVERY - FIRST 15 MIN: Performed by: INTERNAL MEDICINE

## 2023-11-16 PROCEDURE — 3600007512: Performed by: INTERNAL MEDICINE

## 2023-11-16 PROCEDURE — 6360000002 HC RX W HCPCS: Performed by: NURSE PRACTITIONER

## 2023-11-16 PROCEDURE — 2580000003 HC RX 258: Performed by: INTERNAL MEDICINE

## 2023-11-16 PROCEDURE — 3700000001 HC ADD 15 MINUTES (ANESTHESIA): Performed by: INTERNAL MEDICINE

## 2023-11-16 PROCEDURE — 3600007502: Performed by: INTERNAL MEDICINE

## 2023-11-16 PROCEDURE — 3700000000 HC ANESTHESIA ATTENDED CARE: Performed by: INTERNAL MEDICINE

## 2023-11-16 PROCEDURE — 43244 EGD VARICES LIGATION: CPT | Performed by: INTERNAL MEDICINE

## 2023-11-16 RX ORDER — ONDANSETRON 2 MG/ML
2 INJECTION INTRAMUSCULAR; INTRAVENOUS AS NEEDED
Status: DISCONTINUED | OUTPATIENT
Start: 2023-11-16 | End: 2023-11-16 | Stop reason: HOSPADM

## 2023-11-16 RX ORDER — FENTANYL CITRATE 50 UG/ML
25 INJECTION, SOLUTION INTRAMUSCULAR; INTRAVENOUS AS NEEDED
Status: DISCONTINUED | OUTPATIENT
Start: 2023-11-16 | End: 2023-11-16 | Stop reason: HOSPADM

## 2023-11-16 RX ORDER — SODIUM CHLORIDE 0.9 % (FLUSH) 0.9 %
5-40 SYRINGE (ML) INJECTION PRN
Status: DISCONTINUED | OUTPATIENT
Start: 2023-11-16 | End: 2023-11-16 | Stop reason: HOSPADM

## 2023-11-16 RX ORDER — SODIUM CHLORIDE 0.9 % (FLUSH) 0.9 %
5-40 SYRINGE (ML) INJECTION EVERY 12 HOURS SCHEDULED
Status: DISCONTINUED | OUTPATIENT
Start: 2023-11-16 | End: 2023-11-16 | Stop reason: HOSPADM

## 2023-11-16 RX ORDER — SIMETHICONE 20 MG/.3ML
EMULSION ORAL PRN
Status: DISCONTINUED | OUTPATIENT
Start: 2023-11-16 | End: 2023-11-16 | Stop reason: ALTCHOICE

## 2023-11-16 RX ORDER — SODIUM CHLORIDE 9 MG/ML
INJECTION, SOLUTION INTRAVENOUS PRN
Status: DISCONTINUED | OUTPATIENT
Start: 2023-11-16 | End: 2023-11-16 | Stop reason: HOSPADM

## 2023-11-16 RX ADMIN — SODIUM CHLORIDE: 9 INJECTION, SOLUTION INTRAVENOUS at 15:13

## 2023-11-16 RX ADMIN — PROPOFOL 30 MG: 10 INJECTION, EMULSION INTRAVENOUS at 15:22

## 2023-11-16 RX ADMIN — PROPOFOL 30 MG: 10 INJECTION, EMULSION INTRAVENOUS at 15:19

## 2023-11-16 RX ADMIN — PROPOFOL 30 MG: 10 INJECTION, EMULSION INTRAVENOUS at 15:25

## 2023-11-16 RX ADMIN — PROPOFOL 100 MG: 10 INJECTION, EMULSION INTRAVENOUS at 15:17

## 2023-11-16 ASSESSMENT — PAIN SCALES - GENERAL
PAINLEVEL_OUTOF10: 0
PAINLEVEL_OUTOF10: 0

## 2023-11-16 NOTE — H&P
MD Sarahy, FACP, [place], [place]      MEENU Miner, [facility]   Barby Kelley, [facility]-AG   Sherman Jaime, FNP-C  Nitza Hope, FNP-C   Cheryl Berry, [facility]   [facility]      [address]   at [facility]   1101 [facility], [address] [facility], [facility]   [phone]   FAX: [phone]   at [facility], [address] [facility], [address]   [phone]   FAX: [phone]         PRE-PROCEDURE NOTE - EGD    H and P from last office visit reviewed. Allergies reviewed. Out-patient medicaton list reviewed. Patient Active Problem List   Diagnosis    Chronic ITP (idiopathic thrombocytopenia) (HCC)    Cirrhosis (HCC)    Herpes    Portal hypertension (HCC)    History of esophageal varices with bleeding    Autoimmune hepatitis (HCC)    Thrombocytopenia (HCC)    Anticoagulation management encounter    Chronic atrial fibrillation, unspecified    Typical atrial flutter    Pleural effusion associated with hepatic disorder         Allergies   Allergen Reactions    Aspirin Anaphylaxis    Mushroom Extract Complex Hives       No current facility-administered medications on file prior to encounter. Current Outpatient Medications on File Prior to Encounter   Medication Sig Dispense Refill    metoprolol tartrate (LOPRESSOR) 25 MG tablet Take 0.5 tablets twice a day by oral route.       zoledronic acid (RECLAST) 5 MG/100ML SOLN Infuse 100 mLs intravenously once      flecainide (TAMBOCOR) 50 MG tablet Take 1 tablet by mouth 2 times daily 60 tablet 0    spironolactone (ALDACTONE) 100 MG tablet Take 0.5 tablets by mouth daily 15 tablet 0    furosemide (LASIX) 40 MG tablet Take 1 tablet by mouth daily 30 tablet 3    Multiple Vitamins-Minerals (THERAPEUTIC MULTIVITAMIN-MINERALS) tablet Take 1 tablet by mouth daily      cetirizine (ZYRTEC) 10 MG tablet Take 0.5 tablets by mouth      vitamin D 25 MCG (1000 UT) CAPS Take 1 capsule by mouth daily      ferrous sulfate (IRON 325) 325 (65 Fe) MG tablet Take by mouth      predniSONE (DELTASONE) 1 MG tablet Take 5 tablets by mouth daily      valACYclovir (VALTREX) 500 MG tablet Take 1 tablet by mouth every other day         For EGD to assess for esophageal and gastric varices. Plan to perform banding if indicated based upon variceal size and appearance. PHYSICAL EXAMINATION:  Vital signs per nursing and anesthesia records      General: No acute distress. Eyes: Sclera anicteric. ENT: No oral lesions. Thyroid normal.  Nodes: No adenopathy. Skin: No spider angiomata. No jaundice. No palmar erythema. Respiratory: Lungs clear to auscultation. Cardiovascular: Regular heart rate. No murmurs. No JVD. Abdomen: Soft non-tender, liver size normal to percussion/palpation. Spleen not palpable. No obvious ascites. Extremities: No edema. No muscle wasting. No gross arthritic changes. Neurologic: Alert and oriented. Cranial nerves grossly intact. No asterixis. MOST RECENT LABORATORY STUDIES:  Lab Results   Component Value Date    WBC 5.4 09/28/2023    HGB 14.7 09/28/2023    PLT Comment 09/28/2023     Lab Results   Component Value Date    AST 51 (H) 09/28/2023    ALT 34 (H) 09/28/2023    BILITOT 1.0 09/28/2023     Lab Results   Component Value Date    INR 1.1 09/28/2023          PRE-PROCEDURE DOCUMENTATION  The risks of the procedure were discussed with the patient. These included reaction to anesthesia, pain, perforation and bleeding. All questions were answered. The patient wishes to proceed with the procedure. ASA status  3    Airway assessment:   Mouth opening:  3   Mallampati:  3   Dentition:  Intact     ASSESSMENT AND PLAN:  EGD to assess for esophageal and/or gastric varices.   Sedation per

## 2023-11-16 NOTE — ANESTHESIA POSTPROCEDURE EVALUATION
Department of Anesthesiology  Postprocedure Note    Patient: Wood Vega  MRN: [id number]  YOB: 1950  Date of evaluation: 11/16/2023      Procedure Summary     Date: 11/16/23 Room / Location: [facility] ENDO 02 / [facility] ENDOSCOPY    Anesthesia Start: 1513 Anesthesia Stop: 1    Procedure: EGD ESOPHAGOGASTRODUODENOSCOPY (Upper GI Region) Diagnosis:       Cirrhosis of liver with ascites, unspecified hepatic cirrhosis type ([address])      (Cirrhosis of liver with ascites, unspecified hepatic cirrhosis type ([address]) [K74.60, R18.8])    Surgeons: Milagro Nash MD Responsible Provider: Chad Rider MD    Anesthesia Type: MAC ASA Status: 3          Anesthesia Type: MAC    Agustín Phase I: Agustín Score: 10    Agustín Phase II: Agustín Score: 10      Anesthesia Post Evaluation    Patient location during evaluation: PACU  Patient participation: complete - patient participated  Level of consciousness: awake  Airway patency: patent  Nausea & Vomiting: no nausea  Complications: no  Cardiovascular status: blood pressure returned to baseline and hemodynamically stable  Respiratory status: acceptable  Hydration status: stable

## 2023-11-16 NOTE — OP NOTE
to the esophagus. Banding of esophageal varices was performed as described below. The scope was then removed. FINDINGS:  Esophagus:    A few Medium varices were present. There was signs of impending or recent bleeding which included red whale markings, hemocystic spots,  Banding of esophageal varices was performed. Good hemostsis with no active bleeding was observed at the end of the procedure. Scars of previous banding. Stomach:   Mild portal hypertensive gastropathy of the body of the stomach  Polyps vs gastric varices identified in cardia  Gastric polyp in antrum. Not removed because of banding  Duodenum:   Normal bulb and second portion    SPECIMENS COLLECTED:   None    INTERVENTIONS:   3 bands placed were placed on esophageal varices. COMPLICATIONS: None. The patient tolerated the procedure well. EBL: Negligible. RECOMMENDATIONS:  Observe until discharge parameters are achieved. Liquid diet today. Soft food tomorrow. Resume general diet thereafter. Repeat endoscopy to reassess varices and need for additional banding in 2 months. Follow-up [facility] office as scheduled.       Mee Tineo MD  1 [facility] of [address] [place] [address], [address] [place] [address]  [phone]  [facility]

## 2023-11-16 NOTE — PROGRESS NOTES

## 2023-11-16 NOTE — DISCHARGE INSTRUCTIONS
MD Sarahy, FACP, [place], [place]      MEENU Miner, [facility]BC   Barby Kelley, [facility]-   Sherman Jaime, FNKIERRA-JUANY Hope, FNP-C   Cheryl Berry, [facility]   [facility]      [address]   at [facility]   [address] [facility], [address] [facility], [address] [place] [address]   [phone]   FAX: [phone]   at [facility], 3 [facility], [address] [place] [address] Road   [phone]   FAX: [phone]         ENDOSCOPY WITH BANDING DISCHARGE INSTRUCTIONS    Hina Rowley    1950  Date: 11/16/2023    DISCOMFORT:  Use lozenges or warm salt water gargle for sore thoat  Apply warm compress to IV site if red. If redness or soreness persists call the office. You may experience gas and bloating. Walking and belching will help relieve this. You may experience chest pain or discomfort or feel as though food is \"sticking\" in your food pipe for a few days after the procedure. This is a normal feeling after banding of esophageal varices. CHEST PRESSURE:  Banding of dilated veins (varices) in the food tube (esophagus) can be painful in some persons. The pain is caused by squeezing the varices and lining of the esophagus by the bands used to seal the varices. You can take liquid pain medication either acetaminophen or alternative. The best treatment for this is to drink a an \"Icee\" or \"Slurpee\" since the ice crystals will freeze the nerve endings in the food tube and relieve the pain. DIET:  Regular food may dislodge the bands placed on the varices. For this reason you should only have liquid for the rest of today. Eat only soft food that does not need to be chewed all day tomorrow. You may advance to your regular diet in 2 days.     ACTIVITY:  Spend the remainder of the day

## 2023-11-22 ENCOUNTER — TELEPHONE (OUTPATIENT)
Age: 73
End: 2023-11-22

## 2023-11-22 NOTE — TELEPHONE ENCOUNTER
[email or web address]  from [facility] to notify Gene Adas but she is out of office. Patient has a large right pleural effusion and will possibly need thoracentesis. Will call  with this information.  number is [phone]. Please advise next step on this patient. Message sent to [address] [place] [address]. (KF

## 2023-12-01 ENCOUNTER — CLINICAL DOCUMENTATION (OUTPATIENT)
Age: 73
End: 2023-12-01

## 2023-12-28 ENCOUNTER — OFFICE VISIT (OUTPATIENT)
Age: 73
End: 2023-12-28
Payer: MEDICARE

## 2023-12-28 VITALS
OXYGEN SATURATION: 93 % | HEART RATE: 66 BPM | BODY MASS INDEX: 19.49 KG/M2 | WEIGHT: 117 LBS | TEMPERATURE: 98.2 F | SYSTOLIC BLOOD PRESSURE: 85 MMHG | DIASTOLIC BLOOD PRESSURE: 58 MMHG | HEIGHT: 65 IN

## 2023-12-28 DIAGNOSIS — K74.60 CIRRHOSIS OF LIVER WITH ASCITES, UNSPECIFIED HEPATIC CIRRHOSIS TYPE (HCC): Primary | ICD-10-CM

## 2023-12-28 DIAGNOSIS — R18.8 CIRRHOSIS OF LIVER WITH ASCITES, UNSPECIFIED HEPATIC CIRRHOSIS TYPE (HCC): Primary | ICD-10-CM

## 2023-12-28 PROCEDURE — 1123F ACP DISCUSS/DSCN MKR DOCD: CPT | Performed by: NURSE PRACTITIONER

## 2023-12-28 PROCEDURE — 99214 OFFICE O/P EST MOD 30 MIN: CPT | Performed by: NURSE PRACTITIONER

## 2023-12-28 PROCEDURE — 1090F PRES/ABSN URINE INCON ASSESS: CPT | Performed by: NURSE PRACTITIONER

## 2023-12-28 PROCEDURE — 3017F COLORECTAL CA SCREEN DOC REV: CPT | Performed by: NURSE PRACTITIONER

## 2023-12-28 PROCEDURE — G8427 DOCREV CUR MEDS BY ELIG CLIN: HCPCS | Performed by: NURSE PRACTITIONER

## 2023-12-28 PROCEDURE — 1036F TOBACCO NON-USER: CPT | Performed by: NURSE PRACTITIONER

## 2023-12-28 PROCEDURE — G8484 FLU IMMUNIZE NO ADMIN: HCPCS | Performed by: NURSE PRACTITIONER

## 2023-12-28 PROCEDURE — G8400 PT W/DXA NO RESULTS DOC: HCPCS | Performed by: NURSE PRACTITIONER

## 2023-12-28 PROCEDURE — G8420 CALC BMI NORM PARAMETERS: HCPCS | Performed by: NURSE PRACTITIONER

## 2023-12-28 PROCEDURE — 43235 EGD DIAGNOSTIC BRUSH WASH: CPT | Performed by: NURSE PRACTITIONER

## 2023-12-28 RX ORDER — SPIRONOLACTONE 100 MG/1
50 TABLET, FILM COATED ORAL DAILY
Qty: 15 TABLET | Refills: 0 | Status: SHIPPED | OUTPATIENT
Start: 2023-12-28 | End: 2024-01-27

## 2023-12-28 RX ORDER — FUROSEMIDE 40 MG/1
40 TABLET ORAL DAILY
Qty: 30 TABLET | Refills: 3 | Status: SHIPPED | OUTPATIENT
Start: 2023-12-28

## 2023-12-29 DIAGNOSIS — R18.8 CIRRHOSIS OF LIVER WITH ASCITES, UNSPECIFIED HEPATIC CIRRHOSIS TYPE (HCC): Primary | ICD-10-CM

## 2023-12-29 DIAGNOSIS — K74.60 CIRRHOSIS OF LIVER WITH ASCITES, UNSPECIFIED HEPATIC CIRRHOSIS TYPE (HCC): Primary | ICD-10-CM

## 2023-12-29 LAB
AFP L3 MFR SERPL: NORMAL % (ref 0–9.9)
AFP SERPL-MCNC: 2.5 NG/ML (ref 0–9.2)
ALBUMIN SERPL-MCNC: 3.7 G/DL (ref 3.8–4.8)
ALP SERPL-CCNC: 125 IU/L (ref 44–121)
ALT SERPL-CCNC: 36 IU/L (ref 0–32)
AST SERPL-CCNC: 47 IU/L (ref 0–40)
BASOPHILS # BLD AUTO: 0 X10E3/UL (ref 0–0.2)
BASOPHILS NFR BLD AUTO: 0 %
BILIRUB DIRECT SERPL-MCNC: 0.44 MG/DL (ref 0–0.4)
BILIRUB SERPL-MCNC: 1.2 MG/DL (ref 0–1.2)
BUN SERPL-MCNC: 17 MG/DL (ref 8–27)
BUN/CREAT SERPL: 22 (ref 12–28)
CALCIUM SERPL-MCNC: 9.1 MG/DL (ref 8.7–10.3)
CHLORIDE SERPL-SCNC: 103 MMOL/L (ref 96–106)
CO2 SERPL-SCNC: 25 MMOL/L (ref 20–29)
CREAT SERPL-MCNC: 0.77 MG/DL (ref 0.57–1)
EGFRCR SERPLBLD CKD-EPI 2021: 80 ML/MIN/1.73
EOSINOPHIL # BLD AUTO: 0 X10E3/UL (ref 0–0.4)
EOSINOPHIL NFR BLD AUTO: 0 %
ERYTHROCYTE [DISTWIDTH] IN BLOOD BY AUTOMATED COUNT: 12.7 % (ref 11.7–15.4)
GLUCOSE SERPL-MCNC: 76 MG/DL (ref 70–99)
HCT VFR BLD AUTO: 41.4 % (ref 34–46.6)
HGB BLD-MCNC: 14.2 G/DL (ref 11.1–15.9)
IMM GRANULOCYTES # BLD AUTO: 0 X10E3/UL (ref 0–0.1)
IMM GRANULOCYTES NFR BLD AUTO: 1 %
INR PPP: 1.1 (ref 0.9–1.2)
LYMPHOCYTES # BLD AUTO: 0.8 X10E3/UL (ref 0.7–3.1)
LYMPHOCYTES NFR BLD AUTO: 15 %
MCH RBC QN AUTO: 32 PG (ref 26.6–33)
MCHC RBC AUTO-ENTMCNC: 34.3 G/DL (ref 31.5–35.7)
MCV RBC AUTO: 93 FL (ref 79–97)
MONOCYTES # BLD AUTO: 0.3 X10E3/UL (ref 0.1–0.9)
MONOCYTES NFR BLD AUTO: 6 %
MORPHOLOGY BLD-IMP: NORMAL
NEUTROPHILS # BLD AUTO: 4.1 X10E3/UL (ref 1.4–7)
NEUTROPHILS NFR BLD AUTO: 78 %
PLATELET # BLD AUTO: NORMAL X10E3/UL
POTASSIUM SERPL-SCNC: 3.7 MMOL/L (ref 3.5–5.2)
PROT SERPL-MCNC: 7.6 G/DL (ref 6–8.5)
PROTHROMBIN TIME: 11.6 SEC (ref 9.1–12)
RBC # BLD AUTO: 4.44 X10E6/UL (ref 3.77–5.28)
SODIUM SERPL-SCNC: 142 MMOL/L (ref 134–144)
WBC # BLD AUTO: 5.2 X10E3/UL (ref 3.4–10.8)

## 2024-01-15 ENCOUNTER — HOSPITAL ENCOUNTER (OUTPATIENT)
Facility: HOSPITAL | Age: 74
Discharge: HOME | End: 2024-01-18
Payer: MEDICARE

## 2024-01-15 DIAGNOSIS — R18.8 CIRRHOSIS OF LIVER WITH ASCITES, UNSPECIFIED HEPATIC CIRRHOSIS TYPE (HCC): ICD-10-CM

## 2024-01-15 DIAGNOSIS — K74.60 CIRRHOSIS OF LIVER WITH ASCITES, UNSPECIFIED HEPATIC CIRRHOSIS TYPE (HCC): ICD-10-CM

## 2024-01-15 PROCEDURE — 76700 US EXAM ABDOM COMPLETE: CPT

## 2024-01-25 RX ORDER — SPIRONOLACTONE 100 MG/1
50 TABLET, FILM COATED ORAL DAILY
Qty: 45 TABLET | Refills: 3 | Status: SHIPPED | OUTPATIENT
Start: 2024-01-25

## 2024-02-14 ENCOUNTER — PREP FOR PROCEDURE (OUTPATIENT)
Age: 74
End: 2024-02-14

## 2024-02-14 PROBLEM — R18.8 CIRRHOSIS OF LIVER WITH ASCITES (HCC): Status: ACTIVE | Noted: 2024-02-14

## 2024-02-14 PROBLEM — R18.8 ASCITES: Status: ACTIVE | Noted: 2024-02-14

## 2024-02-14 PROBLEM — K74.60 CIRRHOSIS OF LIVER WITH ASCITES (HCC): Status: ACTIVE | Noted: 2024-02-14

## 2024-03-21 ENCOUNTER — ANESTHESIA (OUTPATIENT)
Facility: HOSPITAL | Age: 74
End: 2024-03-21
Payer: MEDICARE

## 2024-03-21 ENCOUNTER — HOSPITAL ENCOUNTER (OUTPATIENT)
Facility: HOSPITAL | Age: 74
Setting detail: OUTPATIENT SURGERY
Discharge: HOME | End: 2024-03-21
Attending: INTERNAL MEDICINE | Admitting: INTERNAL MEDICINE
Payer: MEDICARE

## 2024-03-21 ENCOUNTER — ANESTHESIA EVENT (OUTPATIENT)
Facility: HOSPITAL | Age: 74
End: 2024-03-21
Payer: MEDICARE

## 2024-03-21 VITALS
OXYGEN SATURATION: 98 % | DIASTOLIC BLOOD PRESSURE: 54 MMHG | BODY MASS INDEX: 20.83 KG/M2 | HEIGHT: 65 IN | WEIGHT: 125 LBS | SYSTOLIC BLOOD PRESSURE: 99 MMHG | TEMPERATURE: 97 F | RESPIRATION RATE: 17 BRPM | HEART RATE: 62 BPM

## 2024-03-21 PROCEDURE — 7100000011 HC PHASE II RECOVERY - ADDTL 15 MIN: Performed by: INTERNAL MEDICINE

## 2024-03-21 PROCEDURE — 2580000003 HC RX 258: Performed by: NURSE ANESTHETIST, CERTIFIED REGISTERED

## 2024-03-21 PROCEDURE — 43251 EGD REMOVE LESION SNARE: CPT | Performed by: INTERNAL MEDICINE

## 2024-03-21 PROCEDURE — 3700000000 HC ANESTHESIA ATTENDED CARE: Performed by: INTERNAL MEDICINE

## 2024-03-21 PROCEDURE — 88305 TISSUE EXAM BY PATHOLOGIST: CPT

## 2024-03-21 PROCEDURE — 2709999900 HC NON-CHARGEABLE SUPPLY: Performed by: INTERNAL MEDICINE

## 2024-03-21 PROCEDURE — 3600007502: Performed by: INTERNAL MEDICINE

## 2024-03-21 PROCEDURE — 88342 IMHCHEM/IMCYTCHM 1ST ANTB: CPT

## 2024-03-21 PROCEDURE — 7100000010 HC PHASE II RECOVERY - FIRST 15 MIN: Performed by: INTERNAL MEDICINE

## 2024-03-21 RX ORDER — SODIUM CHLORIDE 0.9 % (FLUSH) 0.9 %
5-40 SYRINGE (ML) INJECTION EVERY 12 HOURS SCHEDULED
Status: DISCONTINUED | OUTPATIENT
Start: 2024-03-21 | End: 2024-03-21 | Stop reason: HOSPADM

## 2024-03-21 RX ORDER — SODIUM CHLORIDE 0.9 % (FLUSH) 0.9 %
5-40 SYRINGE (ML) INJECTION PRN
Status: DISCONTINUED | OUTPATIENT
Start: 2024-03-21 | End: 2024-03-21 | Stop reason: HOSPADM

## 2024-03-21 RX ORDER — SODIUM CHLORIDE 9 MG/ML
INJECTION, SOLUTION INTRAVENOUS CONTINUOUS PRN
Status: DISCONTINUED | OUTPATIENT
Start: 2024-03-21 | End: 2024-03-21 | Stop reason: SDUPTHER

## 2024-03-21 RX ORDER — LIDOCAINE HYDROCHLORIDE 20 MG/ML
INJECTION, SOLUTION EPIDURAL; INFILTRATION; INTRACAUDAL; PERINEURAL PRN
Status: DISCONTINUED | OUTPATIENT
Start: 2024-03-21 | End: 2024-03-21 | Stop reason: SDUPTHER

## 2024-03-21 RX ORDER — ONDANSETRON 2 MG/ML
2 INJECTION INTRAMUSCULAR; INTRAVENOUS AS NEEDED
Status: DISCONTINUED | OUTPATIENT
Start: 2024-03-21 | End: 2024-03-21 | Stop reason: HOSPADM

## 2024-03-21 RX ORDER — FENTANYL CITRATE 50 UG/ML
25 INJECTION, SOLUTION INTRAMUSCULAR; INTRAVENOUS AS NEEDED
Status: DISCONTINUED | OUTPATIENT
Start: 2024-03-21 | End: 2024-03-21 | Stop reason: HOSPADM

## 2024-03-21 RX ORDER — SODIUM CHLORIDE 9 MG/ML
INJECTION, SOLUTION INTRAVENOUS PRN
Status: DISCONTINUED | OUTPATIENT
Start: 2024-03-21 | End: 2024-03-21 | Stop reason: HOSPADM

## 2024-03-21 RX ADMIN — LIDOCAINE HYDROCHLORIDE 50 MG: 20 INJECTION, SOLUTION EPIDURAL; INFILTRATION; INTRACAUDAL; PERINEURAL at 15:50

## 2024-03-21 RX ADMIN — SODIUM CHLORIDE: 9 INJECTION, SOLUTION INTRAVENOUS at 15:37

## 2024-03-21 NOTE — PROGRESS NOTES

## 2024-03-21 NOTE — ANESTHESIA PRE PROCEDURE
Department of Anesthesiology  Preprocedure Note       Name:  Flora Serrano   Age:  73 y.o.  :  1950                                          MRN:  [id number]         Date:  3/21/2024      Surgeon: Surgeon(s):  Pb Apple MD    Procedure: Procedure(s):  ESOPHAGOGASTRODUODENOSCOPY DIAGNOSTIC ONLY    Medications prior to admission:   Prior to Admission medications    Medication Sig Start Date End Date Taking? Authorizing Provider   spironolactone (ALDACTONE) 100 MG tablet TAKE 1/2 TABLET DAILY 24   Georgia Rodas APRN - NP   furosemide (LASIX) 40 MG tablet Take 1 tablet by mouth daily 23   Georgia Rodas APRN - NP   metoprolol tartrate (LOPRESSOR) 25 MG tablet Take 0.5 tablets twice a day by oral route. 23   ProviderSiomara MD   zoledronic acid (RECLAST) 5 MG/100ML SOLN Infuse 100 mLs intravenously once    Siomara Soni MD   flecainide (TAMBOCOR) 50 MG tablet Take 1 tablet by mouth 2 times daily 5/15/23 3/21/24  Renae Pace MD   Multiple Vitamins-Minerals (THERAPEUTIC MULTIVITAMIN-MINERALS) tablet Take 1 tablet by mouth daily    ProviderSiomara MD   cetirizine (ZYRTEC) 10 MG tablet Take 0.5 tablets by mouth    Automatic Reconciliation, Ar   vitamin D 25 MCG (1000 UT) CAPS Take 1 capsule by mouth daily    Automatic Reconciliation, Ar   ferrous sulfate (IRON 325) 325 (65 Fe) MG tablet Take by mouth    Automatic Reconciliation, Ar   predniSONE (DELTASONE) 1 MG tablet Take 5 tablets by mouth daily 11   Automatic Reconciliation, Ar   valACYclovir (VALTREX) 500 MG tablet Take 1 tablet by mouth every other day    Automatic Reconciliation, Ar       Current medications:    Current Facility-Administered Medications   Medication Dose Route Frequency Provider Last Rate Last Admin    sodium chloride flush 0.9 % injection 5-40 mL  5-40 mL IntraVENous 2 times per day Pb Apple MD        sodium chloride flush 0.9 % injection 5-40 mL  5-40 mL

## 2024-03-21 NOTE — DISCHARGE INSTRUCTIONS
[facility]      Pb Apple MD, FACP, FACG, FAASLD      Shilpa Welch, PA-C    Maria Caro, [facility]   Nan Guallpachaimelissa, [facility]   Georgia Clark, [facility]  Pedro Barrett, [facility]   Toshia Raphael, [facility]   Birdie Laughlin, [facility]   [address]   [place]  [address] [phone]   FAX: [phone]  [facility]   [address]   [place]  [address] [phone]   FAX: [phone]         ENDOSCOPY DISCHARGE INSTRUCTIONS      Flora Serrano  1950  Date: 3/21/2024    DISCOMFORT:  Use lozenges or warm salt water gargle for sore thoat  Apply warm compress to IV site if red.  If redness or soreness persists call the office.  You may experience gas and bloating.  Walking and belching will help relieve this.  You may experience chest discomfort or pressure especially if banding of esophageal varices was performed.    DIET:  You may resume your normal diet.    ACTIVITY:  Spend the remainder of the day resting.  Avoid any strenuous activity.  You may not operate a vehicle for 12 hours.  You may not engage in an occupation involving machinery or appliances for rest of today.   Avoid making any critical or financial decisions for at least 24 hour.    Call the [facility] Office if you have any of the following:  Increasing chest or abdominal pain, nausea, vomiting, vomiting blood, abdominal distension or swelling, fever or chills, bloody discharge from nose or mouth or shortness of breath.    Follow-up Instructions:  Call Dr. Apple for any questions or problems at the phone number listed above.  If a biopsy was performed, you will be contacted by the office staff or Dr Apple within 1 week.   If you have not heard from us by then you may call the

## 2024-03-21 NOTE — H&P
[facility]      Pb Apple MD, FACP, FACG, FAASLD      MEENU Rodriguez, [facility]   Nan Junior, [facility]   Georgia Rodas, [facility]-  Pedro Barrett, [facility]   Toshia Raphael, [facility]   Birdie Aston, [facility]   [address]   [place]  [address] [phone]   FAX: [phone]  [facility]   [address]   [place]  [address] [phone]   FAX: [phone]         PRE-PROCEDURE NOTE - EGD    H and P from last office visit reviewed.    Allergies reviewed.  Out-patient medicaton list reviewed.    Patient Active Problem List   Diagnosis    Chronic ITP (idiopathic thrombocytopenia) (HCC)    Cirrhosis (HCC)    Herpes    Portal hypertension (HCC)    History of esophageal varices with bleeding    Autoimmune hepatitis (HCC)    Thrombocytopenia (HCC)    Anticoagulation management encounter    Chronic atrial fibrillation, unspecified    Typical atrial flutter    Pleural effusion associated with hepatic disorder    Cirrhosis of liver with ascites (HCC)    Ascites         Allergies   Allergen Reactions    Mushroom Extract Complex Hives and Rash       No current facility-administered medications on file prior to encounter.     Current Outpatient Medications on File Prior to Encounter   Medication Sig Dispense Refill    spironolactone (ALDACTONE) 100 MG tablet TAKE 1/2 TABLET DAILY 45 tablet 3    furosemide (LASIX) 40 MG tablet Take 1 tablet by mouth daily 30 tablet 3    metoprolol tartrate (LOPRESSOR) 25 MG tablet Take 0.5 tablets twice a day by oral route.      zoledronic acid (RECLAST) 5 MG/100ML SOLN Infuse 100 mLs intravenously once      flecainide (TAMBOCOR) 50 MG tablet Take 1 tablet by mouth 2 times daily 60 tablet 0    Multiple  Vitamins-Minerals (THERAPEUTIC MULTIVITAMIN-MINERALS) tablet Take 1 tablet by mouth daily      cetirizine (ZYRTEC) 10 MG tablet Take 0.5 tablets by mouth      vitamin D 25 MCG (1000 UT) CAPS Take 1 capsule by mouth daily      ferrous sulfate (IRON 325) 325 (65 Fe) MG tablet Take by mouth      predniSONE (DELTASONE) 1 MG tablet Take 5 tablets by mouth daily      valACYclovir (VALTREX) 500 MG tablet Take 1 tablet by mouth every other day         For EGD to assess for esophageal and gastric varices.  Plan to perform banding if indicated based upon variceal size and appearance.    PHYSICAL EXAMINATION:  Vital signs per nursing and anesthesia records      General: No acute distress.   Eyes: Sclera anicteric.   ENT: No oral lesions.  Thyroid normal.  Nodes: No adenopathy.   Skin: No spider angiomata.  No jaundice.  No palmar erythema.  Respiratory: Lungs clear to auscultation.   Cardiovascular: Regular heart rate.  No murmurs.  No JVD.  Abdomen: Soft non-tender, liver size normal to percussion/palpation.  Spleen not palpable. No obvious ascites.  Extremities: No edema.  No muscle wasting.  No gross arthritic changes.  Neurologic: Alert and oriented.  Cranial nerves grossly intact.  No asterixis.      MOST RECENT LABORATORY STUDIES:  Lab Results   Component Value Date    WBC 5.2 12/28/2023    HGB 14.2 12/28/2023    PLT CANCELED 12/28/2023     Lab Results   Component Value Date    AST 47 (H) 12/28/2023    ALT 36 (H) 12/28/2023    BILITOT 1.2 12/28/2023     Lab Results   Component Value Date    INR 1.1 12/28/2023          PRE-PROCEDURE DOCUMENTATION  The risks of the procedure were discussed with the patient.  These included reaction to anesthesia, pain, perforation and bleeding.    All questions were answered.    The patient wishes to proceed with the procedure.    ASA status  3    Airway assessment:   Mouth opening:  3   Mallampati:  3   Dentition:  Intact     ASSESSMENT AND PLAN:  EGD to assess for esophageal and/or

## 2024-03-21 NOTE — OP NOTE
[facility]      Pb Apple MD, FACP, FACG, FAASLD      Shilpa Welch, PA-JUANY Caro, [facility]   Nan Guallpaantony, [facility]   Georgia Clark, [facility]-  Pedro Barrett, [facility]   Toshia Raphael, [facility]   Birdie Laughlin, [facility]   [address]   [place]  [address] [phone]   FAX: [phone]  [facility]   [address]   [place]  [address] [phone]   FAX: [phone]         UPPER ENDOSCOPY PROCEDURE NOTE    NAME: Flora Serrano  :  1950  MRN:  [id number]    INDICATION: Cirrhosis.  Screening for esophageal varices with variceal ligation if  indicated.    : Pb Apple MD    SURGICAL ASSISTANT:  None    PROSTHETIC DEVISES, TISSUE GRAFTS, ORGAN TRANSPLANTS:  Not applicable     ANESTHESIA/SEDATION: MAC Sedation per anesthesiology         PROCEDURE DESCRIPTION:  [employer] consent was obtained from the patient for the procedure.  All risks and benefits of the procedure explained.     The procedure was performed in the endoscopy suite.  The patient was laying on a stretcher and moved to the left lateral decubitus position prior to administration of sedation.    Sedation was administered by anesthesiology.  See their note for details.      The endoscope was inserted into the mouth and advanced under direct vision to the second portion of the duodenum.  Careful inspection of upper gastrointestinal tract was made as the endoscope was inserted and withdrawn.  Retroflexion of the endoscope to view of the cardia of the stomach was performed.  The findings and interventions are described below.      FINDINGS:  Esophagus:    Normal.      Stomach:   No portal hypertensive gastropathy   Polyp in the antrum  No gastric varices

## 2024-03-23 NOTE — ANESTHESIA POSTPROCEDURE EVALUATION
Department of Anesthesiology  Postprocedure Note    Patient: Flora Serrano  MRN: [id number]  YOB: 1950  Date of evaluation: 3/23/2024    Procedure Summary       Date: 03/21/24 Room / Location: [facility] ENDO 04 / [facility] ENDOSCOPY    Anesthesia Start: 1548 Anesthesia Stop: 1601    Procedure: ESOPHAGOGASTRODUODENOSCOPY DIAGNOSTIC ONLY (Upper GI Region) Diagnosis:       Cirrhosis of liver with ascites (HCC)      Ascites      (Cirrhosis of liver with ascites (HCC) [K74.60, R18.8])      (Ascites [R18.8])    Surgeons: Pb Apple MD Responsible Provider: Damien Tavares MD    Anesthesia Type: MAC ASA Status: 3            Anesthesia Type: MAC    Agustín Phase I:      Agustín Phase II: Agustín Score: 9    Anesthesia Post Evaluation    Patient location during evaluation: PACU  Patient participation: complete - patient participated  Level of consciousness: awake and alert  Airway patency: patent  Nausea & Vomiting: no nausea  Cardiovascular status: hemodynamically stable  Respiratory status: acceptable  Hydration status: euvolemic        No notable events documented.

## 2024-04-10 ENCOUNTER — OFFICE VISIT (OUTPATIENT)
Age: 74
End: 2024-04-10
Payer: MEDICARE

## 2024-04-10 VITALS
TEMPERATURE: 97.2 F | OXYGEN SATURATION: 96 % | SYSTOLIC BLOOD PRESSURE: 102 MMHG | HEART RATE: 72 BPM | WEIGHT: 124.8 LBS | HEIGHT: 65 IN | BODY MASS INDEX: 20.79 KG/M2 | DIASTOLIC BLOOD PRESSURE: 56 MMHG | RESPIRATION RATE: 16 BRPM

## 2024-04-10 DIAGNOSIS — R18.8 CIRRHOSIS OF LIVER WITH ASCITES, UNSPECIFIED HEPATIC CIRRHOSIS TYPE (HCC): Primary | ICD-10-CM

## 2024-04-10 DIAGNOSIS — K74.60 CIRRHOSIS OF LIVER WITH ASCITES, UNSPECIFIED HEPATIC CIRRHOSIS TYPE (HCC): Primary | ICD-10-CM

## 2024-04-10 PROCEDURE — 99214 OFFICE O/P EST MOD 30 MIN: CPT | Performed by: NURSE PRACTITIONER

## 2024-04-10 PROCEDURE — 43235 EGD DIAGNOSTIC BRUSH WASH: CPT | Performed by: NURSE PRACTITIONER

## 2024-04-10 ASSESSMENT — PATIENT HEALTH QUESTIONNAIRE - PHQ9
2. FEELING DOWN, DEPRESSED OR HOPELESS: NOT AT ALL
SUM OF ALL RESPONSES TO PHQ QUESTIONS 1-9: 0
SUM OF ALL RESPONSES TO PHQ QUESTIONS 1-9: 0
1. LITTLE INTEREST OR PLEASURE IN DOING THINGS: NOT AT ALL
SUM OF ALL RESPONSES TO PHQ QUESTIONS 1-9: 0
SUM OF ALL RESPONSES TO PHQ9 QUESTIONS 1 & 2: 0
SUM OF ALL RESPONSES TO PHQ QUESTIONS 1-9: 0

## 2024-04-10 NOTE — PROGRESS NOTES
[facility]      Pb Apple MD, FACP, FACG, FAASLD      MEENU Rodriguez, [facility]-BC   Nan Pacheco, [facility]   Georgia Rodas, FNP-C  Pedro Barrett, FNP-C   Toshia Raphael, [facility]   at [facility]   [address]   [place]  [address] [phone]   FAX: [phone]  [facility]   [address]   [place]  [address] [phone]   FAX: [phone]       Patient Care Team:  Melissa Diehl MD as PCP - General      Patient Active Problem List   Diagnosis    Chronic ITP (idiopathic thrombocytopenia) (HCC)    Cirrhosis (HCC)    Herpes    Portal hypertension (HCC)    History of esophageal varices with bleeding    Autoimmune hepatitis (HCC)    Thrombocytopenia (HCC)    Anticoagulation management encounter    Chronic atrial fibrillation, unspecified    Typical atrial flutter    Pleural effusion associated with hepatic disorder    Cirrhosis of liver with ascites (HCC)    Ascites         Flora BRANTLEY Flintstone is being seen at [facility] for 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 The active problem list, all pertinent past medical history, medications, liver histology, endoscopic studies, radiologic findings and laboratory findings related to the liver disorder were reviewed and discussed with the patient.      The patient is a 73 y.o.  female who was found to have abnormalities in liver transaminases and alkaline phosphatase in 1989.     AIH has been in biochemical remission for 20+ years on low dose prednisone utilized to treat ITP     She was found to have cirrhosis in about 2020.    No new hepatic hydrothorax since 5/2023.  She had an EGD in 8/2023 4 bands placed.  Next EGD is scheduled for 11/16/2023    She states she

## 2024-04-10 NOTE — PROGRESS NOTES
Room:   I have reviewed all needed documentation in preparation for visit. Verified patient by name and date of birth  Flora Serrano is a 73 y.o. female Follow-up (3 month follow up)  .  Vitals:    04/10/24 1324   BP: (!) 102/56   Pulse: 72   Resp: 16   Temp: 97.2 °F (36.2 °C)   SpO2: 96%       No LMP recorded. (Menstrual status: Menopause).     Patient just wants to know results of her testing. She states that she saw it on the portal but did not understand the results.   Patient would like to know if it is acceptable to take more than one spirolactone if she is above 125lbs.    Health Maintenance Review: Patient reminded of \"due or due soon\" health maintenance. I have asked the patient to contact his/her primary care provider (PCP) for follow-up on his/her health maintenance.    \"Have you been to the ER, urgent care clinic since your last visit?  Hospitalized since your last visit?\"    NO    “Have you seen or consulted any other health care providers outside of [facility] since your last visit?”    NO

## 2024-04-11 ENCOUNTER — CLINICAL DOCUMENTATION (OUTPATIENT)
Age: 74
End: 2024-04-11

## 2024-04-11 DIAGNOSIS — K86.9 PANCREATIC LESION: ICD-10-CM

## 2024-04-11 DIAGNOSIS — R74.8 ELEVATED ALKALINE PHOSPHATASE LEVEL: Primary | ICD-10-CM

## 2024-04-11 LAB
ALBUMIN SERPL-MCNC: 3.5 G/DL (ref 3.8–4.8)
ALP SERPL-CCNC: 161 IU/L (ref 44–121)
ALT SERPL-CCNC: 31 IU/L (ref 0–32)
AST SERPL-CCNC: 45 IU/L (ref 0–40)
BASOPHILS # BLD AUTO: 0 X10E3/UL (ref 0–0.2)
BASOPHILS NFR BLD AUTO: 0 %
BILIRUB DIRECT SERPL-MCNC: 0.45 MG/DL (ref 0–0.4)
BILIRUB SERPL-MCNC: 1.1 MG/DL (ref 0–1.2)
BUN SERPL-MCNC: 13 MG/DL (ref 8–27)
BUN/CREAT SERPL: 20 (ref 12–28)
CALCIUM SERPL-MCNC: 9.7 MG/DL (ref 8.7–10.3)
CHLORIDE SERPL-SCNC: 100 MMOL/L (ref 96–106)
CO2 SERPL-SCNC: 19 MMOL/L (ref 20–29)
CREAT SERPL-MCNC: 0.64 MG/DL (ref 0.57–1)
EGFRCR SERPLBLD CKD-EPI 2021: 92 ML/MIN/1.73
EOSINOPHIL # BLD AUTO: 0 X10E3/UL (ref 0–0.4)
EOSINOPHIL NFR BLD AUTO: 0 %
ERYTHROCYTE [DISTWIDTH] IN BLOOD BY AUTOMATED COUNT: 15 % (ref 11.7–15.4)
GLUCOSE SERPL-MCNC: 82 MG/DL (ref 70–99)
HCT VFR BLD AUTO: 38.2 % (ref 34–46.6)
HGB BLD-MCNC: 13 G/DL (ref 11.1–15.9)
IMM GRANULOCYTES # BLD AUTO: 0 X10E3/UL (ref 0–0.1)
IMM GRANULOCYTES NFR BLD AUTO: 1 %
INR PPP: 1.1 (ref 0.9–1.2)
LYMPHOCYTES # BLD AUTO: 0.9 X10E3/UL (ref 0.7–3.1)
LYMPHOCYTES NFR BLD AUTO: 20 %
MCH RBC QN AUTO: 31.9 PG (ref 26.6–33)
MCHC RBC AUTO-ENTMCNC: 34 G/DL (ref 31.5–35.7)
MCV RBC AUTO: 94 FL (ref 79–97)
MONOCYTES # BLD AUTO: 0.4 X10E3/UL (ref 0.1–0.9)
MONOCYTES NFR BLD AUTO: 9 %
MORPHOLOGY BLD-IMP: NORMAL
NEUTROPHILS # BLD AUTO: 3.1 X10E3/UL (ref 1.4–7)
NEUTROPHILS NFR BLD AUTO: 70 %
PLATELET # BLD AUTO: NORMAL X10E3/UL
POTASSIUM SERPL-SCNC: 3.5 MMOL/L (ref 3.5–5.2)
PROT SERPL-MCNC: 7.4 G/DL (ref 6–8.5)
PROTHROMBIN TIME: 11.4 SEC (ref 9.1–12)
RBC # BLD AUTO: 4.07 X10E6/UL (ref 3.77–5.28)
SODIUM SERPL-SCNC: 139 MMOL/L (ref 134–144)
WBC # BLD AUTO: 4.4 X10E3/UL (ref 3.4–10.8)

## 2024-04-16 ENCOUNTER — HOSPITAL ENCOUNTER (OUTPATIENT)
Facility: HOSPITAL | Age: 74
Discharge: HOME | End: 2024-04-19
Payer: MEDICARE

## 2024-04-16 VITALS — WEIGHT: 124 LBS | BODY MASS INDEX: 20.63 KG/M2

## 2024-04-16 DIAGNOSIS — K86.9 PANCREATIC LESION: ICD-10-CM

## 2024-04-16 DIAGNOSIS — R74.8 ELEVATED ALKALINE PHOSPHATASE LEVEL: ICD-10-CM

## 2024-04-16 PROCEDURE — 74183 MRI ABD W/O CNTR FLWD CNTR: CPT

## 2024-04-16 PROCEDURE — 6360000004 HC RX CONTRAST MEDICATION: Performed by: RADIOLOGY

## 2024-04-16 PROCEDURE — A9579 GAD-BASE MR CONTRAST NOS,1ML: HCPCS | Performed by: RADIOLOGY

## 2024-04-16 RX ADMIN — GADOTERIDOL 11 ML: 279.3 INJECTION, SOLUTION INTRAVENOUS at 14:53

## 2024-07-12 RX ORDER — FUROSEMIDE 40 MG/1
TABLET ORAL
Qty: 90 TABLET | Refills: 3 | Status: SHIPPED | OUTPATIENT
Start: 2024-07-12

## 2024-08-06 ENCOUNTER — PREP FOR PROCEDURE (OUTPATIENT)
Age: 74
End: 2024-08-06

## 2024-08-06 PROBLEM — R18.8 ASCITIC FLUID: Status: ACTIVE | Noted: 2024-08-06

## 2024-08-06 PROBLEM — K74.60 CIRRHOSIS OF LIVER (HCC): Status: ACTIVE | Noted: 2024-08-06

## 2024-10-10 ENCOUNTER — OFFICE VISIT (OUTPATIENT)
Age: 74
End: 2024-10-10
Payer: MEDICARE

## 2024-10-10 VITALS
WEIGHT: 132.6 LBS | SYSTOLIC BLOOD PRESSURE: 85 MMHG | HEART RATE: 61 BPM | BODY MASS INDEX: 22.09 KG/M2 | OXYGEN SATURATION: 100 % | DIASTOLIC BLOOD PRESSURE: 52 MMHG | HEIGHT: 65 IN | TEMPERATURE: 98 F

## 2024-10-10 DIAGNOSIS — R18.8 CIRRHOSIS OF LIVER WITH ASCITES, UNSPECIFIED HEPATIC CIRRHOSIS TYPE (HCC): Primary | ICD-10-CM

## 2024-10-10 DIAGNOSIS — K75.4 AUTOIMMUNE HEPATITIS (HCC): ICD-10-CM

## 2024-10-10 DIAGNOSIS — K74.60 CIRRHOSIS OF LIVER WITH ASCITES, UNSPECIFIED HEPATIC CIRRHOSIS TYPE (HCC): Primary | ICD-10-CM

## 2024-10-10 PROCEDURE — G8427 DOCREV CUR MEDS BY ELIG CLIN: HCPCS | Performed by: NURSE PRACTITIONER

## 2024-10-10 PROCEDURE — G8484 FLU IMMUNIZE NO ADMIN: HCPCS | Performed by: NURSE PRACTITIONER

## 2024-10-10 PROCEDURE — G8400 PT W/DXA NO RESULTS DOC: HCPCS | Performed by: NURSE PRACTITIONER

## 2024-10-10 PROCEDURE — 1090F PRES/ABSN URINE INCON ASSESS: CPT | Performed by: NURSE PRACTITIONER

## 2024-10-10 PROCEDURE — 1036F TOBACCO NON-USER: CPT | Performed by: NURSE PRACTITIONER

## 2024-10-10 PROCEDURE — 1123F ACP DISCUSS/DSCN MKR DOCD: CPT | Performed by: NURSE PRACTITIONER

## 2024-10-10 PROCEDURE — G8420 CALC BMI NORM PARAMETERS: HCPCS | Performed by: NURSE PRACTITIONER

## 2024-10-10 PROCEDURE — 99214 OFFICE O/P EST MOD 30 MIN: CPT | Performed by: NURSE PRACTITIONER

## 2024-10-10 RX ORDER — ASPIRIN 81 MG/1
81 TABLET, CHEWABLE ORAL DAILY
COMMUNITY
Start: 2024-05-29 | End: 2025-05-29

## 2024-10-10 ASSESSMENT — ANXIETY QUESTIONNAIRES
7. FEELING AFRAID AS IF SOMETHING AWFUL MIGHT HAPPEN: NOT AT ALL
5. BEING SO RESTLESS THAT IT IS HARD TO SIT STILL: NOT AT ALL
GAD7 TOTAL SCORE: 0
IF YOU CHECKED OFF ANY PROBLEMS ON THIS QUESTIONNAIRE, HOW DIFFICULT HAVE THESE PROBLEMS MADE IT FOR YOU TO DO YOUR WORK, TAKE CARE OF THINGS AT HOME, OR GET ALONG WITH OTHER PEOPLE: NOT DIFFICULT AT ALL
1. FEELING NERVOUS, ANXIOUS, OR ON EDGE: NOT AT ALL
2. NOT BEING ABLE TO STOP OR CONTROL WORRYING: NOT AT ALL
4. TROUBLE RELAXING: NOT AT ALL
6. BECOMING EASILY ANNOYED OR IRRITABLE: NOT AT ALL
3. WORRYING TOO MUCH ABOUT DIFFERENT THINGS: NOT AT ALL

## 2024-10-10 NOTE — PROGRESS NOTES
[facility]      Pb Apple MD, FACP, FACG, FAASLD      MEENU Rodriguez, [facility]-BC   Nan Pacheco, [facility]   [place] Rodas, FNP-C  Pedro Barrett, FNP-C   Toshia Raphael, [facility]   at [facility]   [address]   [place]  [address] [phone]   FAX: [phone]  [facility]   [address]   [place]  [address] [phone]   FAX: [phone]       Patient Care Team:  Melissa Diehl MD as PCP - General          Flora Serrano is being seen at [facility] for 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 The active problem list, all pertinent past medical history, medications, liver histology, endoscopic studies, radiologic findings and laboratory findings related to the liver disorder were reviewed and discussed with the patient.      The patient is a 74 y.o.  female who was found to have abnormalities in liver transaminases and alkaline phosphatase in 1989.     AIH has been in biochemical remission for 20+ years on low dose prednisone utilized to treat ITP     She was found to have cirrhosis in about 2020.    No new hepatic hydrothorax since 5/2023.  She had an EGD in 8/2023 4 bands placed.  Next EGD is scheduled for 11/16/2023    She states she feels great.    Since the last office appointment:  She will take a full pill of spironolactone if her weight creeps up. But is only taking 50 mg daily  Next EGD is scheduled for 11/21/2024 with [facility]  She feels that her abdomen is a little larger, she has gained about 8 lbs    In the office today the patient has the following symptoms: none  Feeling well, she is asking to space out her appointments.    The patient is not experiencing the following symptoms which are

## 2024-10-10 NOTE — PROGRESS NOTES
Chief Complaint   Patient presents with    Follow-up     N/A       Vitals:    10/10/24 1236   BP: (!) 85/52   Site: Right Upper Arm   Position: Sitting   Pulse: 61   Temp: 98 °F (36.7 °C)   TempSrc: Temporal   SpO2: 100%   Weight: 60.1 kg (132 lb 9.6 oz)   Height: 1.651 m (5' 5\")     .  \"Have you been to the ER, urgent care clinic since your last visit?  Hospitalized since your last visit?\"    YES - When: approximately 5/28/24 ago.  Where and Why: [facility] .    “Have you seen or consulted any other health care providers outside of [facility] since your last visit?”    NO            Click Here for Release of Records Request

## 2024-10-11 LAB
AFP L3 MFR SERPL: NORMAL % (ref 0–9.9)
AFP SERPL-MCNC: 2.6 NG/ML (ref 0–9.2)
ALBUMIN SERPL-MCNC: 4 G/DL (ref 3.8–4.8)
ALP SERPL-CCNC: 128 IU/L (ref 44–121)
ALT SERPL-CCNC: 37 IU/L (ref 0–32)
AST SERPL-CCNC: 53 IU/L (ref 0–40)
BASOPHILS # BLD AUTO: 0 X10E3/UL (ref 0–0.2)
BASOPHILS NFR BLD AUTO: 0 %
BILIRUB DIRECT SERPL-MCNC: 0.29 MG/DL (ref 0–0.4)
BILIRUB SERPL-MCNC: 0.9 MG/DL (ref 0–1.2)
BUN SERPL-MCNC: 19 MG/DL (ref 8–27)
BUN/CREAT SERPL: 22 (ref 12–28)
CALCIUM SERPL-MCNC: 9.4 MG/DL (ref 8.7–10.3)
CHLORIDE SERPL-SCNC: 101 MMOL/L (ref 96–106)
CO2 SERPL-SCNC: 24 MMOL/L (ref 20–29)
CREAT SERPL-MCNC: 0.88 MG/DL (ref 0.57–1)
EGFRCR SERPLBLD CKD-EPI 2021: 68 ML/MIN/1.73
EOSINOPHIL # BLD AUTO: 0 X10E3/UL (ref 0–0.4)
EOSINOPHIL NFR BLD AUTO: 0 %
ERYTHROCYTE [DISTWIDTH] IN BLOOD BY AUTOMATED COUNT: 13.8 % (ref 11.7–15.4)
GLUCOSE SERPL-MCNC: 72 MG/DL (ref 70–99)
HCT VFR BLD AUTO: 42.2 % (ref 34–46.6)
HGB BLD-MCNC: 14.2 G/DL (ref 11.1–15.9)
IMM GRANULOCYTES # BLD AUTO: 0 X10E3/UL (ref 0–0.1)
IMM GRANULOCYTES NFR BLD AUTO: 0 %
INR PPP: 1.1 (ref 0.9–1.2)
LYMPHOCYTES # BLD AUTO: 1.1 X10E3/UL (ref 0.7–3.1)
LYMPHOCYTES NFR BLD AUTO: 22 %
MCH RBC QN AUTO: 31.7 PG (ref 26.6–33)
MCHC RBC AUTO-ENTMCNC: 33.6 G/DL (ref 31.5–35.7)
MCV RBC AUTO: 94 FL (ref 79–97)
MONOCYTES # BLD AUTO: 0.4 X10E3/UL (ref 0.1–0.9)
MONOCYTES NFR BLD AUTO: 7 %
MORPHOLOGY BLD-IMP: ABNORMAL
NEUTROPHILS # BLD AUTO: 3.5 X10E3/UL (ref 1.4–7)
NEUTROPHILS NFR BLD AUTO: 71 %
PLATELET # BLD AUTO: 47 X10E3/UL (ref 150–450)
POTASSIUM SERPL-SCNC: 4 MMOL/L (ref 3.5–5.2)
PROT SERPL-MCNC: 7.3 G/DL (ref 6–8.5)
PROTHROMBIN TIME: 12.6 SEC (ref 9.1–12)
RBC # BLD AUTO: 4.48 X10E6/UL (ref 3.77–5.28)
SODIUM SERPL-SCNC: 142 MMOL/L (ref 134–144)
WBC # BLD AUTO: 5 X10E3/UL (ref 3.4–10.8)

## 2024-11-21 ENCOUNTER — ANESTHESIA (OUTPATIENT)
Facility: HOSPITAL | Age: 74
End: 2024-11-21
Payer: MEDICARE

## 2024-11-21 ENCOUNTER — HOSPITAL ENCOUNTER (OUTPATIENT)
Facility: HOSPITAL | Age: 74
Setting detail: OUTPATIENT SURGERY
Discharge: HOME | End: 2024-11-21
Attending: INTERNAL MEDICINE | Admitting: INTERNAL MEDICINE
Payer: MEDICARE

## 2024-11-21 ENCOUNTER — ANESTHESIA EVENT (OUTPATIENT)
Facility: HOSPITAL | Age: 74
End: 2024-11-21
Payer: MEDICARE

## 2024-11-21 VITALS
SYSTOLIC BLOOD PRESSURE: 104 MMHG | HEIGHT: 65 IN | WEIGHT: 134.3 LBS | TEMPERATURE: 97.9 F | DIASTOLIC BLOOD PRESSURE: 66 MMHG | OXYGEN SATURATION: 99 % | BODY MASS INDEX: 22.38 KG/M2 | HEART RATE: 52 BPM | RESPIRATION RATE: 18 BRPM

## 2024-11-21 PROCEDURE — 2709999900 HC NON-CHARGEABLE SUPPLY: Performed by: INTERNAL MEDICINE

## 2024-11-21 PROCEDURE — 88305 TISSUE EXAM BY PATHOLOGIST: CPT

## 2024-11-21 PROCEDURE — 3600007502: Performed by: INTERNAL MEDICINE

## 2024-11-21 PROCEDURE — 3700000000 HC ANESTHESIA ATTENDED CARE: Performed by: INTERNAL MEDICINE

## 2024-11-21 PROCEDURE — 3600007512: Performed by: INTERNAL MEDICINE

## 2024-11-21 PROCEDURE — 3700000001 HC ADD 15 MINUTES (ANESTHESIA): Performed by: INTERNAL MEDICINE

## 2024-11-21 PROCEDURE — 2720000010 HC SURG SUPPLY STERILE: Performed by: INTERNAL MEDICINE

## 2024-11-21 PROCEDURE — 2500000003 HC RX 250 WO HCPCS: Performed by: NURSE ANESTHETIST, CERTIFIED REGISTERED

## 2024-11-21 PROCEDURE — 2580000003 HC RX 258: Performed by: NURSE ANESTHETIST, CERTIFIED REGISTERED

## 2024-11-21 PROCEDURE — 6360000002 HC RX W HCPCS: Performed by: NURSE ANESTHETIST, CERTIFIED REGISTERED

## 2024-11-21 PROCEDURE — 7100000011 HC PHASE II RECOVERY - ADDTL 15 MIN: Performed by: INTERNAL MEDICINE

## 2024-11-21 PROCEDURE — 7100000010 HC PHASE II RECOVERY - FIRST 15 MIN: Performed by: INTERNAL MEDICINE

## 2024-11-21 PROCEDURE — 43251 EGD REMOVE LESION SNARE: CPT | Performed by: INTERNAL MEDICINE

## 2024-11-21 RX ORDER — SODIUM CHLORIDE 0.9 % (FLUSH) 0.9 %
5-40 SYRINGE (ML) INJECTION EVERY 12 HOURS SCHEDULED
Status: DISCONTINUED | OUTPATIENT
Start: 2024-11-21 | End: 2024-11-21 | Stop reason: HOSPADM

## 2024-11-21 RX ORDER — SODIUM CHLORIDE 9 MG/ML
INJECTION, SOLUTION INTRAVENOUS PRN
Status: DISCONTINUED | OUTPATIENT
Start: 2024-11-21 | End: 2024-11-21 | Stop reason: HOSPADM

## 2024-11-21 RX ORDER — 0.9 % SODIUM CHLORIDE 0.9 %
INTRAVENOUS SOLUTION INTRAVENOUS
Status: DISCONTINUED | OUTPATIENT
Start: 2024-11-21 | End: 2024-11-21 | Stop reason: SDUPTHER

## 2024-11-21 RX ORDER — SODIUM CHLORIDE 0.9 % (FLUSH) 0.9 %
5-40 SYRINGE (ML) INJECTION PRN
Status: DISCONTINUED | OUTPATIENT
Start: 2024-11-21 | End: 2024-11-21 | Stop reason: HOSPADM

## 2024-11-21 RX ORDER — LIDOCAINE HYDROCHLORIDE 20 MG/ML
INJECTION, SOLUTION EPIDURAL; INFILTRATION; INTRACAUDAL; PERINEURAL
Status: DISCONTINUED | OUTPATIENT
Start: 2024-11-21 | End: 2024-11-21 | Stop reason: SDUPTHER

## 2024-11-21 RX ADMIN — PROPOFOL 40 MG: 10 INJECTION, EMULSION INTRAVENOUS at 14:23

## 2024-11-21 RX ADMIN — PROPOFOL 140 MG: 10 INJECTION, EMULSION INTRAVENOUS at 14:16

## 2024-11-21 RX ADMIN — PROPOFOL 60 MG: 10 INJECTION, EMULSION INTRAVENOUS at 14:18

## 2024-11-21 RX ADMIN — PROPOFOL 50 MG: 10 INJECTION, EMULSION INTRAVENOUS at 14:20

## 2024-11-21 RX ADMIN — SODIUM CHLORIDE 30 ML: 9 INJECTION, SOLUTION INTRAVENOUS at 14:30

## 2024-11-21 RX ADMIN — LIDOCAINE HYDROCHLORIDE 25 MG: 20 INJECTION, SOLUTION EPIDURAL; INFILTRATION; INTRACAUDAL; PERINEURAL at 14:16

## 2024-11-21 ASSESSMENT — PAIN - FUNCTIONAL ASSESSMENT: PAIN_FUNCTIONAL_ASSESSMENT: NONE - DENIES PAIN

## 2024-11-21 NOTE — PROGRESS NOTES
Initial RN admission and assessment performed and documented in Endoscopy navigator.     Patient evaluated by anesthesia in pre-procedure holding.     All procedural vital signs, airway assessment, and level of consciousness information monitored and recorded by anesthesia staff on the anesthesia record.     Report received from CRNA post procedure.  Patient transported to recovery area by RN.    Endoscopy post procedure time out was performed and specimens were verified with physician.    Endoscope was pre-cleaned at bedside immediately following procedure by [employer]illard tech..

## 2024-11-21 NOTE — DISCHARGE INSTRUCTIONS
[facility]     Pb Apple MD, FACP, [facility], FAASLD   MD Shilpa Levy PA-C April S Ashworth, [facility]   Nan Pacheco, [facility]   [place] Gambleosta, [facility]  Pedro Barrett, [facility]   Toshia Raphael, [facility]   Birdie Laughlin, [facility]   [address], [address]   [place]  [address] [phone]   FAX: [phone]  [facility]   [address]   [place]  [address]   [phone]   FAX: [phone]         ENDOSCOPY DISCHARGE INSTRUCTIONS      Flora Serrano  1950  Date: 11/21/2024    DISCOMFORT:  Use lozenges or warm salt water gargle for sore thoat  Apply warm compress to IV site if red.  If redness or soreness persists call the office.  You may experience gas and bloating.  Walking and belching will help relieve this.  You may experience chest discomfort or pressure especially if banding of esophageal varices was performed.    DIET:  You may resume your normal diet.    ACTIVITY:  Spend the remainder of the day resting.  Avoid any strenuous activity.  You may not operate a vehicle for 12 hours.  You may not engage in an occupation involving machinery or appliances for rest of today.   Avoid making any critical or financial decisions for at least 24 hour.    Call the [facility] Office if you have any of the following:  Increasing chest or abdominal pain, nausea, vomiting, vomiting blood, abdominal distension or swelling, fever or chills, bloody discharge from nose or mouth or shortness of breath.    Follow-up Instructions:  Call Dr. Apple for any questions or problems at the phone number listed above.  If a biopsy was performed, you will be contacted by the office staff or Dr Apple within 1 week.   If you have not heard from us

## 2024-11-21 NOTE — H&P
[facility]     Pb Apple MD, FACP, MACG, FAASLD   MD Shilpa Levy, MEENU Caro, [facility]   Nan Pacheco, [facility]   [place] Clark, [facility]  Pedro Barrett, [facility]   Toshia Raphael, [facility]   Birdie Shahon, [facility]   [address]   [place]  [address] [phone]   FAX: [phone]  [facility]   [address]   [place]  [address] [phone]   FAX: [phone]         PRE-PROCEDURE NOTE - EGD    H and P from last office visit reviewed.    Allergies reviewed.  Out-patient medicaton list reviewed.    Patient Active Problem List   Diagnosis    Chronic ITP (idiopathic thrombocytopenia) (HCC)    Cirrhosis (HCC)    Herpes    Portal hypertension (HCC)    History of esophageal varices with bleeding    Autoimmune hepatitis (HCC)    Thrombocytopenia (HCC)    Anticoagulation management encounter    Chronic atrial fibrillation, unspecified    Typical atrial flutter    Pleural effusion associated with hepatic disorder    Cirrhosis of liver with ascites (HCC)    Ascites    Cirrhosis of liver (HCC)    Ascitic fluid         Allergies   Allergen Reactions    Mushroom Extract Complex Hives and Rash       No current facility-administered medications on file prior to encounter.     Current Outpatient Medications on File Prior to Encounter   Medication Sig Dispense Refill    furosemide (LASIX) 40 MG tablet TAKE 1 TABLET DAILY FOR    ACCUMULATION OF FLUID      CAUSED BY CIRRHOSIS OF THE LIVER 90 tablet 3    spironolactone (ALDACTONE) 100 MG tablet TAKE 1/2 TABLET DAILY 45 tablet 3    metoprolol tartrate (LOPRESSOR) 25 MG tablet Take 0.5 tablets twice a day by oral route.      Multiple Vitamins-Minerals (THERAPEUTIC MULTIVITAMIN-MINERALS)

## 2024-11-21 NOTE — ANESTHESIA PRE PROCEDURE
drained from lung x 3   • UPPER GASTROINTESTINAL ENDOSCOPY N/A 08/18/2023    EGD performed by Pb Apple MD at [facility] ENDOSCOPY   • UPPER GASTROINTESTINAL ENDOSCOPY N/A 08/18/2023    EGD BAND LIGATION performed by Pb Apple MD at [facility] ENDOSCOPY   • UPPER GASTROINTESTINAL ENDOSCOPY N/A 08/18/2023    EGD BIOPSY performed by Pb Apple MD at [facility] ENDOSCOPY   • UPPER GASTROINTESTINAL ENDOSCOPY N/A 08/18/2023    EGD CONTROL HEMORRHAGE performed by Pb Apple MD at [facility] ENDOSCOPY   • UPPER GASTROINTESTINAL ENDOSCOPY N/A 11/16/2023    EGD ESOPHAGOGASTRODUODENOSCOPY performed by Pb Apple MD at [facility] ENDOSCOPY   • UPPER GASTROINTESTINAL ENDOSCOPY N/A 3/21/2024    ESOPHAGOGASTRODUODENOSCOPY DIAGNOSTIC ONLY performed by Pb Apple MD at [facility] ENDOSCOPY       Social History:    Social History     Tobacco Use   • Smoking status: Never   • Smokeless tobacco: Never   Substance Use Topics   • Alcohol use: No                                Counseling given: Not Answered      Vital Signs (Current): There were no vitals filed for this visit.                                           BP Readings from Last 3 Encounters:   10/10/24 (!) 85/52   04/10/24 (!) 102/56   03/21/24 (!) 99/54       NPO Status:                                                                                 BMI:   Wt Readings from Last 3 Encounters:   10/10/24 60.1 kg (132 lb 9.6 oz)   04/16/24 56.2 kg (124 lb)   04/10/24 56.6 kg (124 lb 12.8 oz)     There is no height or weight on file to calculate BMI.    CBC:   Lab Results   Component Value Date/Time    WBC 5.0 10/10/2024 01:30 PM    RBC 4.48 10/10/2024 01:30 PM    HGB 14.2 10/10/2024 01:30 PM    HCT 42.2 10/10/2024 01:30 PM    MCV 94 10/10/2024 01:30 PM    RDW 13.8 10/10/2024 01:30 PM    PLT 47 10/10/2024 01:30 PM       CMP:   Lab Results   Component Value Date/Time     10/10/2024 01:30 PM    K 4.0 10/10/2024 01:30 PM     10/10/2024 01:30 PM

## 2024-11-21 NOTE — OP NOTE
[facility]     Pb Apple MD, FACP, MACG, FAASLD   MD Shilpa Levy, MEENU Caro, [facility]   Nan Pacheco, [facility]   [place] Rodas, [facility]  Pedro Barrett, [facility]   Toshia Raphael, [facility]   Birdie Laughlin, [facility]   at [facility]   [address]   [place]  [address] [phone]   FAX: [phone]  [facility]   [address]   [place]  [address] [phone]   FAX: [phone]         UPPER ENDOSCOPY PROCEDURE NOTE    NAME: Flora Serrano  :  1950  MRN:  [id number]    INDICATION: Cirrhosis.  Screening for esophageal varices with variceal ligation if  indicated.    : bP Apple MD    SURGICAL ASSISTANT:  None    PROSTHETIC DEVISES, TISSUE GRAFTS, ORGAN TRANSPLANTS:  Not applicable     ANESTHESIA/SEDATION: MAC Sedation per anesthesiology         PROCEDURE DESCRIPTION:  [employer] consent was obtained from the patient for the procedure.  All risks and benefits of the procedure explained.     The procedure was performed in the endoscopy suite.  The patient was laying on a stretcher and moved to the left lateral decubitus position prior to administration of sedation.    Sedation was administered by anesthesiology.  See their note for details.      The endoscope was inserted into the mouth and advanced under direct vision to the second portion of the duodenum.  Careful inspection of upper gastrointestinal tract was made as the endoscope was inserted and withdrawn.  Retroflexion of the endoscope to view of the cardia of the stomach was performed.  The findings and interventions are described below.      FINDINGS:  Esophagus:    Normal.      Stomach:   No portal hypertensive gastropathy   Large cluster of polyps in the  antrum.    No gastric varices identified.    Duodenum:   Normal bulb and second portion    SPECIMENS COLLECTED:   Polyps removed by snare cautery.      INTERVENTIONS:  None    COMPLICATIONS: None.  The patient tolerated the procedure well.    EBL: Negligible.           RECOMMENDATIONS:  Observe until discharge parameters are achieved.  May resume previous diet.  Repeat endoscopy to reassess varices and need for banding in 1 year.  Follow-up [facility] office as scheduled.      Pb Apple MD  [address], [address]  [place]  [address] [phone]  [facility]

## 2024-11-21 NOTE — ANESTHESIA POSTPROCEDURE EVALUATION
Department of Anesthesiology  Postprocedure Note    Patient: Flora Serrano  MRN: [id number]  YOB: 1950  Date of evaluation: 11/21/2024    Procedure Summary       Date: 11/21/24 Room / Location: [facility] ENDO 03 / [facility] ENDOSCOPY    Anesthesia Start: 1414 Anesthesia Stop: 1432    Procedure: EGD DIAGNOSTIC ONLY (Upper GI Region) Diagnosis:       Cirrhosis of liver (HCC)      Ascitic fluid      (Cirrhosis of liver (HCC) [K74.60])      (Ascitic fluid [R18.8])    Surgeons: Pb Apple MD Responsible Provider: Anh Marin DO    Anesthesia Type: MAC ASA Status: 3            Anesthesia Type: MAC    Agustín Phase I: Agustín Score: 10    Agustín Phase II: Agustín Score: 9    Anesthesia Post Evaluation    Patient location during evaluation: PACU  Patient participation: complete - patient participated  Level of consciousness: awake and alert  Pain score: 0  Airway patency: patent  Nausea & Vomiting: no nausea and no vomiting  Cardiovascular status: blood pressure returned to baseline and hemodynamically stable  Respiratory status: acceptable and room air  Hydration status: euvolemic  Multimodal analgesia pain management approach  Pain management: adequate and satisfactory to patient    No notable events documented.

## 2025-01-13 RX ORDER — SPIRONOLACTONE 100 MG/1
TABLET, FILM COATED ORAL
Qty: 90 TABLET | Refills: 3 | Status: SHIPPED | OUTPATIENT
Start: 2025-01-13

## 2025-03-12 ENCOUNTER — HOSPITAL ENCOUNTER (OUTPATIENT)
Facility: HOSPITAL | Age: 75
Discharge: HOME | End: 2025-03-15
Payer: MEDICARE

## 2025-03-12 DIAGNOSIS — R18.8 CIRRHOSIS OF LIVER WITH ASCITES, UNSPECIFIED HEPATIC CIRRHOSIS TYPE (HCC): ICD-10-CM

## 2025-03-12 DIAGNOSIS — K74.60 CIRRHOSIS OF LIVER WITH ASCITES, UNSPECIFIED HEPATIC CIRRHOSIS TYPE (HCC): ICD-10-CM

## 2025-03-12 PROCEDURE — 76700 US EXAM ABDOM COMPLETE: CPT

## 2025-03-19 ENCOUNTER — RESULTS FOLLOW-UP (OUTPATIENT)
Facility: HOSPITAL | Age: 75
End: 2025-03-19

## 2025-03-19 ENCOUNTER — CLINICAL DOCUMENTATION (OUTPATIENT)
Age: 75
End: 2025-03-19

## 2025-03-19 DIAGNOSIS — R18.8 CIRRHOSIS OF LIVER WITH ASCITES, UNSPECIFIED HEPATIC CIRRHOSIS TYPE (HCC): ICD-10-CM

## 2025-03-19 DIAGNOSIS — K74.60 CIRRHOSIS OF LIVER WITH ASCITES, UNSPECIFIED HEPATIC CIRRHOSIS TYPE (HCC): ICD-10-CM

## 2025-03-19 DIAGNOSIS — K86.9 PANCREATIC LESION: Primary | ICD-10-CM

## 2025-05-07 ENCOUNTER — HOSPITAL ENCOUNTER (OUTPATIENT)
Facility: HOSPITAL | Age: 75
Discharge: HOME | End: 2025-05-10
Payer: MEDICARE

## 2025-05-07 DIAGNOSIS — R18.8 CIRRHOSIS OF LIVER WITH ASCITES, UNSPECIFIED HEPATIC CIRRHOSIS TYPE (HCC): ICD-10-CM

## 2025-05-07 DIAGNOSIS — K74.60 CIRRHOSIS OF LIVER WITH ASCITES, UNSPECIFIED HEPATIC CIRRHOSIS TYPE (HCC): ICD-10-CM

## 2025-05-07 DIAGNOSIS — K86.9 PANCREATIC LESION: ICD-10-CM

## 2025-05-07 PROCEDURE — 74183 MRI ABD W/O CNTR FLWD CNTR: CPT

## 2025-05-07 PROCEDURE — 6360000004 HC RX CONTRAST MEDICATION: Performed by: NURSE PRACTITIONER

## 2025-05-07 PROCEDURE — A9575 INJ GADOTERATE MEGLUMI 0.1ML: HCPCS | Performed by: NURSE PRACTITIONER

## 2025-05-07 RX ORDER — GADOTERATE MEGLUMINE 376.9 MG/ML
12 INJECTION INTRAVENOUS ONCE
Status: COMPLETED | OUTPATIENT
Start: 2025-05-07 | End: 2025-05-07

## 2025-05-07 RX ADMIN — GADOTERATE MEGLUMINE 12 ML: 376.9 INJECTION INTRAVENOUS at 09:05

## 2025-05-15 ENCOUNTER — RESULTS FOLLOW-UP (OUTPATIENT)
Age: 75
End: 2025-05-15

## 2025-05-16 ENCOUNTER — OFFICE VISIT (OUTPATIENT)
Age: 75
End: 2025-05-16
Payer: MEDICARE

## 2025-05-16 VITALS
SYSTOLIC BLOOD PRESSURE: 91 MMHG | OXYGEN SATURATION: 99 % | TEMPERATURE: 98.6 F | WEIGHT: 137 LBS | HEIGHT: 65 IN | BODY MASS INDEX: 22.82 KG/M2 | DIASTOLIC BLOOD PRESSURE: 50 MMHG | HEART RATE: 71 BPM

## 2025-05-16 DIAGNOSIS — K86.89 PANCREATIC MASS: Primary | ICD-10-CM

## 2025-05-16 DIAGNOSIS — R97.8 OTHER ABNORMAL TUMOR MARKERS: ICD-10-CM

## 2025-05-16 DIAGNOSIS — K74.69 OTHER CIRRHOSIS OF LIVER (HCC): ICD-10-CM

## 2025-05-16 LAB
ALBUMIN SERPL-MCNC: 3.4 G/DL (ref 3.5–5)
ALBUMIN/GLOB SERPL: 0.9 (ref 1.1–2.2)
ALP SERPL-CCNC: 117 U/L (ref 45–117)
ALT SERPL-CCNC: 46 U/L (ref 12–78)
ANION GAP SERPL CALC-SCNC: 5 MMOL/L (ref 2–12)
AST SERPL-CCNC: 53 U/L (ref 15–37)
BASOPHILS # BLD: 0.01 K/UL (ref 0–0.1)
BASOPHILS NFR BLD: 0.2 % (ref 0–1)
BILIRUB DIRECT SERPL-MCNC: 0.5 MG/DL (ref 0–0.2)
BILIRUB SERPL-MCNC: 1.2 MG/DL (ref 0.2–1)
BUN SERPL-MCNC: 17 MG/DL (ref 6–20)
BUN/CREAT SERPL: 19 (ref 12–20)
CALCIUM SERPL-MCNC: 9.5 MG/DL (ref 8.5–10.1)
CHLORIDE SERPL-SCNC: 105 MMOL/L (ref 97–108)
CO2 SERPL-SCNC: 28 MMOL/L (ref 21–32)
CREAT SERPL-MCNC: 0.9 MG/DL (ref 0.55–1.02)
DIFFERENTIAL METHOD BLD: ABNORMAL
EOSINOPHIL # BLD: 0 K/UL (ref 0–0.4)
EOSINOPHIL NFR BLD: 0 % (ref 0–7)
ERYTHROCYTE [DISTWIDTH] IN BLOOD BY AUTOMATED COUNT: 14.6 % (ref 11.5–14.5)
GLOBULIN SER CALC-MCNC: 3.6 G/DL (ref 2–4)
GLUCOSE SERPL-MCNC: 143 MG/DL (ref 65–100)
HCT VFR BLD AUTO: 39.9 % (ref 35–47)
HGB BLD-MCNC: 13.5 G/DL (ref 11.5–16)
IMM GRANULOCYTES # BLD AUTO: 0.03 K/UL (ref 0–0.04)
IMM GRANULOCYTES NFR BLD AUTO: 0.5 % (ref 0–0.5)
INR PPP: 1.2 (ref 0.9–1.1)
LYMPHOCYTES # BLD: 1 K/UL (ref 0.8–3.5)
LYMPHOCYTES NFR BLD: 18.1 % (ref 12–49)
MCH RBC QN AUTO: 32.3 PG (ref 26–34)
MCHC RBC AUTO-ENTMCNC: 33.8 G/DL (ref 30–36.5)
MCV RBC AUTO: 95.5 FL (ref 80–99)
MONOCYTES # BLD: 0.39 K/UL (ref 0–1)
MONOCYTES NFR BLD: 7.1 % (ref 5–13)
NEUTS SEG # BLD: 4.08 K/UL (ref 1.8–8)
NEUTS SEG NFR BLD: 74.1 % (ref 32–75)
NRBC # BLD: 0 K/UL (ref 0–0.01)
NRBC BLD-RTO: 0 PER 100 WBC
PLATELET # BLD AUTO: 51 K/UL (ref 150–400)
POTASSIUM SERPL-SCNC: 3.5 MMOL/L (ref 3.5–5.1)
PROT SERPL-MCNC: 7 G/DL (ref 6.4–8.2)
PROTHROMBIN TIME: 12.4 SEC (ref 9.2–11.2)
RBC # BLD AUTO: 4.18 M/UL (ref 3.8–5.2)
RBC MORPH BLD: ABNORMAL
SODIUM SERPL-SCNC: 138 MMOL/L (ref 136–145)
WBC # BLD AUTO: 5.5 K/UL (ref 3.6–11)

## 2025-05-16 PROCEDURE — 99214 OFFICE O/P EST MOD 30 MIN: CPT | Performed by: NURSE PRACTITIONER

## 2025-05-16 ASSESSMENT — PATIENT HEALTH QUESTIONNAIRE - PHQ9
SUM OF ALL RESPONSES TO PHQ QUESTIONS 1-9: 0
2. FEELING DOWN, DEPRESSED OR HOPELESS: NOT AT ALL
SUM OF ALL RESPONSES TO PHQ QUESTIONS 1-9: 0
SUM OF ALL RESPONSES TO PHQ QUESTIONS 1-9: 0
1. LITTLE INTEREST OR PLEASURE IN DOING THINGS: NOT AT ALL
SUM OF ALL RESPONSES TO PHQ QUESTIONS 1-9: 0
DEPRESSION UNABLE TO ASSESS: FUNCTIONAL CAPACITY MOTIVATION LIMITS ACCURACY

## 2025-05-16 NOTE — PROGRESS NOTES
[facility]     Pb Apple MD, FACP, MACG, FAASLD   MD Shilpa Levy PA-C April S Ashworth, [facility]   Nan Guallpaantony, [facility]  Pedro Barrett, FNP-C   Toshia Raphael, [facility]         [facility]   [address]   [place]  [address] [phone]   FAX: [phone]  [facility]   [address]   [place]  [address]   [phone]   FAX: [phone]       Patient Care Team:  Melissa Diehl MD as PCP - General    Patient Active Problem List    Diagnosis Date Noted    Cirrhosis of liver (HCC) 08/06/2024    Ascitic fluid 08/06/2024    Cirrhosis of liver with ascites (HCC) 02/14/2024    Ascites 02/14/2024    Pleural effusion associated with hepatic disorder 06/20/2023    Chronic atrial fibrillation, unspecified 05/25/2023    Typical atrial flutter 05/25/2023    Thrombocytopenia 05/06/2023    Anticoagulation management encounter 05/06/2023    Autoimmune hepatitis (HCC) 02/01/2023    Chronic ITP (idiopathic thrombocytopenia) (HCC) 01/31/2023    Cirrhosis (HCC) 01/31/2023    Herpes 01/31/2023    Portal hypertension (HCC) 01/31/2023    History of esophageal varices with bleeding 01/31/2023       Flora Serrano is being seen at [facility] for 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 The active problem list, all pertinent past medical history, medications, liver histology, endoscopic studies, radiologic findings and laboratory findings related to the liver disorder were reviewed and discussed with the patient.      The patient is a 75 y.o. female who was found to have abnormalities in liver transaminases and alkaline phosphatase in 1989.     AIH has been in biochemical remission for 20+ years on low dose prednisone utilized

## 2025-05-16 NOTE — PROGRESS NOTES
Chief Complaint   Patient presents with    Follow-up     Vitals:    05/16/25 1449   BP: (!) 91/50   Pulse: 71   Temp: 98.6 °F (37 °C)   SpO2: 99%     Have you been to the ER, urgent care clinic since your last visit?  Hospitalized since your last visit?   NO    Have you seen or consulted any other health care providers outside our system since your last visit?   NO

## 2025-05-18 LAB — CANCER AG19-9 SERPL-ACNC: 32 U/ML (ref 0–35)

## 2025-05-19 ENCOUNTER — CLINICAL DOCUMENTATION (OUTPATIENT)
Age: 75
End: 2025-05-19

## 2025-05-19 NOTE — PROGRESS NOTES
On 5/19/25 a external referral was fax to Gastroenterology with the last office note and labs by sandeep (cleve)

## 2025-05-22 ENCOUNTER — RESULTS FOLLOW-UP (OUTPATIENT)
Age: 75
End: 2025-05-22

## 2025-05-29 LAB
AFP L3 MFR SERPL: NORMAL % (ref 0–9.9)
AFP SERPL-MCNC: 3 NG/ML (ref 0–9.2)

## 2025-06-23 RX ORDER — FUROSEMIDE 40 MG/1
TABLET ORAL
Qty: 90 TABLET | Refills: 3 | Status: SHIPPED | OUTPATIENT
Start: 2025-06-23

## (undated) DEVICE — Z DISCONTINUED NO SUB IDED SET EXTN W/ 4 W STPCOCK M SPIN LOK 36IN

## (undated) DEVICE — KIT IV STRT W CHLORAPREP PD 1ML

## (undated) DEVICE — SOLIDIFIER FLUID 3000 CC ABSORB

## (undated) DEVICE — AIRLIFE™ U/CONNECT-IT OXYGEN TUBING 7 FEET (2.1 M) CRUSH-RESISTANT OXYGEN TUBING, VINYL TIPPED: Brand: AIRLIFE™

## (undated) DEVICE — ELECTRODE PT RET AD L9FT HI MOIST COND ADH HYDRGEL CORDED

## (undated) DEVICE — CONNECTOR TBNG AUX H2O JET DISP FOR OLY 160/180 SER

## (undated) DEVICE — ORISE PROKNIFE 1.5 MM ELECTRODE: Brand: ORISE™ PROKNIFE

## (undated) DEVICE — KENDALL RADIOLUCENT FOAM MONITORING ELECTRODE -RECTANGULAR SHAPE: Brand: KENDALL

## (undated) DEVICE — TRAP SURG QUAD PARABOLA SLOT DSGN SFTY SCRN TRAPEASE

## (undated) DEVICE — Z DISCONTINUED USE 2751540 TUBING IRRIG L10IN DISP PMP ENDOGATOR

## (undated) DEVICE — SNARE ENDOSCP L240CM LOOP W27MM SHTH DIA2.4MM WRK CHN 2.8MM

## (undated) DEVICE — BAG BELONG PT PERS CLEAR HANDL

## (undated) DEVICE — SYRINGE MED 20ML STD CLR PLAS LUERLOCK TIP N CTRL DISP

## (undated) DEVICE — TUBING HYDR IRR --

## (undated) DEVICE — FORCEPS BX L240CM JAW DIA2.4MM ORNG L CAP W/ NDL DISP RAD

## (undated) DEVICE — NEEDLE HYPO 18GA L1.5IN PNK S STL HUB POLYPR SHLD REG BVL

## (undated) DEVICE — ENDO CARRY-ON PROCEDURE KIT INCLUDES ENZYMATIC SPONGE, GAUZE, BIOHAZARD LABEL, TRAY, LUBRICANT, DIRTY SCOPE LABEL, WATER LABEL, TRAY, DRAWSTRING PAD, AND DEFENDO 4-PIECE KIT.: Brand: ENDO CARRY-ON PROCEDURE KIT

## (undated) DEVICE — BW-412T DISP COMBO CLEANING BRUSH: Brand: SINGLE USE COMBINATION CLEANING BRUSH

## (undated) DEVICE — CATH IV AUTOGRD BC BLU 22GA 25 -- INSYTE

## (undated) DEVICE — TUBING IRRIG COMPATIBLE W ERBE MEDIVATOR PMP HYDR

## (undated) DEVICE — LIGATOR ENDOSCP L2.8MM DIA8.6-11.5MM MULT BND SPEEDBAND

## (undated) DEVICE — ORISE PROKNIFE 3.0 MM ELECTRODE: Brand: ORISE™ PROKNIFE

## (undated) DEVICE — CANN NASAL O2 CAPNOGRAPHY AD -- FILTERLINE

## (undated) DEVICE — SNARE ENDOSCP POLYP MED STD AD 2.4X27X240 CM 2.8 MM OVL SENS

## (undated) DEVICE — 1200 GUARD II KIT W/5MM TUBE W/O VAC TUBE: Brand: GUARDIAN

## (undated) DEVICE — QUILTED PREMIUM COMFORT UNDERPAD,EXTRA HEAVY: Brand: WINGS

## (undated) DEVICE — SET ADMIN 16ML TBNG L100IN 2 Y INJ SITE IV PIGGY BK DISP

## (undated) DEVICE — SET EXTN TBNG L BOR 4 W STPCOCK ST 32IN PRIMING VOL 6ML